# Patient Record
Sex: MALE | Race: WHITE | NOT HISPANIC OR LATINO | Employment: OTHER | URBAN - METROPOLITAN AREA
[De-identification: names, ages, dates, MRNs, and addresses within clinical notes are randomized per-mention and may not be internally consistent; named-entity substitution may affect disease eponyms.]

---

## 2017-01-12 ENCOUNTER — OFFICE VISIT (OUTPATIENT)
Dept: PODIATRY | Facility: CLINIC | Age: 67
End: 2017-01-12
Payer: MEDICARE

## 2017-01-12 VITALS — HEIGHT: 64 IN | WEIGHT: 126.56 LBS | BODY MASS INDEX: 21.61 KG/M2

## 2017-01-12 DIAGNOSIS — M21.6X9 ACQUIRED EQUINUS DEFORMITY OF FOOT, UNSPECIFIED LATERALITY: ICD-10-CM

## 2017-01-12 DIAGNOSIS — L84 CORN OR CALLUS: ICD-10-CM

## 2017-01-12 DIAGNOSIS — E11.42 DIABETIC POLYNEUROPATHY ASSOCIATED WITH TYPE 2 DIABETES MELLITUS: Primary | ICD-10-CM

## 2017-01-12 PROCEDURE — 2022F DILAT RTA XM EVC RTNOPTHY: CPT | Mod: S$GLB,,, | Performed by: PODIATRIST

## 2017-01-12 PROCEDURE — 1159F MED LIST DOCD IN RCRD: CPT | Mod: S$GLB,,, | Performed by: PODIATRIST

## 2017-01-12 PROCEDURE — 99203 OFFICE O/P NEW LOW 30 MIN: CPT | Mod: 25,S$GLB,, | Performed by: PODIATRIST

## 2017-01-12 PROCEDURE — 3060F POS MICROALBUMINURIA REV: CPT | Mod: S$GLB,,, | Performed by: PODIATRIST

## 2017-01-12 PROCEDURE — 99999 PR PBB SHADOW E&M-EST. PATIENT-LVL II: CPT | Mod: PBBFAC,,, | Performed by: PODIATRIST

## 2017-01-12 PROCEDURE — 3044F HG A1C LEVEL LT 7.0%: CPT | Mod: S$GLB,,, | Performed by: PODIATRIST

## 2017-01-12 RX ORDER — UREA 40 %
CREAM (GRAM) TOPICAL DAILY
Qty: 185 G | Refills: 11 | Status: SHIPPED | OUTPATIENT
Start: 2017-01-12 | End: 2017-03-02

## 2017-01-12 NOTE — LETTER
January 12, 2017      Deandre Altamirano MD  [address]  [address]  [facility] [address]           [place] [facility]  [address]  [facility] [address]  Phone: [phone]          Patient: Sukumar Tay   MR Number: [id number]   YOB: 1950   Date of Visit: 1/12/2017       Dear Dr. Deandre Altamirano:    Thank you for referring Sukumar Tay to me for evaluation. Attached you will find relevant portions of my assessment and plan of care.    If you have questions, please do not hesitate to call me. I look forward to following Sukumar Tay along with you.    Sincerely,    Niranjan Weaver, JON    Enclosure  CC:  No Recipients    If you would like to receive this communication electronically, please contact [email or web address] or [phone] to request more information on [employer] access.    For providers and/or their staff who would like to refer a patient to [facility], please contact us through our one-stop-shop provider referral line, [facility], at [phone].    If you feel you have received this communication in error or would no longer like to receive these types of communications, please e-mail [email or web address]

## 2017-01-12 NOTE — MR AVS SNAPSHOT
[place] [facility]  [address] [place] [address]  Phone: [phone]                  Sukumar Tay   2017 10:45 AM   Office Visit    Description:  Male : 1950   Provider:  Niranjan Weaver DPM   Department:  [place] [facility]           Reason for Visit     Diabetic Foot Exam     Plantar Fasciitis           Diagnoses this Visit        Comments    Diabetic polyneuropathy associated with type 2 diabetes mellitus    -  Primary     Corn or callus         Acquired equinus deformity of foot, unspecified laterality                To Do List           Future Appointments        Provider Department Dept Phone    3/2/2017 9:30 AM Mercedes Beatty MD [place] [facility] [phone]    2017 1:20 PM Deandre Altamirano MD [place] [facility] [phone]      Goals (5 Years of Data)     None      Follow-Up and Disposition     Return in about 1 year (around 2018) for AR exam.    Follow-up and Disposition History       These Medications        Disp Refills Start End    urea (CARMOL) 40 % Crea 185 g 11 2017     Apply topically once daily. - Topical    Pharmacy: [facility] [address] [place] [address] Ph #: [phone]         [facility] On Call     [facility] On Call Nurse Care Line -  Assistance  Registered nurses in the [facility] Call Center provide clinical advisement, health education, appointment booking, and other advisory services.  Call for this free service at [phone].             Medications           Message regarding Medications     Verify the changes and/or additions to your medication regime listed below are the same as discussed with your clinician today.  If any of these changes or additions are incorrect, please notify your healthcare provider.        START taking these NEW medications        Refills    urea (CARMOL) 40 % Crea 11    Sig: Apply topically once daily.    Class: Normal    Route: Topical           Verify that the  "below list of medications is an accurate representation of the medications you are currently taking.  If none reported, the list may be blank. If incorrect, please contact your healthcare provider. Carry this list with you in case of emergency.           Current Medications     blood sugar diagnostic Strp 1 each by Misc.(Non-Drug; Combo Route) route once daily.    blood-glucose meter kit 1 each by Other route once daily. TrueTest Metric Air    fluorouracil (EFUDEX) 5 % cream Thin film to left forehead/temple TWICE DAILY x 4 weeks    lancets Misc True Test Metric Air.  Use to test blood sugar daily.    metformin (GLUCOPHAGE) 500 MG tablet Take 1 tablet (500 mg total) by mouth daily with breakfast.    pravastatin (PRAVACHOL) 40 MG tablet Take 1 tablet (40 mg total) by mouth every evening.    urea (CARMOL) 40 % Crea Apply topically once daily.           Clinical Reference Information           Vital Signs - Last Recorded  Most recent update: 1/12/2017 10:46 AM by Alicia Hewitt LPN    Ht Wt BMI          5' 4" (1.626 m) 57.4 kg (126 lb 8.7 oz) 21.72 kg/m2        Allergies as of 1/12/2017     Bactrim [Sulfamethoxazole-trimethoprim]    Sulfa (Sulfonamide Antibiotics)      Immunizations Administered on Date of Encounter - 1/12/2017     None      Orders Placed During Today's Visit      Normal Orders This Visit    DIABETIC SHOES FOR HOME USE       "

## 2017-01-12 NOTE — PROGRESS NOTES
Subjective:      Patient ID: Sukumar Tay is a 66 y.o. male.    Chief Complaint: Diabetic Foot Exam (AR Care) and Plantar Fasciitis (left)    Sukumar Renee is a 66 y.o. male who presents to the clinic upon referral from Dr. Altamirano  for evaluation and treatment of diabetic feet. Sukumar Renee has a past medical history of Dysuria and H. pylori infection. Patient relates no major problem with feet. Only complaints today consist of callus both feet.  Gradual onset, worsening over past several weeks, aggravated by increased weight bearing, shoe gear, pressure.  No previous medical treatment.  OTC pain med not helping.      PCP: Deandre Altamirano MD    Date Last Seen by PCP:   Chief Complaint   Patient presents with    Diabetic Foot Exam     AR Care    Plantar Fasciitis     left        Current shoe gear: Casual shoes    Hemoglobin A1C   Date Value Ref Range Status   10/11/2016 5.6 4.5 - 6.2 % Final     Comment:     According to ADA guidelines, hemoglobin A1C <7.0% represents  optimal control in non-pregnant diabetic patients.  Different  metrics may apply to specific populations.   Standards of Medical Care in Diabetes - 2016.  For the purpose of screening for the presence of diabetes:  <5.7%     Consistent with the absence of diabetes  5.7-6.4%  Consistent with increasing risk for diabetes   (prediabetes)  >or=6.5%  Consistent with diabetes  Currently no consensus exists for use of hemoglobin A1C  for diagnosis of diabetes for children.             Review of Systems   Constitution: Negative for chills, diaphoresis, fever, malaise/fatigue and night sweats.   Cardiovascular: Negative for claudication, cyanosis, leg swelling and syncope.   Skin: Positive for suspicious lesions. Negative for color change, dry skin, nail changes, rash and unusual hair distribution.   Musculoskeletal: Negative for falls, joint pain, joint swelling, muscle cramps, muscle weakness and stiffness.   Gastrointestinal: Negative for constipation,  diarrhea, nausea and vomiting.   Neurological: Negative for brief paralysis, disturbances in coordination, focal weakness, numbness, paresthesias, sensory change and tremors.           Objective:      Physical Exam   Constitutional: He appears well-developed and well-nourished. He is cooperative. No distress.   Cardiovascular:   Pulses:       Popliteal pulses are 2+ on the right side, and 2+ on the left side.        Dorsalis pedis pulses are 2+ on the right side, and 2+ on the left side.        Posterior tibial pulses are 2+ on the right side, and 2+ on the left side.   Capillary refill 3 seconds all toes/distal feet, all toes/both feet warm to touch.      Negative lymphadenopathy bilateral popliteal fossa and tarsal tunnel.      Negavie lower extremity edema bilateral.     Musculoskeletal:        Right ankle: Normal. He exhibits normal range of motion, no swelling, no ecchymosis, no deformity, no laceration and normal pulse. Achilles tendon normal. Achilles tendon exhibits no pain, no defect and normal Mueller's test results.   Ankle dorsiflexion decreased at <10 degrees bilateral with moderate increase with knee flexion bilateral.    Otherwise, Normal angle, base, station of gait. All ten toes without clubbing, cyanosis, or signs of ischemia.  No pain to palpation bilateral lower extremities.  Range of motion, stability, muscle strength, and muscle tone normal bilateral feet and legs.     Lymphadenopathy: No inguinal adenopathy noted on the right or left side.   Negative lymphadenopathy bilateral popliteal fossa and tarsal tunnel.   Neurological: He is alert. He has normal strength. He displays no atrophy and no tremor. A sensory deficit is present. He exhibits normal muscle tone. He displays no seizure activity. Gait normal.   Reflex Scores:       Patellar reflexes are 2+ on the right side and 2+ on the left side.       Achilles reflexes are 2+ on the right side and 2+ on the left side.  Decreased/absent  vibratory sensation bilateral feet to 128Hz tuning fork.    Negative tinel sign to percussion sural, superficial peroneal, deep peroneal, saphenous, and posterior tibial nerves right and left ankles and feet.     Skin: Skin is warm, dry and intact. No abrasion, no bruising, no burn, no ecchymosis, no laceration, no lesion and no rash noted. He is not diaphoretic. No cyanosis or erythema. No pallor. Nails show no clubbing.     Focal hyperkeratotic lesion consisting entirely of hyperkeratotic tissue without open skin, drainage, pus, fluctuance, malodor, or signs of infection plantar 1st mtpj bilateral and between toes 4,5 bilateral.    Otherwise, Skin is normal age and health appropriate color, turgor, texture, and temperature bilateral lower extremities without ulceration, hyperpigmentation, discoloration, masses nodules or cords palpated.  No ecchymosis, erythema, edema, or cardinal signs of infection bilateral lower extremities.               Assessment:       Encounter Diagnoses   Name Primary?    Diabetic polyneuropathy associated with type 2 diabetes mellitus Yes    Corn or callus     Acquired equinus deformity of foot, unspecified laterality          Plan:       Sukumar Renee was seen today for diabetic foot exam and plantar fasciitis.    Diagnoses and all orders for this visit:    Diabetic polyneuropathy associated with type 2 diabetes mellitus  -     DIABETIC SHOES FOR HOME USE    Corn or callus  -     DIABETIC SHOES FOR HOME USE    Acquired equinus deformity of foot, unspecified laterality  -     DIABETIC SHOES FOR HOME USE    Other orders  -     urea (CARMOL) 40 % Crea; Apply topically once daily.      I counseled the patient on his conditions, their implications and medical management.        - Shoe inspection. Diabetic Foot Education. Patient reminded of the importance of good nutrition and blood sugar control to help prevent podiatric complications of diabetes. Patient instructed on proper foot hygeine.  We discussed wearing proper shoe gear, daily foot inspections, never walking without protective shoe gear, never putting sharp instruments to feet, routine podiatric nail visits every 2-3 months.      Rx diabetic shoes, custom molded inserts,urea.    recommendtoe spacers 4th interspace bilateral.    Discussed conservative treatment with shoes of adequate dimensions, material, and style to alleviate symptoms and delay or prevent surgical intervention.           Return in about 1 year (around 1/12/2018) for AR exam.

## 2017-03-02 ENCOUNTER — OFFICE VISIT (OUTPATIENT)
Dept: DERMATOLOGY | Facility: CLINIC | Age: 67
End: 2017-03-02
Payer: MEDICARE

## 2017-03-02 VITALS — HEIGHT: 64 IN | WEIGHT: 126 LBS | BODY MASS INDEX: 21.51 KG/M2

## 2017-03-02 DIAGNOSIS — L57.0 ACTINIC KERATOSES: Primary | ICD-10-CM

## 2017-03-02 DIAGNOSIS — L72.0 EIC (EPIDERMAL INCLUSION CYST): ICD-10-CM

## 2017-03-02 DIAGNOSIS — D18.01 CHERRY ANGIOMA: ICD-10-CM

## 2017-03-02 DIAGNOSIS — L82.1 SEBORRHEIC KERATOSES: ICD-10-CM

## 2017-03-02 PROCEDURE — 17000 DESTRUCT PREMALG LESION: CPT | Mod: S$GLB,,, | Performed by: DERMATOLOGY

## 2017-03-02 PROCEDURE — 99213 OFFICE O/P EST LOW 20 MIN: CPT | Mod: 25,S$GLB,, | Performed by: DERMATOLOGY

## 2017-03-02 PROCEDURE — 99999 PR PBB SHADOW E&M-EST. PATIENT-LVL II: CPT | Mod: PBBFAC,,, | Performed by: DERMATOLOGY

## 2017-03-02 PROCEDURE — 1160F RVW MEDS BY RX/DR IN RCRD: CPT | Mod: S$GLB,,, | Performed by: DERMATOLOGY

## 2017-03-02 PROCEDURE — 17003 DESTRUCT PREMALG LES 2-14: CPT | Mod: S$GLB,,, | Performed by: DERMATOLOGY

## 2017-03-02 NOTE — PATIENT INSTRUCTIONS

## 2017-03-02 NOTE — PROGRESS NOTES
Subjective:       Patient ID:  Sukumar Tay is a 66 y.o. male who presents for   Chief Complaint   Patient presents with    Skin Check     UBSE     HPI Comments: Patient last seen 11/2016 with numerous AK's on H&N, s/p cryo and efudex x one month (January) BID forehead  Here for Fu skin check    No h/o skin cancer        Review of Systems   Constitutional: Negative for fever and chills.   Skin: Positive for wears hat. Negative for daily sunscreen use, activity-related sunscreen use and recent sunburn.        Objective:    Physical Exam   Constitutional: He appears well-developed and well-nourished. No distress.   Neurological: He is alert and oriented to person, place, and time. He is not disoriented.   Psychiatric: He has a normal mood and affect.   Skin:   Areas Examined (abnormalities noted in diagram):   Scalp / Hair Palpated and Inspected  Head / Face Inspection Performed  Neck Inspection Performed  Chest / Axilla Inspection Performed  Abdomen Inspection Performed  Back Inspection Performed  RUE Inspected  LUE Inspection Performed  Nails and Digits Inspection Performed                   Diagram Legend     Erythematous scaling macule/papule c/w actinic keratosis       Vascular papule c/w angioma      Pigmented verrucoid papule/plaque c/w seborrheic keratosis      Yellow umbilicated papule c/w sebaceous hyperplasia      Irregularly shaped tan macule c/w lentigo     1-2 mm smooth white papules consistent with Milia      Movable subcutaneous cyst with punctum c/w epidermal inclusion cyst      Subcutaneous movable cyst c/w pilar cyst      Firm pink to brown papule c/w dermatofibroma      Pedunculated fleshy papule(s) c/w skin tag(s)      Evenly pigmented macule c/w junctional nevus     Mildly variegated pigmented, slightly irregular-bordered macule c/w mildly atypical nevus      Flesh colored to evenly pigmented papule c/w intradermal nevus       Pink pearly papule/plaque c/w basal cell carcinoma       Erythematous hyperkeratotic cursted plaque c/w SCC      Surgical scar with no sign of skin cancer recurrence      Open and closed comedones      Inflammatory papules and pustules      Verrucoid papule consistent consistent with wart     Erythematous eczematous patches and plaques     Dystrophic onycholytic nail with subungual debris c/w onychomycosis     Umbilicated papule    Erythematous-base heme-crusted tan verrucoid plaque consistent with inflamed seborrheic keratosis     Erythematous Silvery Scaling Plaque c/w Psoriasis     See annotation      Assessment / Plan:        Actinic keratoses  Great results post efudex, may need repeat course in the future  Cryosurgery Procedure Note    Verbal consent from the patient is obtained and the patient is aware of the precancerous quality and need for treatment of these lesions. Liquid nitrogen cryosurgery is applied to the 3 actinic keratoses, as detailed in the physical exam, to produce a freeze injury. The patient is aware that blisters may form and is instructed on wound care with gentle cleansing and use of vaseline ointment to keep moist until healed. The patient is supplied a handout on cryosurgery and is instructed to call if lesions do not completely resolve.    Seborrheic keratoses  These are benign inherited growths without a malignant potential. Reassurance given to patient. No treatment is necessary.     Cherry angioma  This is a benign vascular lesion. Reassurance given. No treatment required.     EIC (epidermal inclusion cyst)x 2    - stable and chronic  May elect to excise if bothersome, defers    Patient instructed in importance in daily sun protection of at least spf 30. Sun avoidance and topical protection discussed.   Recommend Elta MD (physician office) COTZ sensitive (available online) for daily use on face and neck.  Patient encouraged to wear hat for all outdoor exposure.   Also discussed sun protective clothing.             Return in about 1 year  (around 3/2/2018).

## 2017-07-24 ENCOUNTER — DOCUMENTATION ONLY (OUTPATIENT)
Dept: FAMILY MEDICINE | Facility: CLINIC | Age: 67
End: 2017-07-24

## 2017-07-24 NOTE — PROGRESS NOTES
Pre-Visit Chart Review  For Appointment Scheduled on 7-25-17    Health Maintenance Due   Topic Date Due    TETANUS VACCINE  07/04/1969    Zoster Vaccine  07/04/2011    Pneumococcal (65+) (1 of 2 - PCV13) 07/04/2016    Hemoglobin A1c  04/11/2017

## 2017-07-25 ENCOUNTER — OFFICE VISIT (OUTPATIENT)
Dept: FAMILY MEDICINE | Facility: CLINIC | Age: 67
End: 2017-07-25
Payer: MEDICARE

## 2017-07-25 ENCOUNTER — LAB VISIT (OUTPATIENT)
Dept: LAB | Facility: HOSPITAL | Age: 67
End: 2017-07-25
Attending: FAMILY MEDICINE
Payer: MEDICARE

## 2017-07-25 VITALS
BODY MASS INDEX: 20.67 KG/M2 | WEIGHT: 121.06 LBS | SYSTOLIC BLOOD PRESSURE: 101 MMHG | TEMPERATURE: 98 F | DIASTOLIC BLOOD PRESSURE: 60 MMHG | HEART RATE: 50 BPM | HEIGHT: 64 IN

## 2017-07-25 DIAGNOSIS — E78.5 HYPERLIPIDEMIA ASSOCIATED WITH TYPE 2 DIABETES MELLITUS: ICD-10-CM

## 2017-07-25 DIAGNOSIS — E11.69 HYPERLIPIDEMIA ASSOCIATED WITH TYPE 2 DIABETES MELLITUS: ICD-10-CM

## 2017-07-25 DIAGNOSIS — M10.40 OTHER SECONDARY GOUT, UNSPECIFIED CHRONICITY, UNSPECIFIED SITE: ICD-10-CM

## 2017-07-25 DIAGNOSIS — E11.9 TYPE 2 DIABETES MELLITUS WITHOUT COMPLICATION, WITHOUT LONG-TERM CURRENT USE OF INSULIN: Primary | ICD-10-CM

## 2017-07-25 DIAGNOSIS — Z12.5 PROSTATE CANCER SCREENING: ICD-10-CM

## 2017-07-25 DIAGNOSIS — E78.5 HYPERLIPIDEMIA, UNSPECIFIED HYPERLIPIDEMIA TYPE: ICD-10-CM

## 2017-07-25 DIAGNOSIS — E11.9 TYPE 2 DIABETES MELLITUS WITHOUT COMPLICATION, WITHOUT LONG-TERM CURRENT USE OF INSULIN: ICD-10-CM

## 2017-07-25 DIAGNOSIS — E11.8 TYPE 2 DIABETES MELLITUS WITH COMPLICATION, WITHOUT LONG-TERM CURRENT USE OF INSULIN: ICD-10-CM

## 2017-07-25 LAB
CREAT UR-MCNC: 13 MG/DL
MICROALBUMIN UR DL<=1MG/L-MCNC: <2.5 UG/ML
MICROALBUMIN/CREATININE RATIO: ABNORMAL UG/MG

## 2017-07-25 PROCEDURE — 99999 PR PBB SHADOW E&M-EST. PATIENT-LVL III: CPT | Mod: PBBFAC,,, | Performed by: FAMILY MEDICINE

## 2017-07-25 PROCEDURE — 3008F BODY MASS INDEX DOCD: CPT | Mod: S$GLB,,, | Performed by: FAMILY MEDICINE

## 2017-07-25 PROCEDURE — 1159F MED LIST DOCD IN RCRD: CPT | Mod: S$GLB,,, | Performed by: FAMILY MEDICINE

## 2017-07-25 PROCEDURE — 99214 OFFICE O/P EST MOD 30 MIN: CPT | Mod: S$GLB,,, | Performed by: FAMILY MEDICINE

## 2017-07-25 PROCEDURE — 3044F HG A1C LEVEL LT 7.0%: CPT | Mod: S$GLB,,, | Performed by: FAMILY MEDICINE

## 2017-07-25 PROCEDURE — 82570 ASSAY OF URINE CREATININE: CPT

## 2017-07-25 PROCEDURE — 1126F AMNT PAIN NOTED NONE PRSNT: CPT | Mod: S$GLB,,, | Performed by: FAMILY MEDICINE

## 2017-07-25 RX ORDER — METFORMIN HYDROCHLORIDE 500 MG/1
500 TABLET ORAL
Qty: 90 TABLET | Refills: 3 | Status: SHIPPED | OUTPATIENT
Start: 2017-07-25 | End: 2018-07-03 | Stop reason: SDUPTHER

## 2017-07-25 RX ORDER — PRAVASTATIN SODIUM 40 MG/1
40 TABLET ORAL NIGHTLY
Qty: 90 TABLET | Refills: 3 | Status: SHIPPED | OUTPATIENT
Start: 2017-07-25 | End: 2018-07-03 | Stop reason: SDUPTHER

## 2017-07-27 ENCOUNTER — LAB VISIT (OUTPATIENT)
Dept: LAB | Facility: HOSPITAL | Age: 67
End: 2017-07-27
Attending: FAMILY MEDICINE
Payer: MEDICARE

## 2017-07-27 ENCOUNTER — PATIENT MESSAGE (OUTPATIENT)
Dept: FAMILY MEDICINE | Facility: CLINIC | Age: 67
End: 2017-07-27

## 2017-07-27 DIAGNOSIS — E11.9 TYPE 2 DIABETES MELLITUS WITHOUT COMPLICATION, WITHOUT LONG-TERM CURRENT USE OF INSULIN: ICD-10-CM

## 2017-07-27 DIAGNOSIS — E11.69 HYPERLIPIDEMIA ASSOCIATED WITH TYPE 2 DIABETES MELLITUS: ICD-10-CM

## 2017-07-27 DIAGNOSIS — E78.5 HYPERLIPIDEMIA ASSOCIATED WITH TYPE 2 DIABETES MELLITUS: ICD-10-CM

## 2017-07-27 DIAGNOSIS — Z12.5 PROSTATE CANCER SCREENING: ICD-10-CM

## 2017-07-27 DIAGNOSIS — E87.5 HYPERKALEMIA: Primary | ICD-10-CM

## 2017-07-27 LAB
ALBUMIN SERPL BCP-MCNC: 4 G/DL
ALP SERPL-CCNC: 55 U/L
ALT SERPL W/O P-5'-P-CCNC: 20 U/L
ANION GAP SERPL CALC-SCNC: 10 MMOL/L
AST SERPL-CCNC: 29 U/L
BASOPHILS # BLD AUTO: 0.02 K/UL
BASOPHILS NFR BLD: 0.3 %
BILIRUB SERPL-MCNC: 0.8 MG/DL
BUN SERPL-MCNC: 30 MG/DL
CALCIUM SERPL-MCNC: 9.6 MG/DL
CHLORIDE SERPL-SCNC: 105 MMOL/L
CHOLEST/HDLC SERPL: 3.2 {RATIO}
CO2 SERPL-SCNC: 28 MMOL/L
COMPLEXED PSA SERPL-MCNC: 0.58 NG/ML
CREAT SERPL-MCNC: 1.1 MG/DL
DIFFERENTIAL METHOD: ABNORMAL
EOSINOPHIL # BLD AUTO: 0.2 K/UL
EOSINOPHIL NFR BLD: 3.5 %
ERYTHROCYTE [DISTWIDTH] IN BLOOD BY AUTOMATED COUNT: 12.7 %
EST. GFR  (AFRICAN AMERICAN): >60 ML/MIN/1.73 M^2
EST. GFR  (NON AFRICAN AMERICAN): >60 ML/MIN/1.73 M^2
ESTIMATED AVG GLUCOSE: 111 MG/DL
GLUCOSE SERPL-MCNC: 99 MG/DL
HBA1C MFR BLD HPLC: 5.5 %
HCT VFR BLD AUTO: 44.2 %
HDL/CHOLESTEROL RATIO: 31.7 %
HDLC SERPL-MCNC: 126 MG/DL
HDLC SERPL-MCNC: 40 MG/DL
HGB BLD-MCNC: 14.7 G/DL
LDLC SERPL CALC-MCNC: 77.4 MG/DL
LYMPHOCYTES # BLD AUTO: 2 K/UL
LYMPHOCYTES NFR BLD: 32.3 %
MCH RBC QN AUTO: 31.5 PG
MCHC RBC AUTO-ENTMCNC: 33.3 G/DL
MCV RBC AUTO: 95 FL
MONOCYTES # BLD AUTO: 0.4 K/UL
MONOCYTES NFR BLD: 6.6 %
NEUTROPHILS # BLD AUTO: 3.5 K/UL
NEUTROPHILS NFR BLD: 57.1 %
NONHDLC SERPL-MCNC: 86 MG/DL
PLATELET # BLD AUTO: 166 K/UL
PMV BLD AUTO: 9 FL
POTASSIUM SERPL-SCNC: 5.6 MMOL/L
PROT SERPL-MCNC: 7.5 G/DL
RBC # BLD AUTO: 4.66 M/UL
SODIUM SERPL-SCNC: 143 MMOL/L
TRIGL SERPL-MCNC: 43 MG/DL
WBC # BLD AUTO: 6.06 K/UL

## 2017-07-27 PROCEDURE — 85025 COMPLETE CBC W/AUTO DIFF WBC: CPT

## 2017-07-27 PROCEDURE — 84153 ASSAY OF PSA TOTAL: CPT

## 2017-07-27 PROCEDURE — 80053 COMPREHEN METABOLIC PANEL: CPT

## 2017-07-27 PROCEDURE — 36415 COLL VENOUS BLD VENIPUNCTURE: CPT | Mod: PO

## 2017-07-27 PROCEDURE — 80061 LIPID PANEL: CPT

## 2017-07-27 PROCEDURE — 83036 HEMOGLOBIN GLYCOSYLATED A1C: CPT

## 2017-08-04 ENCOUNTER — LAB VISIT (OUTPATIENT)
Dept: LAB | Facility: HOSPITAL | Age: 67
End: 2017-08-04
Attending: FAMILY MEDICINE
Payer: MEDICARE

## 2017-08-04 DIAGNOSIS — E87.5 HYPERKALEMIA: ICD-10-CM

## 2017-08-04 LAB
ANION GAP SERPL CALC-SCNC: 9 MMOL/L
BUN SERPL-MCNC: 22 MG/DL
CALCIUM SERPL-MCNC: 9.5 MG/DL
CHLORIDE SERPL-SCNC: 102 MMOL/L
CO2 SERPL-SCNC: 30 MMOL/L
CREAT SERPL-MCNC: 1 MG/DL
EST. GFR  (AFRICAN AMERICAN): >60 ML/MIN/1.73 M^2
EST. GFR  (NON AFRICAN AMERICAN): >60 ML/MIN/1.73 M^2
GLUCOSE SERPL-MCNC: 82 MG/DL
POTASSIUM SERPL-SCNC: 4.7 MMOL/L
SODIUM SERPL-SCNC: 141 MMOL/L

## 2017-08-04 PROCEDURE — 36415 COLL VENOUS BLD VENIPUNCTURE: CPT | Mod: PO

## 2017-08-04 PROCEDURE — 80048 BASIC METABOLIC PNL TOTAL CA: CPT

## 2017-08-07 NOTE — PROGRESS NOTES
Subjective:       Patient ID: Sukumar Tay is a 67 y.o. male.    Chief Complaint: Establish Care    Patient Active Problem List   Diagnosis    Diabetes mellitus    Hyperlipidemia    Gout       HPI  Review of Systems   Constitutional: Negative for fatigue and unexpected weight change.   Respiratory: Negative for chest tightness and shortness of breath.    Cardiovascular: Negative for chest pain, palpitations and leg swelling.   Gastrointestinal: Negative for abdominal pain.   Musculoskeletal: Negative for arthralgias.   Neurological: Negative for dizziness, syncope, light-headedness and headaches.       Objective:      Physical Exam   Constitutional: He is oriented to person, place, and time. He appears well-developed and well-nourished.   Cardiovascular: Normal rate, regular rhythm and normal heart sounds.    Pulmonary/Chest: Effort normal and breath sounds normal.   Musculoskeletal: He exhibits no edema.   Neurological: He is alert and oriented to person, place, and time.   Skin: Skin is warm and dry.   Psychiatric: He has a normal mood and affect.   Nursing note and vitals reviewed.      Assessment:       1. Type 2 diabetes mellitus without complication, without long-term current use of insulin    2. Hyperlipidemia associated with type 2 diabetes mellitus    3. Type 2 diabetes mellitus with complication, without long-term current use of insulin    4. Hyperlipidemia, unspecified hyperlipidemia type    5. Prostate cancer screening    6. Other secondary gout, unspecified chronicity, unspecified site        Plan:       1. Type 2 diabetes mellitus without complication, without long-term current use of insulin  Stable condition.  Continue current medications.  Will adjust based on lab findings or if condition changes.    - CBC auto differential; Future  - Comprehensive metabolic panel; Future  - Hemoglobin A1c; Future  - Microalbumin/creatinine urine ratio; Future  - metformin (GLUCOPHAGE) 500 MG tablet; Take 1  tablet (500 mg total) by mouth daily with breakfast.  Dispense: 90 tablet; Refill: 3    2. Hyperlipidemia associated with type 2 diabetes mellitus  Stable condition.  Continue current medications.  Will adjust based on lab findings or if condition changes.    - Lipid panel; Future  - pravastatin (PRAVACHOL) 40 MG tablet; Take 1 tablet (40 mg total) by mouth every evening.  Dispense: 90 tablet; Refill: 3    3. Type 2 diabetes mellitus with complication, without long-term current use of insulin  See above    4. Hyperlipidemia, unspecified hyperlipidemia type  See above    5. Prostate cancer screening  Screen and treat as indicated:    - PSA, Screening; Future    6. Other secondary gout, unspecified chronicity, unspecified site  Controlled on current medications.  Continue current medications.      [facility] goal documentation:  Patient readiness: acceptance and barriers:readiness  During the course of the visit the patient was educated and counseled about the following: Hypertension:   Dietary sodium restriction.  Regular aerobic exercise.  Goals: Hypertension: Reduce Blood Pressure  Goal/Outcomes met:Hypertension  The following self management tools provided:none  Patient Instructions (the written plan) was given to the patient/family: Yes  Time spent with patient: 20 minutes    Patient with be reevaluated in 1 year or sooner prn    Greater than 50% of this visit was spent counseling as described in above documentation:Yes

## 2018-06-04 ENCOUNTER — PATIENT MESSAGE (OUTPATIENT)
Dept: FAMILY MEDICINE | Facility: CLINIC | Age: 68
End: 2018-06-04

## 2018-06-04 DIAGNOSIS — E78.5 HYPERLIPIDEMIA, UNSPECIFIED HYPERLIPIDEMIA TYPE: Primary | ICD-10-CM

## 2018-06-04 DIAGNOSIS — M10.40 OTHER SECONDARY GOUT, UNSPECIFIED CHRONICITY, UNSPECIFIED SITE: ICD-10-CM

## 2018-06-04 DIAGNOSIS — E11.9 TYPE 2 DIABETES MELLITUS WITHOUT COMPLICATION, UNSPECIFIED WHETHER LONG TERM INSULIN USE: ICD-10-CM

## 2018-06-04 DIAGNOSIS — Z12.5 PROSTATE CANCER SCREENING: ICD-10-CM

## 2018-06-04 NOTE — TELEPHONE ENCOUNTER
Patient asking to do yearly labs before his 7-3-18 appointment. Would you like to order any lab work. Thank you.

## 2018-06-13 ENCOUNTER — LAB VISIT (OUTPATIENT)
Dept: LAB | Facility: HOSPITAL | Age: 68
End: 2018-06-13
Attending: FAMILY MEDICINE
Payer: MEDICARE

## 2018-06-13 DIAGNOSIS — E78.5 HYPERLIPIDEMIA, UNSPECIFIED HYPERLIPIDEMIA TYPE: ICD-10-CM

## 2018-06-13 DIAGNOSIS — M10.40 OTHER SECONDARY GOUT, UNSPECIFIED CHRONICITY, UNSPECIFIED SITE: ICD-10-CM

## 2018-06-13 DIAGNOSIS — E11.9 TYPE 2 DIABETES MELLITUS WITHOUT COMPLICATION, UNSPECIFIED WHETHER LONG TERM INSULIN USE: ICD-10-CM

## 2018-06-13 DIAGNOSIS — Z12.5 PROSTATE CANCER SCREENING: ICD-10-CM

## 2018-06-13 LAB
ALBUMIN SERPL BCP-MCNC: 4.2 G/DL
ALP SERPL-CCNC: 64 U/L
ALT SERPL W/O P-5'-P-CCNC: 29 U/L
ANION GAP SERPL CALC-SCNC: 10 MMOL/L
AST SERPL-CCNC: 35 U/L
BASOPHILS # BLD AUTO: 0.04 K/UL
BASOPHILS NFR BLD: 0.7 %
BILIRUB SERPL-MCNC: 0.7 MG/DL
BUN SERPL-MCNC: 21 MG/DL
CALCIUM SERPL-MCNC: 9.6 MG/DL
CHLORIDE SERPL-SCNC: 106 MMOL/L
CHOLEST SERPL-MCNC: 138 MG/DL
CHOLEST/HDLC SERPL: 3.3 {RATIO}
CO2 SERPL-SCNC: 27 MMOL/L
COMPLEXED PSA SERPL-MCNC: 0.48 NG/ML
CREAT SERPL-MCNC: 1 MG/DL
DIFFERENTIAL METHOD: ABNORMAL
EOSINOPHIL # BLD AUTO: 0.1 K/UL
EOSINOPHIL NFR BLD: 2.4 %
ERYTHROCYTE [DISTWIDTH] IN BLOOD BY AUTOMATED COUNT: 12.1 %
EST. GFR  (AFRICAN AMERICAN): >60 ML/MIN/1.73 M^2
EST. GFR  (NON AFRICAN AMERICAN): >60 ML/MIN/1.73 M^2
ESTIMATED AVG GLUCOSE: 108 MG/DL
GLUCOSE SERPL-MCNC: 118 MG/DL
HBA1C MFR BLD HPLC: 5.4 %
HCT VFR BLD AUTO: 44.9 %
HDLC SERPL-MCNC: 42 MG/DL
HDLC SERPL: 30.4 %
HGB BLD-MCNC: 14.6 G/DL
IMM GRANULOCYTES # BLD AUTO: 0.01 K/UL
IMM GRANULOCYTES NFR BLD AUTO: 0.2 %
LDLC SERPL CALC-MCNC: 76.8 MG/DL
LYMPHOCYTES # BLD AUTO: 1.9 K/UL
LYMPHOCYTES NFR BLD: 31.8 %
MCH RBC QN AUTO: 31.7 PG
MCHC RBC AUTO-ENTMCNC: 32.5 G/DL
MCV RBC AUTO: 98 FL
MONOCYTES # BLD AUTO: 0.5 K/UL
MONOCYTES NFR BLD: 9.1 %
NEUTROPHILS # BLD AUTO: 3.3 K/UL
NEUTROPHILS NFR BLD: 55.8 %
NONHDLC SERPL-MCNC: 96 MG/DL
NRBC BLD-RTO: 0 /100 WBC
PLATELET # BLD AUTO: 179 K/UL
PMV BLD AUTO: 9.1 FL
POTASSIUM SERPL-SCNC: 4.6 MMOL/L
PROT SERPL-MCNC: 7.6 G/DL
RBC # BLD AUTO: 4.6 M/UL
SODIUM SERPL-SCNC: 143 MMOL/L
TRIGL SERPL-MCNC: 96 MG/DL
URATE SERPL-MCNC: 5.8 MG/DL
WBC # BLD AUTO: 5.95 K/UL

## 2018-06-13 PROCEDURE — 80061 LIPID PANEL: CPT

## 2018-06-13 PROCEDURE — 80053 COMPREHEN METABOLIC PANEL: CPT

## 2018-06-13 PROCEDURE — 84550 ASSAY OF BLOOD/URIC ACID: CPT

## 2018-06-13 PROCEDURE — 36415 COLL VENOUS BLD VENIPUNCTURE: CPT | Mod: PO

## 2018-06-13 PROCEDURE — 84153 ASSAY OF PSA TOTAL: CPT

## 2018-06-13 PROCEDURE — 85025 COMPLETE CBC W/AUTO DIFF WBC: CPT

## 2018-06-13 PROCEDURE — 83036 HEMOGLOBIN GLYCOSYLATED A1C: CPT

## 2018-06-29 ENCOUNTER — PATIENT OUTREACH (OUTPATIENT)
Dept: ADMINISTRATIVE | Facility: HOSPITAL | Age: 68
End: 2018-06-29

## 2018-07-02 ENCOUNTER — DOCUMENTATION ONLY (OUTPATIENT)
Dept: FAMILY MEDICINE | Facility: CLINIC | Age: 68
End: 2018-07-02

## 2018-07-02 NOTE — PROGRESS NOTES
Pre-Visit Chart Review  For Appointment Scheduled on 7-3-18    Health Maintenance Due   Topic Date Due    TETANUS VACCINE  07/04/1969    Zoster Vaccine  07/04/2011    Foot Exam  01/12/2018

## 2018-07-03 ENCOUNTER — OFFICE VISIT (OUTPATIENT)
Dept: FAMILY MEDICINE | Facility: CLINIC | Age: 68
End: 2018-07-03
Payer: MEDICARE

## 2018-07-03 VITALS
SYSTOLIC BLOOD PRESSURE: 127 MMHG | WEIGHT: 122.81 LBS | BODY MASS INDEX: 20.97 KG/M2 | HEART RATE: 55 BPM | DIASTOLIC BLOOD PRESSURE: 61 MMHG | HEIGHT: 64 IN | TEMPERATURE: 98 F

## 2018-07-03 DIAGNOSIS — E78.5 HYPERLIPIDEMIA ASSOCIATED WITH TYPE 2 DIABETES MELLITUS: ICD-10-CM

## 2018-07-03 DIAGNOSIS — E11.9 TYPE 2 DIABETES MELLITUS WITHOUT COMPLICATION, WITHOUT LONG-TERM CURRENT USE OF INSULIN: ICD-10-CM

## 2018-07-03 DIAGNOSIS — M10.40 OTHER SECONDARY GOUT, UNSPECIFIED CHRONICITY, UNSPECIFIED SITE: Primary | ICD-10-CM

## 2018-07-03 DIAGNOSIS — H81.10 BENIGN PAROXYSMAL POSITIONAL VERTIGO, UNSPECIFIED LATERALITY: ICD-10-CM

## 2018-07-03 DIAGNOSIS — E11.69 HYPERLIPIDEMIA ASSOCIATED WITH TYPE 2 DIABETES MELLITUS: ICD-10-CM

## 2018-07-03 PROCEDURE — 99499 UNLISTED E&M SERVICE: CPT | Mod: S$GLB,,, | Performed by: FAMILY MEDICINE

## 2018-07-03 PROCEDURE — 3044F HG A1C LEVEL LT 7.0%: CPT | Mod: CPTII,S$GLB,, | Performed by: FAMILY MEDICINE

## 2018-07-03 PROCEDURE — 99999 PR PBB SHADOW E&M-EST. PATIENT-LVL III: CPT | Mod: PBBFAC,,, | Performed by: FAMILY MEDICINE

## 2018-07-03 PROCEDURE — 99214 OFFICE O/P EST MOD 30 MIN: CPT | Mod: S$GLB,,, | Performed by: FAMILY MEDICINE

## 2018-07-03 RX ORDER — LISINOPRIL 5 MG/1
5 TABLET ORAL DAILY
Qty: 90 TABLET | Refills: 3 | Status: SHIPPED | OUTPATIENT
Start: 2018-07-03 | End: 2019-07-07 | Stop reason: SDUPTHER

## 2018-07-03 RX ORDER — PRAVASTATIN SODIUM 40 MG/1
40 TABLET ORAL NIGHTLY
Qty: 90 TABLET | Refills: 3 | Status: SHIPPED | OUTPATIENT
Start: 2018-07-03 | End: 2019-07-07 | Stop reason: SDUPTHER

## 2018-07-03 RX ORDER — METFORMIN HYDROCHLORIDE 500 MG/1
500 TABLET ORAL
Qty: 90 TABLET | Refills: 3 | Status: SHIPPED | OUTPATIENT
Start: 2018-07-03 | End: 2019-07-07 | Stop reason: SDUPTHER

## 2018-07-03 RX ORDER — MECLIZINE HYDROCHLORIDE 25 MG/1
25 TABLET ORAL 3 TIMES DAILY PRN
Qty: 20 TABLET | Refills: 0 | Status: SHIPPED | OUTPATIENT
Start: 2018-07-03 | End: 2019-01-15

## 2018-07-03 NOTE — PROGRESS NOTES
Subjective:       Patient ID: Sukumar Tay is a 67 y.o. male.    Chief Complaint: Follow-up    HPI  Review of Systems   Constitutional: Negative for fatigue and unexpected weight change.   Respiratory: Negative for chest tightness and shortness of breath.    Cardiovascular: Negative for chest pain, palpitations and leg swelling.   Gastrointestinal: Negative for abdominal pain.   Musculoskeletal: Negative for arthralgias.   Neurological: Negative for dizziness, syncope, light-headedness and headaches.       Patient Active Problem List   Diagnosis    Diabetes mellitus    Hyperlipidemia    Gout    Hyperlipidemia associated with type 2 diabetes mellitus     Patient is here for a chronic conditions follow up.    Lab Visit on 06/13/2018   Component Date Value Ref Range Status    WBC 06/13/2018 5.95  3.90 - 12.70 K/uL Final    RBC 06/13/2018 4.60  4.60 - 6.20 M/uL Final    Hemoglobin 06/13/2018 14.6  14.0 - 18.0 g/dL Final    Hematocrit 06/13/2018 44.9  40.0 - 54.0 % Final    MCV 06/13/2018 98  82 - 98 fL Final    MCH 06/13/2018 31.7* 27.0 - 31.0 pg Final    MCHC 06/13/2018 32.5  32.0 - 36.0 g/dL Final    RDW 06/13/2018 12.1  11.5 - 14.5 % Final    Platelets 06/13/2018 179  150 - 350 K/uL Final    MPV 06/13/2018 9.1* 9.2 - 12.9 fL Final    Immature Granulocytes 06/13/2018 0.2  0.0 - 0.5 % Final    Gran # (ANC) 06/13/2018 3.3  1.8 - 7.7 K/uL Final    Immature Grans (Abs) 06/13/2018 0.01  0.00 - 0.04 K/uL Final    Lymph # 06/13/2018 1.9  1.0 - 4.8 K/uL Final    Mono # 06/13/2018 0.5  0.3 - 1.0 K/uL Final    Eos # 06/13/2018 0.1  0.0 - 0.5 K/uL Final    Baso # 06/13/2018 0.04  0.00 - 0.20 K/uL Final    nRBC 06/13/2018 0  0 /100 WBC Final    Gran% 06/13/2018 55.8  38.0 - 73.0 % Final    Lymph% 06/13/2018 31.8  18.0 - 48.0 % Final    Mono% 06/13/2018 9.1  4.0 - 15.0 % Final    Eosinophil% 06/13/2018 2.4  0.0 - 8.0 % Final    Basophil% 06/13/2018 0.7  0.0 - 1.9 % Final    Differential Method  06/13/2018 Automated   Final    Sodium 06/13/2018 143  136 - 145 mmol/L Final    Potassium 06/13/2018 4.6  3.5 - 5.1 mmol/L Final    Chloride 06/13/2018 106  95 - 110 mmol/L Final    CO2 06/13/2018 27  23 - 29 mmol/L Final    Glucose 06/13/2018 118* 70 - 110 mg/dL Final    BUN, Bld 06/13/2018 21  8 - 23 mg/dL Final    Creatinine 06/13/2018 1.0  0.5 - 1.4 mg/dL Final    Calcium 06/13/2018 9.6  8.7 - 10.5 mg/dL Final    Total Protein 06/13/2018 7.6  6.0 - 8.4 g/dL Final    Albumin 06/13/2018 4.2  3.5 - 5.2 g/dL Final    Total Bilirubin 06/13/2018 0.7  0.1 - 1.0 mg/dL Final    Alkaline Phosphatase 06/13/2018 64  55 - 135 U/L Final    AST 06/13/2018 35  10 - 40 U/L Final    ALT 06/13/2018 29  10 - 44 U/L Final    Anion Gap 06/13/2018 10  8 - 16 mmol/L Final    eGFR if African American 06/13/2018 >60.0  >60 mL/min/1.73 m^2 Final    eGFR if non African American 06/13/2018 >60.0  >60 mL/min/1.73 m^2 Final    Hemoglobin A1C 06/13/2018 5.4  4.0 - 5.6 % Final    Estimated Avg Glucose 06/13/2018 108  68 - 131 mg/dL Final    Cholesterol 06/13/2018 138  120 - 199 mg/dL Final    Triglycerides 06/13/2018 96  30 - 150 mg/dL Final    HDL 06/13/2018 42  40 - 75 mg/dL Final    LDL Cholesterol 06/13/2018 76.8  63.0 - 159.0 mg/dL Final    HDL/Chol Ratio 06/13/2018 30.4  20.0 - 50.0 % Final    Total Cholesterol/HDL Ratio 06/13/2018 3.3  2.0 - 5.0 Final    Non-HDL Cholesterol 06/13/2018 96  mg/dL Final    PSA, SCREEN 06/13/2018 0.48  0.00 - 4.00 ng/mL Final    Uric Acid 06/13/2018 5.8  3.4 - 7.0 mg/dL Final   Lab Visit on 06/13/2018   Component Date Value Ref Range Status    Microalbum.,U,Random 06/13/2018 3.0  ug/mL Final    Creatinine, Random Ur 06/13/2018 68.0  23.0 - 375.0 mg/dL Final    Microalb Creat Ratio 06/13/2018 4.4  0.0 - 30.0 ug/mg Final     Objective:      Physical Exam   Constitutional: He is oriented to person, place, and time. He appears well-developed and well-nourished.   Cardiovascular:  Normal rate, regular rhythm and normal heart sounds.    Pulses:       Dorsalis pedis pulses are 2+ on the right side, and 2+ on the left side.        Posterior tibial pulses are 2+ on the right side, and 2+ on the left side.   Pulmonary/Chest: Effort normal and breath sounds normal.   Musculoskeletal: He exhibits no edema.        Right foot: There is normal range of motion and no deformity.        Left foot: There is normal range of motion and no deformity.   Feet:   Right Foot:   Protective Sensation: 10 sites tested. 10 sites sensed.   Skin Integrity: Negative for ulcer, blister or skin breakdown.   Left Foot:   Protective Sensation: 10 sites tested. 10 sites sensed.   Skin Integrity: Negative for ulcer, blister or skin breakdown.   Neurological: He is alert and oriented to person, place, and time.   Skin: Skin is warm and dry.        Psychiatric: He has a normal mood and affect.   Nursing note and vitals reviewed.      Assessment:       1. Other secondary gout, unspecified chronicity, unspecified site    2. Hyperlipidemia associated with type 2 diabetes mellitus    3. Type 2 diabetes mellitus without complication, without long-term current use of insulin    4. Benign paroxysmal positional vertigo, unspecified laterality        Plan:       1. Hyperlipidemia associated with type 2 diabetes mellitus  Stable condition.  Continue current medications.  Will adjust based on lab findings or if condition changes.    - pravastatin (PRAVACHOL) 40 MG tablet; Take 1 tablet (40 mg total) by mouth every evening.  Dispense: 90 tablet; Refill: 3  - Lipid panel; Future    2. Type 2 diabetes mellitus without complication, without long-term current use of insulin  Controlled on current medications.  Continue current medications.    - metFORMIN (GLUCOPHAGE) 500 MG tablet; Take 1 tablet (500 mg total) by mouth daily with breakfast.  Dispense: 90 tablet; Refill: 3  Add for renal protection  - lisinopril (PRINIVIL,ZESTRIL) 5 MG tablet;  Take 1 tablet (5 mg total) by mouth once daily.  Dispense: 90 tablet; Refill: 3  - CBC auto differential; Future  - Comprehensive metabolic panel; Future  - Hemoglobin A1c; Future  Patient was counseled on proper foot care, always wearing protective shoes and not going barefoot, daily foot checks and proper toenail and skin hygiene.  Daily checks for ulcers, skin breakdown, blisters, etc were encouraged.  Patient was advised to moisturize liberally.  3. Other secondary gout, unspecified chronicity, unspecified site  Controlled on current medications.  Continue current medications.    - Uric acid; Future    4. Benign paroxysmal positional vertigo, unspecified laterality  Use prn  - meclizine (ANTIVERT) 25 mg tablet; Take 1 tablet (25 mg total) by mouth 3 (three) times daily as needed for Dizziness.  Dispense: 20 tablet; Refill: 0    Reeval 6 months or sooner prn

## 2018-07-24 ENCOUNTER — PES CALL (OUTPATIENT)
Dept: ADMINISTRATIVE | Facility: CLINIC | Age: 68
End: 2018-07-24

## 2018-08-27 ENCOUNTER — PES CALL (OUTPATIENT)
Dept: ADMINISTRATIVE | Facility: CLINIC | Age: 68
End: 2018-08-27

## 2018-09-21 ENCOUNTER — OFFICE VISIT (OUTPATIENT)
Dept: DERMATOLOGY | Facility: CLINIC | Age: 68
End: 2018-09-21
Payer: MEDICARE

## 2018-09-21 DIAGNOSIS — L72.0 EIC (EPIDERMAL INCLUSION CYST): Primary | ICD-10-CM

## 2018-09-21 PROCEDURE — 11422 EXC H-F-NK-SP B9+MARG 1.1-2: CPT | Mod: 51,S$PBB,, | Performed by: DERMATOLOGY

## 2018-09-21 PROCEDURE — 12041 INTMD RPR N-HF/GENIT 2.5CM/<: CPT | Mod: S$PBB,,, | Performed by: DERMATOLOGY

## 2018-09-21 PROCEDURE — 99999 PR PBB SHADOW E&M-EST. PATIENT-LVL II: CPT | Mod: PBBFAC,,, | Performed by: DERMATOLOGY

## 2018-09-21 PROCEDURE — 88304 TISSUE EXAM BY PATHOLOGIST: CPT | Performed by: PATHOLOGY

## 2018-09-21 PROCEDURE — 12041 INTMD RPR N-HF/GENIT 2.5CM/<: CPT | Mod: PBBFAC,PO | Performed by: DERMATOLOGY

## 2018-09-21 PROCEDURE — 99212 OFFICE O/P EST SF 10 MIN: CPT | Mod: PBBFAC,PO | Performed by: DERMATOLOGY

## 2018-09-21 PROCEDURE — 99499 UNLISTED E&M SERVICE: CPT | Mod: S$PBB,,, | Performed by: DERMATOLOGY

## 2018-09-21 PROCEDURE — 11422 EXC H-F-NK-SP B9+MARG 1.1-2: CPT | Mod: PBBFAC,PO | Performed by: DERMATOLOGY

## 2018-09-21 NOTE — PATIENT INSTRUCTIONS
Surgery Wound Care    Your doctor has performed an excision today.  A bandage and vaseline ointment has been placed over the site.  This should remain in place for 24 hours.  It is recommended that you keep the area dry for the first 24 hours.  After 24 hours, you may remove the band aid and wash the area with warm soap and water and apply Vaseline jelly or aquaphore.  Many patients prefer to use Neosporin or Bacitracin ointment.  This is acceptable; however know that you can develop an allergy to this medication even if you have used it safely for years.  It is important to keep the area moist.  Letting it dry out and get air slows healing time, will worsen the scar, and make it more difficult to remove the stitches if they were placed.        If you notice increasing redness, tenderness, pain, or yellow drainage at the biopsy or surgical site, please notify your doctor.  These are signs of an infection.    If your biopsy/surgical site is bleeding, apply firm pressure for 15 minutes straight.  Repeat for another 15 minutes, if it is still bleeding.   If the surgical site continues to bleed, then please contact your doctor.     [facility]  [facility]  [address] [place] [address]  Dept: [phone]

## 2018-09-21 NOTE — PROGRESS NOTES
Subjective:       Patient ID:  Sukumar Tay is a 68 y.o. male who presents for   Chief Complaint   Patient presents with    Cyst     L neck     Patient here for excision of symptomatic EIC left posterior neck; growing, tender with pressure      Cyst  - Initial  Affected locations: neck  Duration: years.  Signs and Symptoms: raised.  Severity: mild  Timing: constant  Aggravated by: nothing  Relieving factors/Treatments tried: nothing        Review of Systems   Constitutional: Negative for fever, chills and fatigue.   Skin: Positive for wears hat. Negative for daily sunscreen use and activity-related sunscreen use.   Hematologic/Lymphatic: Does not bruise/bleed easily.        Objective:    Physical Exam   Constitutional: He appears well-developed and well-nourished. No distress.   Neurological: He is alert and oriented to person, place, and time. He is not disoriented.   Psychiatric: He has a normal mood and affect.   Skin:   Areas Examined (abnormalities noted in diagram):   Neck Inspection Performed              Diagram Legend     Erythematous scaling macule/papule c/w actinic keratosis       Vascular papule c/w angioma      Pigmented verrucoid papule/plaque c/w seborrheic keratosis      Yellow umbilicated papule c/w sebaceous hyperplasia      Irregularly shaped tan macule c/w lentigo     1-2 mm smooth white papules consistent with Milia      Movable subcutaneous cyst with punctum c/w epidermal inclusion cyst      Subcutaneous movable cyst c/w pilar cyst      Firm pink to brown papule c/w dermatofibroma      Pedunculated fleshy papule(s) c/w skin tag(s)      Evenly pigmented macule c/w junctional nevus     Mildly variegated pigmented, slightly irregular-bordered macule c/w mildly atypical nevus      Flesh colored to evenly pigmented papule c/w intradermal nevus       Pink pearly papule/plaque c/w basal cell carcinoma      Erythematous hyperkeratotic cursted plaque c/w SCC      Surgical scar with no sign of  skin cancer recurrence      Open and closed comedones      Inflammatory papules and pustules      Verrucoid papule consistent consistent with wart     Erythematous eczematous patches and plaques     Dystrophic onycholytic nail with subungual debris c/w onychomycosis     Umbilicated papule    Erythematous-base heme-crusted tan verrucoid plaque consistent with inflamed seborrheic keratosis     Erythematous Silvery Scaling Plaque c/w Psoriasis     See annotation      Assessment / Plan:      Pathology Orders:     Normal Orders This Visit    Tissue Specimen To Pathology, Dermatology     Questions:    Directional Terms:  Other(comment)    Clinical information:  subcutaneous nodule with overlyin punctum EIC vs other    Specific Site:  left posterior neck        EIC (epidermal inclusion cyst)  -     Tissue Specimen To Pathology, Dermatology    PREPARATION: The diagnosis, procedure, alternatives, benefits and risks, including but not limited to: infection, bleeding/bruising, drug reactions, pain, scar or cosmetic defect, local sensation disturbances, wound dehiscence (separation of wound edges after sutures removed) and/or recurrence of present condition were explained to the patient. The patient elected to proceed.  Patient's identity was verified using 2 patient identifiers and the side and site was verified.  Time out period with surgeon, assistant and patient in surgical suite was taken.    PROCEDURE: The location noted above was prepped and draped in the usual sterile fashion. The area was anesthetized with intradermal buffered xylocaine. A fusiform elliptical excision was done with #15 blade carried down to cyst wall, and dissection was carried out around the cyst wall.  Electrocoagulation was used to obtain hemostasis. Blood loss was minimal. The wound was then approximated in a layered fashion with subcutaneous and intradermal sutures of 4.0 Monocryl, approximately 2 in number, and the wound was then superficially  closed with simple interrupted sutures of 4.0 Prolene.   Lesion size: 1.5cm  Final incision length: 2cm         No Follow-up on file.

## 2018-10-02 ENCOUNTER — CLINICAL SUPPORT (OUTPATIENT)
Dept: DERMATOLOGY | Facility: CLINIC | Age: 68
End: 2018-10-02
Payer: MEDICARE

## 2018-10-02 DIAGNOSIS — Z48.02 VISIT FOR SUTURE REMOVAL: Primary | ICD-10-CM

## 2018-10-02 PROCEDURE — 99024 POSTOP FOLLOW-UP VISIT: CPT | Mod: ,,, | Performed by: DERMATOLOGY

## 2018-12-17 ENCOUNTER — TELEPHONE (OUTPATIENT)
Dept: ADMINISTRATIVE | Facility: HOSPITAL | Age: 68
End: 2018-12-17

## 2019-01-04 ENCOUNTER — LAB VISIT (OUTPATIENT)
Dept: LAB | Facility: HOSPITAL | Age: 69
End: 2019-01-04
Attending: FAMILY MEDICINE
Payer: MEDICARE

## 2019-01-04 DIAGNOSIS — E78.5 HYPERLIPIDEMIA ASSOCIATED WITH TYPE 2 DIABETES MELLITUS: ICD-10-CM

## 2019-01-04 DIAGNOSIS — M10.40 OTHER SECONDARY GOUT, UNSPECIFIED CHRONICITY, UNSPECIFIED SITE: ICD-10-CM

## 2019-01-04 DIAGNOSIS — E11.9 TYPE 2 DIABETES MELLITUS WITHOUT COMPLICATION, WITHOUT LONG-TERM CURRENT USE OF INSULIN: ICD-10-CM

## 2019-01-04 DIAGNOSIS — E11.69 HYPERLIPIDEMIA ASSOCIATED WITH TYPE 2 DIABETES MELLITUS: ICD-10-CM

## 2019-01-04 LAB
ALBUMIN SERPL BCP-MCNC: 4.3 G/DL
ALP SERPL-CCNC: 57 U/L
ALT SERPL W/O P-5'-P-CCNC: 22 U/L
ANION GAP SERPL CALC-SCNC: 7 MMOL/L
AST SERPL-CCNC: 33 U/L
BASOPHILS # BLD AUTO: 0.05 K/UL
BASOPHILS NFR BLD: 0.8 %
BILIRUB SERPL-MCNC: 0.8 MG/DL
BUN SERPL-MCNC: 26 MG/DL
CALCIUM SERPL-MCNC: 9.7 MG/DL
CHLORIDE SERPL-SCNC: 104 MMOL/L
CHOLEST SERPL-MCNC: 144 MG/DL
CHOLEST/HDLC SERPL: 3.1 {RATIO}
CO2 SERPL-SCNC: 29 MMOL/L
CREAT SERPL-MCNC: 1.1 MG/DL
DIFFERENTIAL METHOD: ABNORMAL
EOSINOPHIL # BLD AUTO: 0.2 K/UL
EOSINOPHIL NFR BLD: 3 %
ERYTHROCYTE [DISTWIDTH] IN BLOOD BY AUTOMATED COUNT: 12.4 %
EST. GFR  (AFRICAN AMERICAN): >60 ML/MIN/1.73 M^2
EST. GFR  (NON AFRICAN AMERICAN): >60 ML/MIN/1.73 M^2
ESTIMATED AVG GLUCOSE: 108 MG/DL
GLUCOSE SERPL-MCNC: 110 MG/DL
HBA1C MFR BLD HPLC: 5.4 %
HCT VFR BLD AUTO: 44 %
HDLC SERPL-MCNC: 46 MG/DL
HDLC SERPL: 31.9 %
HGB BLD-MCNC: 14.2 G/DL
IMM GRANULOCYTES # BLD AUTO: 0.01 K/UL
IMM GRANULOCYTES NFR BLD AUTO: 0.2 %
LDLC SERPL CALC-MCNC: 87.2 MG/DL
LYMPHOCYTES # BLD AUTO: 2 K/UL
LYMPHOCYTES NFR BLD: 32.5 %
MCH RBC QN AUTO: 31.4 PG
MCHC RBC AUTO-ENTMCNC: 32.3 G/DL
MCV RBC AUTO: 97 FL
MONOCYTES # BLD AUTO: 0.5 K/UL
MONOCYTES NFR BLD: 8.7 %
NEUTROPHILS # BLD AUTO: 3.3 K/UL
NEUTROPHILS NFR BLD: 54.8 %
NONHDLC SERPL-MCNC: 98 MG/DL
NRBC BLD-RTO: 0 /100 WBC
PLATELET # BLD AUTO: 226 K/UL
PMV BLD AUTO: 9 FL
POTASSIUM SERPL-SCNC: 4.5 MMOL/L
PROT SERPL-MCNC: 7.9 G/DL
RBC # BLD AUTO: 4.52 M/UL
SODIUM SERPL-SCNC: 140 MMOL/L
TRIGL SERPL-MCNC: 54 MG/DL
URATE SERPL-MCNC: 7.8 MG/DL
WBC # BLD AUTO: 6 K/UL

## 2019-01-04 PROCEDURE — 83036 HEMOGLOBIN GLYCOSYLATED A1C: CPT

## 2019-01-04 PROCEDURE — 84550 ASSAY OF BLOOD/URIC ACID: CPT

## 2019-01-04 PROCEDURE — 80061 LIPID PANEL: CPT

## 2019-01-04 PROCEDURE — 36415 COLL VENOUS BLD VENIPUNCTURE: CPT | Mod: PO

## 2019-01-04 PROCEDURE — 85025 COMPLETE CBC W/AUTO DIFF WBC: CPT

## 2019-01-04 PROCEDURE — 80053 COMPREHEN METABOLIC PANEL: CPT

## 2019-01-15 ENCOUNTER — OFFICE VISIT (OUTPATIENT)
Dept: DERMATOLOGY | Facility: CLINIC | Age: 69
End: 2019-01-15
Payer: MEDICARE

## 2019-01-15 ENCOUNTER — OFFICE VISIT (OUTPATIENT)
Dept: FAMILY MEDICINE | Facility: CLINIC | Age: 69
End: 2019-01-15
Payer: MEDICARE

## 2019-01-15 ENCOUNTER — TELEPHONE (OUTPATIENT)
Dept: ADMINISTRATIVE | Facility: HOSPITAL | Age: 69
End: 2019-01-15

## 2019-01-15 VITALS
OXYGEN SATURATION: 98 % | WEIGHT: 124.56 LBS | HEIGHT: 64 IN | TEMPERATURE: 98 F | BODY MASS INDEX: 21.27 KG/M2 | DIASTOLIC BLOOD PRESSURE: 60 MMHG | SYSTOLIC BLOOD PRESSURE: 105 MMHG | HEART RATE: 53 BPM | RESPIRATION RATE: 14 BRPM

## 2019-01-15 VITALS — BODY MASS INDEX: 20.83 KG/M2 | WEIGHT: 122 LBS | HEIGHT: 64 IN

## 2019-01-15 DIAGNOSIS — L81.4 SOLAR LENTIGO: ICD-10-CM

## 2019-01-15 DIAGNOSIS — E11.69 HYPERLIPIDEMIA ASSOCIATED WITH TYPE 2 DIABETES MELLITUS: Primary | ICD-10-CM

## 2019-01-15 DIAGNOSIS — D48.9 NEOPLASM OF UNCERTAIN BEHAVIOR: Primary | ICD-10-CM

## 2019-01-15 DIAGNOSIS — H01.139 ECZEMATOUS DERMATITIS OF EYELID, UNSPECIFIED LATERALITY: ICD-10-CM

## 2019-01-15 DIAGNOSIS — E78.5 HYPERLIPIDEMIA ASSOCIATED WITH TYPE 2 DIABETES MELLITUS: Primary | ICD-10-CM

## 2019-01-15 DIAGNOSIS — L57.0 ACTINIC KERATOSES: ICD-10-CM

## 2019-01-15 DIAGNOSIS — Z01.810 PREOP CARDIOVASCULAR EXAM: ICD-10-CM

## 2019-01-15 DIAGNOSIS — M10.40 OTHER SECONDARY GOUT, UNSPECIFIED CHRONICITY, UNSPECIFIED SITE: ICD-10-CM

## 2019-01-15 DIAGNOSIS — E11.9 TYPE 2 DIABETES MELLITUS WITHOUT COMPLICATION, WITHOUT LONG-TERM CURRENT USE OF INSULIN: ICD-10-CM

## 2019-01-15 DIAGNOSIS — L82.1 SEBORRHEIC KERATOSES: ICD-10-CM

## 2019-01-15 PROCEDURE — 99999 PR PBB SHADOW E&M-EST. PATIENT-LVL IV: CPT | Mod: PBBFAC,,, | Performed by: FAMILY MEDICINE

## 2019-01-15 PROCEDURE — 99214 OFFICE O/P EST MOD 30 MIN: CPT | Mod: S$GLB,,, | Performed by: FAMILY MEDICINE

## 2019-01-15 PROCEDURE — 88305 TISSUE EXAM BY PATHOLOGIST: CPT | Mod: 59 | Performed by: PATHOLOGY

## 2019-01-15 PROCEDURE — 3044F HG A1C LEVEL LT 7.0%: CPT | Mod: CPTII,S$GLB,, | Performed by: FAMILY MEDICINE

## 2019-01-15 PROCEDURE — 99999 PR PBB SHADOW E&M-EST. PATIENT-LVL III: ICD-10-PCS | Mod: PBBFAC,,, | Performed by: DERMATOLOGY

## 2019-01-15 PROCEDURE — 1101F PT FALLS ASSESS-DOCD LE1/YR: CPT | Mod: CPTII,S$GLB,, | Performed by: DERMATOLOGY

## 2019-01-15 PROCEDURE — 17003 DESTRUCT PREMALG LES 2-14: CPT | Mod: S$GLB,,, | Performed by: DERMATOLOGY

## 2019-01-15 PROCEDURE — 1101F PR PT FALLS ASSESS DOC 0-1 FALLS W/OUT INJ PAST YR: ICD-10-PCS | Mod: CPTII,S$GLB,, | Performed by: DERMATOLOGY

## 2019-01-15 PROCEDURE — 99499 RISK ADDL DX/OHS AUDIT: ICD-10-PCS | Mod: HCNC,S$GLB,, | Performed by: DERMATOLOGY

## 2019-01-15 PROCEDURE — 11103 TANGNTL BX SKIN EA SEP/ADDL: CPT | Mod: S$GLB,,, | Performed by: DERMATOLOGY

## 2019-01-15 PROCEDURE — 99999 PR PBB SHADOW E&M-EST. PATIENT-LVL IV: ICD-10-PCS | Mod: PBBFAC,,, | Performed by: FAMILY MEDICINE

## 2019-01-15 PROCEDURE — 99214 PR OFFICE/OUTPT VISIT, EST, LEVL IV, 30-39 MIN: ICD-10-PCS | Mod: S$GLB,,, | Performed by: FAMILY MEDICINE

## 2019-01-15 PROCEDURE — 99499 UNLISTED E&M SERVICE: CPT | Mod: HCNC,S$GLB,, | Performed by: DERMATOLOGY

## 2019-01-15 PROCEDURE — 1101F PR PT FALLS ASSESS DOC 0-1 FALLS W/OUT INJ PAST YR: ICD-10-PCS | Mod: CPTII,S$GLB,, | Performed by: FAMILY MEDICINE

## 2019-01-15 PROCEDURE — 3044F PR MOST RECENT HEMOGLOBIN A1C LEVEL <7.0%: ICD-10-PCS | Mod: CPTII,S$GLB,, | Performed by: FAMILY MEDICINE

## 2019-01-15 PROCEDURE — 17003 DESTRUCTION, PREMALIGNANT LESIONS; SECOND THROUGH 14 LESIONS: ICD-10-PCS | Mod: S$GLB,,, | Performed by: DERMATOLOGY

## 2019-01-15 PROCEDURE — 11103 PR TANGENTIAL BIOPSY, SKIN, EA ADDTL LESION: ICD-10-PCS | Mod: S$GLB,,, | Performed by: DERMATOLOGY

## 2019-01-15 PROCEDURE — 88305 TISSUE SPECIMEN TO PATHOLOGY, DERMATOLOGY: ICD-10-PCS | Mod: 26,,, | Performed by: PATHOLOGY

## 2019-01-15 PROCEDURE — 99999 PR PBB SHADOW E&M-EST. PATIENT-LVL III: CPT | Mod: PBBFAC,,, | Performed by: DERMATOLOGY

## 2019-01-15 PROCEDURE — 11102 TANGNTL BX SKIN SINGLE LES: CPT | Mod: 59,S$GLB,, | Performed by: DERMATOLOGY

## 2019-01-15 PROCEDURE — 17000 DESTRUCT PREMALG LESION: CPT | Mod: 59,S$GLB,, | Performed by: DERMATOLOGY

## 2019-01-15 PROCEDURE — 99499 UNLISTED E&M SERVICE: CPT | Mod: HCNC,S$GLB,, | Performed by: FAMILY MEDICINE

## 2019-01-15 PROCEDURE — 17000 PR DESTRUCTION(LASER SURGERY,CRYOSURGERY,CHEMOSURGERY),PREMALIGNANT LESIONS,FIRST LESION: ICD-10-PCS | Mod: 59,S$GLB,, | Performed by: DERMATOLOGY

## 2019-01-15 PROCEDURE — 99213 PR OFFICE/OUTPT VISIT, EST, LEVL III, 20-29 MIN: ICD-10-PCS | Mod: 25,S$GLB,, | Performed by: DERMATOLOGY

## 2019-01-15 PROCEDURE — 99499 RISK ADDL DX/OHS AUDIT: ICD-10-PCS | Mod: HCNC,S$GLB,, | Performed by: FAMILY MEDICINE

## 2019-01-15 PROCEDURE — 99213 OFFICE O/P EST LOW 20 MIN: CPT | Mod: 25,S$GLB,, | Performed by: DERMATOLOGY

## 2019-01-15 PROCEDURE — 11102 PR TANGENTIAL BIOPSY, SKIN, SINGLE LESION: ICD-10-PCS | Mod: 59,S$GLB,, | Performed by: DERMATOLOGY

## 2019-01-15 PROCEDURE — 1101F PT FALLS ASSESS-DOCD LE1/YR: CPT | Mod: CPTII,S$GLB,, | Performed by: FAMILY MEDICINE

## 2019-01-15 RX ORDER — PHENYLEPHRINE HCL 10 MG/1
TABLET, FILM COATED ORAL
COMMUNITY
Start: 2019-01-03 | End: 2021-09-07

## 2019-01-15 RX ORDER — TACROLIMUS 1 MG/G
OINTMENT TOPICAL
Qty: 30 G | Refills: 2 | Status: SHIPPED | OUTPATIENT
Start: 2019-01-15 | End: 2019-08-30 | Stop reason: ALTCHOICE

## 2019-01-15 NOTE — PATIENT INSTRUCTIONS

## 2019-01-15 NOTE — PROGRESS NOTES
Subjective:       Patient ID:  Sukumar Tay is a 68 y.o. male who presents for   Chief Complaint   Patient presents with    Spot     face, x few months, scaly, no tx     Patient last seen 03/2017   H/o  AK's on H&N, s/p cryo and efudex x one month (January 2017) BID forehead  Here for FU skin check    No h/o skin cancer        Spot  - Initial  Affected locations: face  Duration: 3 months  Signs / symptoms: scaling  Severity: mild  Timing: constant  Aggravated by: nothing  Relieving factors/Treatments tried: nothing        Review of Systems   Constitutional: Negative for fever, chills and fatigue.   Skin: Positive for wears hat. Negative for daily sunscreen use and activity-related sunscreen use.   Hematologic/Lymphatic: Does not bruise/bleed easily.        Objective:    Physical Exam   Constitutional: He appears well-developed and well-nourished. No distress.   Neurological: He is alert and oriented to person, place, and time. He is not disoriented.   Psychiatric: He has a normal mood and affect.   Skin:   Areas Examined (abnormalities noted in diagram):   Scalp / Hair Palpated and Inspected  Head / Face Inspection Performed  Neck Inspection Performed  Chest / Axilla Inspection Performed  Abdomen Inspection Performed  Back Inspection Performed  RUE Inspected  LUE Inspection Performed  Nails and Digits Inspection Performed                   Diagram Legend     Erythematous scaling macule/papule c/w actinic keratosis       Vascular papule c/w angioma      Pigmented verrucoid papule/plaque c/w seborrheic keratosis      Yellow umbilicated papule c/w sebaceous hyperplasia      Irregularly shaped tan macule c/w lentigo     1-2 mm smooth white papules consistent with Milia      Movable subcutaneous cyst with punctum c/w epidermal inclusion cyst      Subcutaneous movable cyst c/w pilar cyst      Firm pink to brown papule c/w dermatofibroma      Pedunculated fleshy papule(s) c/w skin tag(s)      Evenly pigmented macule  c/w junctional nevus     Mildly variegated pigmented, slightly irregular-bordered macule c/w mildly atypical nevus      Flesh colored to evenly pigmented papule c/w intradermal nevus       Pink pearly papule/plaque c/w basal cell carcinoma      Erythematous hyperkeratotic cursted plaque c/w SCC      Surgical scar with no sign of skin cancer recurrence      Open and closed comedones      Inflammatory papules and pustules      Verrucoid papule consistent consistent with wart     Erythematous eczematous patches and plaques     Dystrophic onycholytic nail with subungual debris c/w onychomycosis     Umbilicated papule    Erythematous-base heme-crusted tan verrucoid plaque consistent with inflamed seborrheic keratosis     Erythematous Silvery Scaling Plaque c/w Psoriasis     See annotation              Assessment / Plan:      Pathology Orders:     Normal Orders This Visit    Tissue Specimen To Pathology, Dermatology     Questions:    Directional Terms:  Other(comment)    Clinical information:  1/2- Left superior forehead, hyperkeratotic pink papule, HAK vs SCCIS 2/2- R nasal tip, depressed telangiectatic papule, BCC vs other    Specific Site:  1/2- Left superior forehead,  2/2- R nasal tip,        Neoplasm of uncertain behavior  -     Tissue Specimen To Pathology, Dermatology  Shave biopsy procedure note:x2    Shave biopsy performed after verbal consent including risk of infection, scar, recurrence, need for additional treatment of site. Area prepped with alcohol, anesthetized with approximately 1.0cc of 1% lidocaine with epinephrine. Lesional tissue shaved with razor blade. Hemostasis achieved with application of aluminum chloride followed by hyfrecation. No complications. Dressing applied. Wound care explained.    Actinic keratoses  Cryosurgery Procedure Note    Verbal consent from the patient is obtained and the patient is aware of the precancerous quality and need for treatment of these lesions. Liquid nitrogen  cryosurgery is applied to the 12 actinic keratoses, as detailed in the physical exam, to produce a freeze injury. The patient is aware that blisters may form and is instructed on wound care with gentle cleansing and use of vaseline ointment to keep moist until healed. The patient is supplied a handout on cryosurgery and is instructed to call if lesions do not completely resolve.    Repeat course of efudex BID cx 1 months, work in sections (forehead and temple first)    Seborrheic keratoses  These are benign inherited growths without a malignant potential. Reassurance given to patient. No treatment is necessary.     Solar lentigo  This is a benign hyperpigmented sun induced lesion. Daily sun protection will reduce the number of new lesions. Treatment of these benign lesions are considered cosmetic.    Eyelid dermatitis, episodic, controls with protopic  Requests refill    Patient instructed in importance in daily sun protection of at least spf 30. Mineral sunscreen ingredients preferred (Zinc +/- Titanium).   Recommend Elta MD for daily use on face and neck.  Patient encouraged to wear hat for all outdoor exposure.   Also discussed sun avoidance and use of protective clothing.             Follow-up in about 6 months (around 7/15/2019).

## 2019-01-15 NOTE — PATIENT INSTRUCTIONS
Shave Biopsy Wound Care    Your doctor has performed a shave biopsy today.  A band aid and vaseline ointment has been placed over the site.  This should remain in place for 24 hours.  It is recommended that you keep the area dry for the first 24 hours.  After 24 hours, you may remove the band aid and wash the area with warm soap and water and apply Vaseline jelly.  Many patients prefer to use Neosporin or Bacitracin ointment.  This is acceptable; however, know that you can develop an allergy to this medication even if you have used it safely for years.  It is important to keep the area moist.  Letting it dry out and get air slows healing time, and will worsen the scar.  Band aid is optional after first 24 hours.      If you notice increasing redness, tenderness, pain, or yellow drainage at the biopsy site, please notify your doctor.  These are signs of an infection.    If your biopsy site is bleeding, apply firm pressure for 15 minutes straight.  Repeat for another 15 minutes, if it is still bleeding.   If the surgical site continues to bleed, then please contact your doctor.       [facility]  [facility]  [address]  [place] [address]  Dept: [phone]  CRYOSURGERY      Your doctor has used a method called cryosurgery to treat your skin condition. Cryosurgery refers to the use of very cold substances to treat a variety of skin conditions such as warts, pre-skin cancers, molluscum contagiosum, sun spots, and several benign growths. The substance we use in cryosurgery is liquid nitrogen and is so cold (-195 degrees Celsius) that is burns when administered.     Following treatment in the office, the skin may immediately burn and become red. You may find the area around the lesion is affected as well. It is sometimes necessary to treat not only the lesion, but a small area of the surrounding normal skin to achieve a good response.     A blister, and even a blood filled blister, may  form after treatment.   This is a normal response. If the blister is painful, it is acceptable to sterilize a needle and with rubbing alcohol and gently pop the blister. It is important that you gently wash the area with soap and warm water as the blister fluid may contain wart virus if a wart was treated. Do no remove the roof of the blister.     The area treated can take anywhere from 1-3 weeks to heal. Healing time depends on the kind skin lesion treated, the location, and how aggressively the lesion was treated. It is recommended that the areas treated are covered with Vaseline or bacitracin ointment and a band-aid. If a band-aid is not practical, just ointment applied several times per day will do. Keeping these areas moist will speed the healing time.    Treatment with liquid nitrogen can leave a scar. In dark skin, it may be a light or dark scar, in light skin it may be a white or pink scar. These will generally fade with time, but may never go away completely.     If you have any concerns after your treatment, please feel free to call the office.         [facility]  [facility]  [address] [place] [address]  Dept: [phone]

## 2019-01-15 NOTE — PROGRESS NOTES
Subjective:       Patient ID: Sukumar Tay is a 68 y.o. male.    Chief Complaint: Follow-up (6mth f/u diabetes)    HPI  Review of Systems   Constitutional: Negative for fatigue and unexpected weight change.   Respiratory: Negative for chest tightness and shortness of breath.    Cardiovascular: Negative for chest pain, palpitations and leg swelling.   Gastrointestinal: Negative for abdominal pain.   Musculoskeletal: Negative for arthralgias.   Neurological: Negative for dizziness, syncope, light-headedness and headaches.       Patient Active Problem List   Diagnosis    Diabetes mellitus    Gout    Hyperlipidemia associated with type 2 diabetes mellitus     Patient is here for a chronic conditions follow up.    PRE-OP CONSULTATION FOR SURGERY    PROCEDURE:cataract  PHYSICIAN:Natanael  DATE:1/5/19    PRIOR SURGERY:   NO PROBLEM WITH ANESTHESIA    CV ROS: NO CHEST PAIN, SHORTNESS OF BREATH, POOR EXERCISE TOLERANCE.  NO H/O CAD, PALPITATIONS, SYNCOPE, CHF OR OTHER CARDIAC PROBLEMS.  PATIENT IS ABLE TO WALK A FLIGHT OF STAIRS WITHOUT STOPPING TO REST    RESP ROS: NO H/O LUNG PROBLEMS SUCH AS COPD OR ASTHMA.  PATIENT IS A NON-SMOKER    CARDIAC RF: hypercholesterolemia and type 2 diabetes mellitus      Declines flu and pneumonia vaccines  Reviewed 1/19 labs. Uric acid elevated but has had no gout attacks. Reviewed high purine foods to avoid  Objective:      Physical Exam   Constitutional: He is oriented to person, place, and time. He appears well-developed and well-nourished.   Cardiovascular: Normal rate, regular rhythm and normal heart sounds.   Pulmonary/Chest: Effort normal and breath sounds normal.   Musculoskeletal: He exhibits no edema.   Neurological: He is alert and oriented to person, place, and time.   Skin: Skin is warm and dry.   Psychiatric: He has a normal mood and affect.   Nursing note and vitals reviewed.    ecg n/a  Labs reviewed 1/19  Assessment:       1. Hyperlipidemia associated with type 2  diabetes mellitus    2. Other secondary gout, unspecified chronicity, unspecified site    3. Type 2 diabetes mellitus without complication, without long-term current use of insulin    4. Preop cardiovascular exam        Plan:       1. Hyperlipidemia associated with type 2 diabetes mellitus  Controlled on current medications.  Continue current medications.    - Comprehensive metabolic panel; Future  - Lipid panel; Future    2. Other secondary gout, unspecified chronicity, unspecified site  Avoid foods high in purine.  Monitor and start meds if sx occur  - CBC auto differential; Future  - Uric acid; Future    3. Type 2 diabetes mellitus without complication, without long-term current use of insulin  Controlled on current medications.  Continue current medications.    - Hemoglobin A1c; Future    4. Preop cardiovascular exam  Patient may proceed with surgery with low risk of perioperative cardiovascular complications.  Nielsen perioperative risk score: .15%  Patients whose estimated risk of death is less than 1 percent are labeled as being low risk and require no additional cardiovascular testing.    Higher-risk patients -- Patients whose risk of death is 1 percent or higher may require additional cardiovascular evaluation.    1. Preoperative workup as follows hemoglobin, hematocrit, electrolytes, creatinine, glucose.  2. Change in medication regimen before surgery: AVOID NSAIDS FOR 5-7 DAYS PRIOR TO PROCEDURE (optional with cataract surgery).  Continue medication regimen including morning of surgery, with sip of water.  3. Prophylaxis for cardiac events with perioperative beta-blockers: not indicated.  4. Invasive hemodynamic monitoring perioperatively: at the discretion of anesthesiologist.  5. Deep vein thrombosis prophylaxis postoperatively:regimen to be chosen by surgical team.  6. Surveillance for postoperative MI with ECG immediately postoperatively and on postoperative days 1 and 2 AND troponin levels 24 hours  postoperatively and on day 4 or hospital discharge (whichever comes first): at the discretion of anesthesiologist.              Time spent with patient: 20 minutes    Patient with be reevaluated in 6 months or sooner prn    Greater than 50% of this visit was spent counseling as described in above documentation:Yes

## 2019-01-28 ENCOUNTER — TELEPHONE (OUTPATIENT)
Dept: DERMATOLOGY | Facility: CLINIC | Age: 69
End: 2019-01-28

## 2019-01-28 NOTE — TELEPHONE ENCOUNTER
----- Message from Danielle Marks LPN sent at 1/28/2019  9:18 AM CST -----  Pt referred to Dr Nunn for MOHs  Spoke to pt. Informed biopsy result and plan of treatment. Answered all pf pt's questions and/or concerns. Verbalized understanding. Pt is expecting call from Dr Nunn's office.    Skin, right nasal tip, shave biopsy:   - BASAL CELL CARCINOMA, SUPERFICIAL TYPE.   - THE TUMOR EXTENDS TO THE DEEP AND LATERAL BIOPSY MARGINS.

## 2019-01-31 ENCOUNTER — PATIENT MESSAGE (OUTPATIENT)
Dept: DERMATOLOGY | Facility: CLINIC | Age: 69
End: 2019-01-31

## 2019-03-21 ENCOUNTER — TELEPHONE (OUTPATIENT)
Dept: DERMATOLOGY | Facility: CLINIC | Age: 69
End: 2019-03-21

## 2019-03-26 ENCOUNTER — PES CALL (OUTPATIENT)
Dept: ADMINISTRATIVE | Facility: CLINIC | Age: 69
End: 2019-03-26

## 2019-04-12 ENCOUNTER — INITIAL CONSULT (OUTPATIENT)
Dept: DERMATOLOGY | Facility: CLINIC | Age: 69
End: 2019-04-12
Payer: MEDICARE

## 2019-04-12 VITALS
DIASTOLIC BLOOD PRESSURE: 58 MMHG | HEIGHT: 63 IN | HEART RATE: 86 BPM | BODY MASS INDEX: 20.38 KG/M2 | WEIGHT: 115 LBS | SYSTOLIC BLOOD PRESSURE: 88 MMHG

## 2019-04-12 DIAGNOSIS — C44.311 BASAL CELL CARCINOMA OF NOSE: Primary | ICD-10-CM

## 2019-04-12 PROCEDURE — 99999 PR PBB SHADOW E&M-EST. PATIENT-LVL III: CPT | Mod: PBBFAC,HCNC,, | Performed by: DERMATOLOGY

## 2019-04-12 PROCEDURE — 99499 RISK ADDL DX/OHS AUDIT: ICD-10-PCS | Mod: HCNC,S$GLB,, | Performed by: DERMATOLOGY

## 2019-04-12 PROCEDURE — 99999 PR PBB SHADOW E&M-EST. PATIENT-LVL III: ICD-10-PCS | Mod: PBBFAC,HCNC,, | Performed by: DERMATOLOGY

## 2019-04-12 PROCEDURE — 1101F PR PT FALLS ASSESS DOC 0-1 FALLS W/OUT INJ PAST YR: ICD-10-PCS | Mod: HCNC,CPTII,S$GLB, | Performed by: DERMATOLOGY

## 2019-04-12 PROCEDURE — 1101F PT FALLS ASSESS-DOCD LE1/YR: CPT | Mod: HCNC,CPTII,S$GLB, | Performed by: DERMATOLOGY

## 2019-04-12 PROCEDURE — 99214 OFFICE O/P EST MOD 30 MIN: CPT | Mod: HCNC,S$GLB,, | Performed by: DERMATOLOGY

## 2019-04-12 PROCEDURE — 99499 UNLISTED E&M SERVICE: CPT | Mod: HCNC,S$GLB,, | Performed by: DERMATOLOGY

## 2019-04-12 PROCEDURE — 99214 PR OFFICE/OUTPT VISIT, EST, LEVL IV, 30-39 MIN: ICD-10-PCS | Mod: HCNC,S$GLB,, | Performed by: DERMATOLOGY

## 2019-04-12 NOTE — LETTER
April 13, 2019      Mercedes Beatty MD  [address] [place] [address] [address]  [place] [address]           [place] [facility] [address] [facility] [address] [place] [address]  Phone: [phone]          Patient: Sukumar Tay   MR Number: [id number]   YOB: 1950   Date of Visit: 4/12/2019       Dear Dr. Mercedes Beatty:    Thank you for referring Sukumar Tay to me for evaluation. Attached you will find relevant portions of my assessment and plan of care.    If you have questions, please do not hesitate to call me. I look forward to following Sukumar Tay along with you.    Sincerely,    Silas Nunn MD    Enclosure  CC:  No Recipients    If you would like to receive this communication electronically, please contact [email or web address] or [phone] to request more information on [employer] Link access.    For providers and/or their staff who would like to refer a patient to [facility], please contact us through our one-stop-shop provider referral line, [facility], at [phone].    If you feel you have received this communication in error or would no longer like to receive these types of communications, please e-mail [email or web address]

## 2019-04-12 NOTE — PROGRESS NOTES
ALLERGIES:  Bactrim [sulfamethoxazole-trimethoprim] and Sulfa (sulfonamide antibiotics)    CHIEF COMPLAINT:  This 68 y.o. male comes for evaluation for Mohs' Micrographic Surgery, Fresh Tissue Technique, for treatment of a biopsy-proven superficial basal cell carcinoma on the right nasal tip. Consultation requested by Mercedes Beatty M.D...    HISTORY OF PRESENT ILLNESS:   Location: right nasal tip  Duration: 8 months or more  Quality: persistent  Context: status post biopsy by Mercedes Beatty M.D; path = superficial basal cell carcinoma; pathology accession # [id number], [facility]  Has recently used efudex elsewhere    Prior Treatment: none  See also the handwritten notes/diagrams scanned to chart for additional details.    Defibrillator: No  Pacemaker: No  Artificial heart valves: No  Artificial joints: No    REVIEW OF SYSTEMS:   General: general health good  Skin: has no previous history of skin cancer(s)  CV: has no hypertension, no artificial valve  Resp: has no respiratory problems  Endo: has diabetes type II  Hem/Lymph: taking prescribed anticoagulants-ASPIRIN  Allergy/Immuno: has allergies as noted above  GI: has no history of hepatitis  MS: as noted above     PAST MEDICAL HISTORY:  Past Medical History:   Diagnosis Date    Dysuria     H. pylori infection        PAST SURGICAL HISTORY:  History reviewed. No pertinent surgical history.     SOCIAL HISTORY:  Dependencies: smoking status as noted below  Social History     Tobacco Use    Smoking status: Never Smoker    Smokeless tobacco: Never Used   Substance Use Topics    Alcohol use: No     Alcohol/week: 0.0 oz    Drug use: No       PERTINENT MEDICATIONS:  See medications list.    Current Outpatient Medications:     ASPIRIN (ASPIR-81 ORAL), Take by mouth., Disp: , Rfl:     blood sugar diagnostic Strp, 1 each by Misc.(Non-Drug; Combo Route) route once daily., Disp: 100 each, Rfl: 3    blood-glucose meter kit, 1 each by Other route once daily.  TrueTest Metric Air, Disp: 1 each, Rfl: 0    lancets Misc, True Test Metric Air.  Use to test blood sugar daily., Disp: 100 each, Rfl: 3    lisinopril (PRINIVIL,ZESTRIL) 5 MG tablet, Take 1 tablet (5 mg total) by mouth once daily., Disp: 90 tablet, Rfl: 3    metFORMIN (GLUCOPHAGE) 500 MG tablet, Take 1 tablet (500 mg total) by mouth daily with breakfast., Disp: 90 tablet, Rfl: 3    phenylephrine (SUDAFED PE) 10 MG Tab, , Disp: , Rfl:     pravastatin (PRAVACHOL) 40 MG tablet, Take 1 tablet (40 mg total) by mouth every evening., Disp: 90 tablet, Rfl: 3    tacrolimus (PROTOPIC) 0.1 % ointment, Thin film to eyelids BID PRN itchy red flare, Disp: 30 g, Rfl: 2    ALLERGIES:  Bactrim [sulfamethoxazole-trimethoprim] and Sulfa (sulfonamide antibiotics)    EXAM:  See also the handwritten notes/diagrams scanned to chart for additional details.  Constitutional  General appearance: well-developed, well-nourished, well-kempt older white male    Eyes  Inspection of conjunctivae and lids reveals no abnormalities; sclerae anicteric  Neurologic/Psychiatric  Alert,  normal orientation to time, place, person  Normal mood and affect with no evidence of depression, anxiety, agitation  Skin:   Skin: see photo(s)  Head: background marked solar damage to exposed areas of skin; in addition, inspection/palpation reveals an approximately 6 mm depression on the right nose tip; site(s) confirmed by reference to the photograph(s) in the chart taken at the time of the biopsy/biopsies by the referring physician and he confirmed this as the site of the prior biopsy; there is presently no evidence of residual superficial basal cell carcinoma to the area; there are other areas of erythema and scaling on his nose which are suggestive of actinic keratosis clinically  Neck: examination reveals marked chronic solar damage  Right upper extremity: examination reveals marked chronic solar damage  Left upper extremity: examination reveals marked chronic  solar damage             ASSESSMENT: biopsy-proven basal cell carcinoma of the right nose tip  chronic solar damage to areas as noted above  Long term current use of aspirin     PLAN:  The diagnosis of the lesion and management options, and risks and benefits of the alternatives, including observation/non-treatment, treatment with a topical agent, radiation treatment, excision with vertical frozen section or paraffin-embedded section margin evaluation, and Mohs' Micrographic Surgery, Fresh Tissue Technique, were discussed at length with the patient. In particular, the discussion included, but was not limited to, the following:     One alternative at this point would be to defer further treatment and observe the lesion. With small skin cancers of this kind, it is possible that a biopsy can be sufficient to definitively treat a small skin cancer of this kind. Alternatively, some skin cancers are slow growing and do not require immediate treatment. The potential advantage of this choice would be to avoid the need for possibly unnecessary additional surgery. Among the potential disadvantages of this would be the possibility of enlargement of the lesion, more extensive spread of the lesion or recurrence at a later date, which might necessitate a larger and more complex surgery.     Topical treatment with 5-fluorouracil or imiquimod or another similar agent could be an effective treatment for a superficial, early skin cancer of this kind.  The usual course of treatment is application of the medication for 6+ weeks. Local irritation will result from treatment, although no systemic side effects are expected. The potential advantage of topical treatment is that it avoids the need for surgery. Among the disadvantages of this treatment are the length of treatment, the local inflammatory response, the absence of pathologic confirmation of the removal of the skin cancer, and the possible risk of recurrence at a later date.      Radiation treatment can be an effective treatment for this type of skin cancer. The usual course of treatment is every weekday for several weeks. Local irritation will result from treatment, although no systemic side effects are expected. The potential advantage of radiation treatment is that it avoids the need for surgery. Among the disadvantages of radiation treatment are the length of treatment, the local inflammatory response, the absence of pathologic confirmation of the removal of the skin cancer, a possible increased risk of additional skin cancer in the treated area in later years, and a somewhat increased risk of recurrence at a later date.      Excisional surgery can be an effective treatment for this type of skin cancer. This would involve excision of the lesion with margin evaluation by submitting the specimen to a pathologist for either immediate marginal assessment via frozen section processing, or delayed marginal assessment by fixed-tissue processing. The potential advantage of this technique is that it offers a way of treating the lesion with some degree of histologic confirmation of tumor removal. Among the disadvantages of this treatment are the possible need for re-excision if marginal involvement is identified, a somewhat greater likelihood of recurrence as compared to Mohs' surgery because of the less comprehensive margin evaluation inherent in the technique, and the general potential risks of surgery, including allergic reactions to the anesthetic and other materials used, infection, injury to nerves in the area with consequent loss of sensation or muscle function, and scarring or distortion of surrounding structures.     Mohs' surgery is a very effective treatment for this type of skin cancer. The potential advantage of Mohs' surgery is that this technique offers the greatest possible certainty of knowing that the skin cancer has been completely removed, with the removal of the least amount  of normal tissue. The potential disadvantages of Mohs' surgery include the duration of the surgery, the possible need for a separate surgery for reconstruction following tumor removal, and scarring as a result. In addition, general potential risks of surgery as noted above also apply to treatment via Mohs' surgery.     In light of the clinical findings, and other changes to the area, topical treatment with efudex was thought to be the most appropriate management choice.      Sufficient time was available for questions, and all questions were answered to his satisfaction. He fully understands the aims, risks, alternatives, and possible complications, and has elected to proceed with in this manner.     I reviewed the use of the medication and the anticipated effects.     He is to followup two week(s) after starting treatment, after an upcoming trip, for assessment of the treatment, and to call PRN sooner with any questions or problems.  --------------------------------------  Note: Some or all of this note may have been generated using voice recognition software. There may be voice recognition errors including grammatical and/or spelling errors found in the text. Attempts were made to correct these errors prior to signature.

## 2019-06-13 ENCOUNTER — OFFICE VISIT (OUTPATIENT)
Dept: DERMATOLOGY | Facility: CLINIC | Age: 69
End: 2019-06-13
Payer: MEDICARE

## 2019-06-13 DIAGNOSIS — L25.1 DERMATITIS MEDICAMENTOSA (DRUG APPLIED TO SKIN): Primary | ICD-10-CM

## 2019-06-13 DIAGNOSIS — T49.95XA DERMATITIS MEDICAMENTOSA (DRUG APPLIED TO SKIN): Primary | ICD-10-CM

## 2019-06-13 DIAGNOSIS — C44.311 BASAL CELL CARCINOMA OF NOSE: ICD-10-CM

## 2019-06-13 PROCEDURE — 1101F PT FALLS ASSESS-DOCD LE1/YR: CPT | Mod: HCNC,CPTII,S$GLB, | Performed by: DERMATOLOGY

## 2019-06-13 PROCEDURE — 99212 OFFICE O/P EST SF 10 MIN: CPT | Mod: HCNC,S$GLB,, | Performed by: DERMATOLOGY

## 2019-06-13 PROCEDURE — 99212 PR OFFICE/OUTPT VISIT, EST, LEVL II, 10-19 MIN: ICD-10-PCS | Mod: HCNC,S$GLB,, | Performed by: DERMATOLOGY

## 2019-06-13 PROCEDURE — 99999 PR PBB SHADOW E&M-EST. PATIENT-LVL II: ICD-10-PCS | Mod: PBBFAC,HCNC,, | Performed by: DERMATOLOGY

## 2019-06-13 PROCEDURE — 1101F PR PT FALLS ASSESS DOC 0-1 FALLS W/OUT INJ PAST YR: ICD-10-PCS | Mod: HCNC,CPTII,S$GLB, | Performed by: DERMATOLOGY

## 2019-06-13 PROCEDURE — 99999 PR PBB SHADOW E&M-EST. PATIENT-LVL II: CPT | Mod: PBBFAC,HCNC,, | Performed by: DERMATOLOGY

## 2019-06-13 NOTE — PROGRESS NOTES
CHIEF COMPLAINT: followup treatment with topical fluoruracil    HISTORY OF PRESENT ILLNESS:  Location(s): nose  Duration: has been applying the medication for 2 weeks  Quality: some irritation  Context: applying medication BID  See path below    Prior path:   Skin, right nasal tip, shave biopsy:   - BASAL CELL CARCINOMA, SUPERFICIAL TYPE.   - THE TUMOR EXTENDS TO THE DEEP AND LATERAL BIOPSY MARGINS.    EXAMINATION:  Skin: there are areas of erythema with scaling/crusting to areas of application on the nose and left cheek; typical in appearance for fluorouracil-induced dermatitis  See photo        ASSESSMENT:  Typical dermatitis due to application of fluoruracil    PLAN:  Current status and management options, and risks, benefits, and alternatives were discussed.  continue application of fluorouracil  Followup 2 weeks  Call prn sooner  --------------------------------------  Note: Some or all of this note may have been generated using voice recognition software. There may be voice recognition errors including grammatical and/or spelling errors found in the text. Attempts were made to correct these errors prior to signature.

## 2019-06-27 ENCOUNTER — OFFICE VISIT (OUTPATIENT)
Dept: DERMATOLOGY | Facility: CLINIC | Age: 69
End: 2019-06-27
Payer: MEDICARE

## 2019-06-27 DIAGNOSIS — T49.95XA DERMATITIS MEDICAMENTOSA (DRUG APPLIED TO SKIN): ICD-10-CM

## 2019-06-27 DIAGNOSIS — C44.311 BASAL CELL CARCINOMA OF NOSE: Primary | ICD-10-CM

## 2019-06-27 DIAGNOSIS — L25.1 DERMATITIS MEDICAMENTOSA (DRUG APPLIED TO SKIN): ICD-10-CM

## 2019-06-27 PROCEDURE — 1101F PT FALLS ASSESS-DOCD LE1/YR: CPT | Mod: HCNC,CPTII,S$GLB, | Performed by: DERMATOLOGY

## 2019-06-27 PROCEDURE — 99999 PR PBB SHADOW E&M-EST. PATIENT-LVL II: ICD-10-PCS | Mod: PBBFAC,HCNC,, | Performed by: DERMATOLOGY

## 2019-06-27 PROCEDURE — 99212 OFFICE O/P EST SF 10 MIN: CPT | Mod: HCNC,S$GLB,, | Performed by: DERMATOLOGY

## 2019-06-27 PROCEDURE — 99999 PR PBB SHADOW E&M-EST. PATIENT-LVL II: CPT | Mod: PBBFAC,HCNC,, | Performed by: DERMATOLOGY

## 2019-06-27 PROCEDURE — 1101F PR PT FALLS ASSESS DOC 0-1 FALLS W/OUT INJ PAST YR: ICD-10-PCS | Mod: HCNC,CPTII,S$GLB, | Performed by: DERMATOLOGY

## 2019-06-27 PROCEDURE — 99212 PR OFFICE/OUTPT VISIT, EST, LEVL II, 10-19 MIN: ICD-10-PCS | Mod: HCNC,S$GLB,, | Performed by: DERMATOLOGY

## 2019-06-27 NOTE — PROGRESS NOTES
CHIEF COMPLAINT: followup treatment with topical fluoruracil    HISTORY OF PRESENT ILLNESS:  Location(s): nose  Duration: has been applying the medication for 4 weeks  Quality: some irritation  Context: applying medication BID  status post previous biopsy right nose tip; see below  See path below  At his consult, there were no signs of residual superficial basal cell carcinoma  to the site on the right nose tip    Prior path:   Skin, right nasal tip, shave biopsy:   - BASAL CELL CARCINOMA, SUPERFICIAL TYPE.   - THE TUMOR EXTENDS TO THE DEEP AND LATERAL BIOPSY MARGINS.    EXAMINATION:  Skin: there are areas of somewhat more florid erythema with scaling/crusting to areas of application on the nose and left cheek than at his last visit; typical in appearance for fluorouracil-induced dermatitis  See photo    Photo today      Photo two weeks ago      Photo from consultation      Photo from biopsy      ASSESSMENT:  status post biopsy, superficial basal cell carcinoma, right nose tip  brisk dermatitis due to application of fluoruracil    PLAN:  Current status and management options, and risks, benefits, and alternatives were discussed.  Hold fluorouracil  Followup 4 months for reassessment  Call prn sooner  --------------------------------------  Note: Some or all of this note may have been generated using voice recognition software. There may be voice recognition errors including grammatical and/or spelling errors found in the text. Attempts were made to correct these errors prior to signature.

## 2019-06-28 ENCOUNTER — LAB VISIT (OUTPATIENT)
Dept: LAB | Facility: HOSPITAL | Age: 69
End: 2019-06-28
Attending: FAMILY MEDICINE
Payer: MEDICARE

## 2019-06-28 DIAGNOSIS — E78.5 HYPERLIPIDEMIA ASSOCIATED WITH TYPE 2 DIABETES MELLITUS: ICD-10-CM

## 2019-06-28 DIAGNOSIS — M10.40 OTHER SECONDARY GOUT, UNSPECIFIED CHRONICITY, UNSPECIFIED SITE: ICD-10-CM

## 2019-06-28 DIAGNOSIS — E11.9 TYPE 2 DIABETES MELLITUS WITHOUT COMPLICATION, WITHOUT LONG-TERM CURRENT USE OF INSULIN: ICD-10-CM

## 2019-06-28 DIAGNOSIS — E11.69 HYPERLIPIDEMIA ASSOCIATED WITH TYPE 2 DIABETES MELLITUS: ICD-10-CM

## 2019-06-28 LAB
ALBUMIN SERPL BCP-MCNC: 4 G/DL (ref 3.5–5.2)
ALP SERPL-CCNC: 59 U/L (ref 55–135)
ALT SERPL W/O P-5'-P-CCNC: 22 U/L (ref 10–44)
ANION GAP SERPL CALC-SCNC: 6 MMOL/L (ref 8–16)
AST SERPL-CCNC: 32 U/L (ref 10–40)
BASOPHILS # BLD AUTO: 0.03 K/UL (ref 0–0.2)
BASOPHILS NFR BLD: 0.4 % (ref 0–1.9)
BILIRUB SERPL-MCNC: 0.5 MG/DL (ref 0.1–1)
BUN SERPL-MCNC: 29 MG/DL (ref 8–23)
CALCIUM SERPL-MCNC: 9.8 MG/DL (ref 8.7–10.5)
CHLORIDE SERPL-SCNC: 103 MMOL/L (ref 95–110)
CHOLEST SERPL-MCNC: 135 MG/DL (ref 120–199)
CHOLEST/HDLC SERPL: 3.3 {RATIO} (ref 2–5)
CO2 SERPL-SCNC: 30 MMOL/L (ref 23–29)
CREAT SERPL-MCNC: 1.1 MG/DL (ref 0.5–1.4)
DIFFERENTIAL METHOD: ABNORMAL
EOSINOPHIL # BLD AUTO: 0.2 K/UL (ref 0–0.5)
EOSINOPHIL NFR BLD: 3.6 % (ref 0–8)
ERYTHROCYTE [DISTWIDTH] IN BLOOD BY AUTOMATED COUNT: 11.9 % (ref 11.5–14.5)
EST. GFR  (AFRICAN AMERICAN): >60 ML/MIN/1.73 M^2
EST. GFR  (NON AFRICAN AMERICAN): >60 ML/MIN/1.73 M^2
ESTIMATED AVG GLUCOSE: 114 MG/DL (ref 68–131)
GLUCOSE SERPL-MCNC: 107 MG/DL (ref 70–110)
HBA1C MFR BLD HPLC: 5.6 % (ref 4–5.6)
HCT VFR BLD AUTO: 41.6 % (ref 40–54)
HDLC SERPL-MCNC: 41 MG/DL (ref 40–75)
HDLC SERPL: 30.4 % (ref 20–50)
HGB BLD-MCNC: 13.6 G/DL (ref 14–18)
IMM GRANULOCYTES # BLD AUTO: 0.02 K/UL (ref 0–0.04)
IMM GRANULOCYTES NFR BLD AUTO: 0.3 % (ref 0–0.5)
LDLC SERPL CALC-MCNC: 84 MG/DL (ref 63–159)
LYMPHOCYTES # BLD AUTO: 2 K/UL (ref 1–4.8)
LYMPHOCYTES NFR BLD: 30.2 % (ref 18–48)
MCH RBC QN AUTO: 31.6 PG (ref 27–31)
MCHC RBC AUTO-ENTMCNC: 32.7 G/DL (ref 32–36)
MCV RBC AUTO: 97 FL (ref 82–98)
MONOCYTES # BLD AUTO: 0.6 K/UL (ref 0.3–1)
MONOCYTES NFR BLD: 8.4 % (ref 4–15)
NEUTROPHILS # BLD AUTO: 3.9 K/UL (ref 1.8–7.7)
NEUTROPHILS NFR BLD: 57.1 % (ref 38–73)
NONHDLC SERPL-MCNC: 94 MG/DL
NRBC BLD-RTO: 0 /100 WBC
PLATELET # BLD AUTO: 211 K/UL (ref 150–350)
PMV BLD AUTO: 9 FL (ref 9.2–12.9)
POTASSIUM SERPL-SCNC: 5 MMOL/L (ref 3.5–5.1)
PROT SERPL-MCNC: 7.6 G/DL (ref 6–8.4)
RBC # BLD AUTO: 4.3 M/UL (ref 4.6–6.2)
SODIUM SERPL-SCNC: 139 MMOL/L (ref 136–145)
TRIGL SERPL-MCNC: 50 MG/DL (ref 30–150)
URATE SERPL-MCNC: 6.9 MG/DL (ref 3.4–7)
WBC # BLD AUTO: 6.75 K/UL (ref 3.9–12.7)

## 2019-06-28 PROCEDURE — 36415 COLL VENOUS BLD VENIPUNCTURE: CPT | Mod: HCNC,PO

## 2019-06-28 PROCEDURE — 80053 COMPREHEN METABOLIC PANEL: CPT | Mod: HCNC

## 2019-06-28 PROCEDURE — 80061 LIPID PANEL: CPT | Mod: HCNC

## 2019-06-28 PROCEDURE — 83036 HEMOGLOBIN GLYCOSYLATED A1C: CPT | Mod: HCNC

## 2019-06-28 PROCEDURE — 85025 COMPLETE CBC W/AUTO DIFF WBC: CPT | Mod: HCNC

## 2019-06-28 PROCEDURE — 84550 ASSAY OF BLOOD/URIC ACID: CPT | Mod: HCNC

## 2019-07-07 DIAGNOSIS — E11.9 TYPE 2 DIABETES MELLITUS WITHOUT COMPLICATION, WITHOUT LONG-TERM CURRENT USE OF INSULIN: ICD-10-CM

## 2019-07-07 DIAGNOSIS — E11.69 HYPERLIPIDEMIA ASSOCIATED WITH TYPE 2 DIABETES MELLITUS: ICD-10-CM

## 2019-07-07 DIAGNOSIS — E78.5 HYPERLIPIDEMIA ASSOCIATED WITH TYPE 2 DIABETES MELLITUS: ICD-10-CM

## 2019-07-08 RX ORDER — METFORMIN HYDROCHLORIDE 500 MG/1
TABLET ORAL
Qty: 90 TABLET | Refills: 3 | Status: SHIPPED | OUTPATIENT
Start: 2019-07-08 | End: 2020-06-30

## 2019-07-08 RX ORDER — LISINOPRIL 5 MG/1
5 TABLET ORAL DAILY
Qty: 90 TABLET | Refills: 3 | Status: SHIPPED | OUTPATIENT
Start: 2019-07-08 | End: 2020-06-30

## 2019-07-08 RX ORDER — PRAVASTATIN SODIUM 40 MG/1
40 TABLET ORAL NIGHTLY
Qty: 90 TABLET | Refills: 3 | Status: SHIPPED | OUTPATIENT
Start: 2019-07-08 | End: 2020-06-30

## 2019-08-20 ENCOUNTER — PATIENT OUTREACH (OUTPATIENT)
Dept: ADMINISTRATIVE | Facility: HOSPITAL | Age: 69
End: 2019-08-20

## 2019-08-20 NOTE — LETTER
August 27, 2019    Sukumar Tay  [address] [facility] [facility] [place] [address]             [facility]  [address]  [place] [address]  Phone: [phone] [facility] is committed to your overall health.  To help you get the most out of each of your visits, we will review your information to make sure you are up to date on all of your recommended tests and/or procedures.       Dr. Petrona Rincon MD has found that your chart shows you may be due for a:     DIABETIC FOOT EXAM   TETANUS VACCINE   Pneumococcal Vaccine   SHINGLES VACCINE           If you have had any of the above done at another facility, please bring the records or information with you so that your record at [facility] will be complete.  If you would like to schedule any of these, please contact the clinic at [phone].     If you are currently taking medication, please bring it with you to your appointment for review.     Also, if you have any type of Advanced Directives, please bring them with you to your office visit so we may scan them into your chart.     Thank You,     Your [facility] Team,   MD Roula Altamirano LPN Clinical Care Coordinator   [facility] [address] [place] [address]   Phone [phone]   Fax [phone]

## 2019-08-20 NOTE — PROGRESS NOTES
Health Maintenance Due   Topic Date Due    TETANUS VACCINE  07/04/1968    Pneumococcal Vaccine (65+ High/Highest Risk) (1 of 2 - PCV13) 07/04/2016    Foot Exam  07/03/2019

## 2019-08-30 ENCOUNTER — OFFICE VISIT (OUTPATIENT)
Dept: FAMILY MEDICINE | Facility: CLINIC | Age: 69
End: 2019-08-30
Payer: MEDICARE

## 2019-08-30 VITALS
HEIGHT: 63 IN | HEART RATE: 61 BPM | SYSTOLIC BLOOD PRESSURE: 100 MMHG | WEIGHT: 119.94 LBS | TEMPERATURE: 98 F | RESPIRATION RATE: 12 BRPM | BODY MASS INDEX: 21.25 KG/M2 | OXYGEN SATURATION: 97 % | DIASTOLIC BLOOD PRESSURE: 58 MMHG

## 2019-08-30 DIAGNOSIS — E78.5 HYPERLIPIDEMIA ASSOCIATED WITH TYPE 2 DIABETES MELLITUS: Primary | ICD-10-CM

## 2019-08-30 DIAGNOSIS — Z23 NEED FOR VACCINATION WITH 13-POLYVALENT PNEUMOCOCCAL CONJUGATE VACCINE: ICD-10-CM

## 2019-08-30 DIAGNOSIS — E11.9 TYPE 2 DIABETES MELLITUS WITHOUT COMPLICATION, UNSPECIFIED WHETHER LONG TERM INSULIN USE: ICD-10-CM

## 2019-08-30 DIAGNOSIS — E11.69 HYPERLIPIDEMIA ASSOCIATED WITH TYPE 2 DIABETES MELLITUS: Primary | ICD-10-CM

## 2019-08-30 DIAGNOSIS — Z12.5 PROSTATE CANCER SCREENING: ICD-10-CM

## 2019-08-30 DIAGNOSIS — L84 CORN OF TOE: ICD-10-CM

## 2019-08-30 DIAGNOSIS — M10.40 OTHER SECONDARY GOUT, UNSPECIFIED CHRONICITY, UNSPECIFIED SITE: ICD-10-CM

## 2019-08-30 PROCEDURE — 99214 PR OFFICE/OUTPT VISIT, EST, LEVL IV, 30-39 MIN: ICD-10-PCS | Mod: HCNC,S$GLB,, | Performed by: FAMILY MEDICINE

## 2019-08-30 PROCEDURE — 99214 OFFICE O/P EST MOD 30 MIN: CPT | Mod: HCNC,S$GLB,, | Performed by: FAMILY MEDICINE

## 2019-08-30 PROCEDURE — 3044F HG A1C LEVEL LT 7.0%: CPT | Mod: HCNC,CPTII,S$GLB, | Performed by: FAMILY MEDICINE

## 2019-08-30 PROCEDURE — 99999 PR PBB SHADOW E&M-EST. PATIENT-LVL IV: ICD-10-PCS | Mod: PBBFAC,HCNC,, | Performed by: FAMILY MEDICINE

## 2019-08-30 PROCEDURE — 1101F PR PT FALLS ASSESS DOC 0-1 FALLS W/OUT INJ PAST YR: ICD-10-PCS | Mod: HCNC,CPTII,S$GLB, | Performed by: FAMILY MEDICINE

## 2019-08-30 PROCEDURE — 3044F PR MOST RECENT HEMOGLOBIN A1C LEVEL <7.0%: ICD-10-PCS | Mod: HCNC,CPTII,S$GLB, | Performed by: FAMILY MEDICINE

## 2019-08-30 PROCEDURE — 99999 PR PBB SHADOW E&M-EST. PATIENT-LVL IV: CPT | Mod: PBBFAC,HCNC,, | Performed by: FAMILY MEDICINE

## 2019-08-30 PROCEDURE — 1101F PT FALLS ASSESS-DOCD LE1/YR: CPT | Mod: HCNC,CPTII,S$GLB, | Performed by: FAMILY MEDICINE

## 2019-08-30 NOTE — PROGRESS NOTES
podiat+Subjective:       Patient ID: Sukumar Tay is a 69 y.o. male.    Chief Complaint: Annual Exam    HPI  Review of Systems   Constitutional: Negative for fatigue and unexpected weight change.   Respiratory: Negative for chest tightness and shortness of breath.    Cardiovascular: Negative for chest pain, palpitations and leg swelling.   Gastrointestinal: Negative for abdominal pain.   Musculoskeletal: Negative for arthralgias.   Neurological: Negative for dizziness, syncope, light-headedness and headaches.       Patient Active Problem List   Diagnosis    Diabetes mellitus    Gout    Hyperlipidemia associated with type 2 diabetes mellitus     Patient is here for a chronic conditions follow up.    Type 2 DM on asa, lisinopril and pravastatin    Having some mild right ear hearing loss and ocean sounds occ.  Sometimes feels like water is in it. No pain      Reviewed labs 6/19- no gout attacks    Has painful area on medial side of distal left great toe-corn. Has bunion   Objective:      Physical Exam   Constitutional: He is oriented to person, place, and time. He appears well-developed and well-nourished.   Cardiovascular: Normal rate, regular rhythm and normal heart sounds.   Pulmonary/Chest: Effort normal and breath sounds normal.   Musculoskeletal: He exhibits no edema.   Neurological: He is alert and oriented to person, place, and time.   Skin: Skin is warm and dry.   Psychiatric: He has a normal mood and affect.   Nursing note and vitals reviewed.      Assessment:       1. Hyperlipidemia associated with type 2 diabetes mellitus    2. Type 2 diabetes mellitus without complication, unspecified whether long term insulin use    3. Other secondary gout, unspecified chronicity, unspecified site    4. Prostate cancer screening    5. Need for vaccination with 13-polyvalent pneumococcal conjugate vaccine    6. Corn of toe        Plan:       1. Hyperlipidemia associated with type 2 diabetes mellitus  Controlled on  current medications.  Continue current medications.    - Lipid panel; Future    2. Type 2 diabetes mellitus without complication, unspecified whether long term insulin use  Controlled on current medications.  Continue current medications.    - CBC auto differential; Future  - Comprehensive metabolic panel; Future  - Hemoglobin A1c; Future  - Microalbumin/creatinine urine ratio; Future    3. Other secondary gout, unspecified chronicity, unspecified site  Screen and treat as indicated:    - Uric acid; Future    4. Prostate cancer screening  Discussed benefits, risks and limitations of PSA testing and current USPSTF guidelines.  Patient expressed verbal understanding and wished to pursue screening.    - PSA, Screening; Future    5. Need for vaccination with 13-polyvalent pneumococcal conjugate vaccine  declined    6. Corn of toe  Refer for eval and treat  - Ambulatory referral to Podiatry        Time spent with patient: 20 minutes    Patient with be reevaluated in 1 year or sooner prn    Greater than 50% of this visit was spent counseling as described in above documentation:Yes

## 2019-08-30 NOTE — PATIENT INSTRUCTIONS
Diabetes (General Information)  Diabetes is a long-term health problem. It means your body does not make enough insulin. Or it may mean that your body cannot use the insulin it makes. Insulin is a hormone in your body. It lets blood sugar (glucose) reach the cells in your body. All of your cells need glucose for fuel.  When you have diabetes, the glucose in your blood builds up because it cannot get into the cells. This buildup is called high blood sugar (hyperglycemia).  Your blood sugar level depends on several things. It depends on what kind of food you eat and how much of it you eat. It also depends on how much exercise you get, and how much insulin you have in your body. Eating too much of the wrong kinds of food or not taking diabetes medicine on time can cause high blood sugar. Infections can cause high blood sugar even if you are taking medicines correctly.  These things can also cause low blood sugar:  · Missing meals  · Not eating enough food  · Taking too much diabetes medicine  Diabetes can cause serious problems over time if you do not get treated. These problems include heart disease, stroke, kidney failure, and blindness. They also include nerve pain or loss of feeling in your legs and feet, and gangrene of the feet. By keeping your blood sugar under control you can prevent or delay these problems.  Normal blood sugar levels are 80 to 100 before a meal and less than 180 in the 1 to 2 hours after a meal.  Home care  Follow these guidelines when caring for yourself at home:  · Follow the diet your healthcare provider gives you. Take insulin or other diabetes medicine exactly as told to.  · Watch your blood sugar as you are told to. Keep a log of your results. This will help your provider change your medicines to keep your blood sugar under control.  · Try to reach your ideal weight. You may be able to cut back on or not have to take diabetes medicine if you eat the right foods and get exercise.  · Do  not smoke. Smoking worsens the effects of diabetes on your circulation. You are much more likely to have a heart attack if you have diabetes and you smoke.  · Take good care of your feet. If you have lost feeling in your feet, you may not see an injury or infection. Check your feet and between your toes at least once a week.  · Wear a medical alert bracelet or necklace, or carry a card in your wallet that says you have diabetes. This will help healthcare providers give you the right care if you get very ill and cannot tell them that you have diabetes.  Sick day plan  If you get a cold, the flu, or a bacterial or viral infection, take these steps:  · Look at your diabetes sick plan and call your healthcare provider as you were told to. You may need to call your provider right away if:  ¨ Your blood sugar is above 240 while taking your diabetes medicine  ¨ Your urine ketone levels are above normal or high  ¨ You have been vomiting more than 6 hours  ¨ You have trouble breathing or your breath ha s a fruity smell  ¨ You have a high fever  ¨ You have a fever for several days and you are not getting better  ¨ You get light-headed and are sleepier than usual  · Keep taking your diabetes pills (oral medicine) even if you have been vomiting and are feeling sick. Call your provider right away because you may need insulin to lower your blood sugar until you recover from your illness.  · Keep taking your insulin even if you have been vomiting and are feeling sick. Call your provider right away to ask if you need to change your insulin dose. This will depend on your blood sugar results.  · Check your blood sugar every 2 to 4 hours, or at least 4 times a day.  · Check your ketones often. If you are vomiting and having diarrhea, watch them more often.  · Do not skip meals. Try to eat small meals on a regular schedule. Do this even if you do not feel like eating.  · Drink water or other liquids that do not have caffeine or  calories. This will keep you from getting dehydrated. If you are nauseated or vomiting, takes small sips every 5 minutes. To prevent dehydration try to drink a cup (8 ounces) of fluids every hour while you are awake.  General care  Always bring a source of fast-acting sugar with you in case you have symptoms of low blood sugar (below 70). At the first sign of low blood sugar, eat or drink 15 to 20 grams of fast-acting sugar to raise your blood sugar. Examples are:  · 3 to 4 glucose tablets. You can buy these at most [employer]  · 4 ounces (1/2 cup) of regular (not diet) soft drinks  · 4 ounces (1/2 cup) of any fruit juice  · 8 ounces (1 cup) of milk  · 5 to 6 pieces of hard candy  · 1 tablespoon of honey  Check your blood sugar 15 minutes after treating yourself. If it is still below 70, take 15 to 20 more grams of fast-acting sugar. Test again in 15 minutes. If it returns to normal (70 or above), eat a snack or meal to keep your blood sugar in a safe range. If it stays low, call your doctor or go to an emergency room.  Follow-up care  Follow-up with your healthcare provider, or as advised. For more information about diabetes, visit the American Diabetes Association website at www.diabetes.org or call 790-518-5800.  When to seek medical advice  Call your healthcare provider right away if you have any of these symptoms of high blood sugar:  · Frequent urination  · Dizziness  · Drowsiness  · Thirst  · Headache  · Nausea or vomiting  · Abdominal pain  · Eyesight changes  · Fast breathing  · Confusion or loss of consciousness  Also call your provider right away if you have any of these signs of low blood sugar:  · Fatigue  · Headache  · Shakes  · Excess sweating  · Hunger  · Feeling anxious or restless  · Eyesight changes  · Drowsiness  · Weakness  · Confusion or loss of consciousness  Call 911  Call for emergency help right away if any of these occur:  · Chest pain or shortness of breath  · Dizziness or  fainting  · Weakness of an arm or leg or one side of the face  · Trouble speaking or seeing   Date Last Reviewed: 6/1/2016  © 6966-7005 [employer]. [address] [place] [address], [place] [address]. All rights reserved. This information is not intended as a substitute for professional medical care. Always follow your healthcare professional's instructions.

## 2019-09-03 ENCOUNTER — OFFICE VISIT (OUTPATIENT)
Dept: PODIATRY | Facility: CLINIC | Age: 69
End: 2019-09-03
Payer: MEDICARE

## 2019-09-03 VITALS — BODY MASS INDEX: 21.25 KG/M2 | HEIGHT: 63 IN | WEIGHT: 119.94 LBS

## 2019-09-03 DIAGNOSIS — E11.49 TYPE II DIABETES MELLITUS WITH NEUROLOGICAL MANIFESTATIONS: Primary | ICD-10-CM

## 2019-09-03 DIAGNOSIS — L84 CORN OR CALLUS: ICD-10-CM

## 2019-09-03 PROCEDURE — 1101F PT FALLS ASSESS-DOCD LE1/YR: CPT | Mod: HCNC,CPTII,S$GLB, | Performed by: PODIATRIST

## 2019-09-03 PROCEDURE — 99499 RISK ADDL DX/OHS AUDIT: ICD-10-PCS | Mod: S$GLB,,, | Performed by: PODIATRIST

## 2019-09-03 PROCEDURE — 1101F PR PT FALLS ASSESS DOC 0-1 FALLS W/OUT INJ PAST YR: ICD-10-PCS | Mod: HCNC,CPTII,S$GLB, | Performed by: PODIATRIST

## 2019-09-03 PROCEDURE — 3044F PR MOST RECENT HEMOGLOBIN A1C LEVEL <7.0%: ICD-10-PCS | Mod: HCNC,CPTII,S$GLB, | Performed by: PODIATRIST

## 2019-09-03 PROCEDURE — 99499 UNLISTED E&M SERVICE: CPT | Mod: S$GLB,,, | Performed by: PODIATRIST

## 2019-09-03 PROCEDURE — 99212 OFFICE O/P EST SF 10 MIN: CPT | Mod: HCNC,S$GLB,, | Performed by: PODIATRIST

## 2019-09-03 PROCEDURE — 99212 PR OFFICE/OUTPT VISIT, EST, LEVL II, 10-19 MIN: ICD-10-PCS | Mod: HCNC,S$GLB,, | Performed by: PODIATRIST

## 2019-09-03 PROCEDURE — 99999 PR PBB SHADOW E&M-EST. PATIENT-LVL III: ICD-10-PCS | Mod: PBBFAC,HCNC,, | Performed by: PODIATRIST

## 2019-09-03 PROCEDURE — 99999 PR PBB SHADOW E&M-EST. PATIENT-LVL III: CPT | Mod: PBBFAC,HCNC,, | Performed by: PODIATRIST

## 2019-09-03 PROCEDURE — 3044F HG A1C LEVEL LT 7.0%: CPT | Mod: HCNC,CPTII,S$GLB, | Performed by: PODIATRIST

## 2019-09-03 NOTE — PROGRESS NOTES
Subjective:      Patient ID: Sukumar Tay is a 69 y.o. male.    Chief Complaint: Callouses (right great toe) and Diabetic Foot Exam (Petrona Rincon- 8/30/19)    Sukumar is a 69 y.o. male who presents to the clinic upon referral from Dr. Rincon  for evaluation and treatment of diabetic feet. Sukumar has a past medical history of Dysuria and H. pylori infection. Patient relates no major problem with feet. Only complaints today consist of callus both feet.  Gradual onset, worsening over past several weeks, aggravated by increased weight bearing, shoe gear, pressure.  No previous medical treatment.  OTC pain med not helping.      PCP: Petrona Rincon MD    Date Last Seen by PCP:   Chief Complaint   Patient presents with    Callouses     right great toe    Diabetic Foot Exam     Petrona Rincon- 8/30/19        Current shoe gear: Casual shoes    Hemoglobin A1C   Date Value Ref Range Status   06/28/2019 5.6 4.0 - 5.6 % Final     Comment:     ADA Screening Guidelines:  5.7-6.4%  Consistent with prediabetes  >or=6.5%  Consistent with diabetes  High levels of fetal hemoglobin interfere with the HbA1C  assay. Heterozygous hemoglobin variants (HbS, HgC, etc)do  not significantly interfere with this assay.   However, presence of multiple variants may affect accuracy.     01/04/2019 5.4 4.0 - 5.6 % Final     Comment:     ADA Screening Guidelines:  5.7-6.4%  Consistent with prediabetes  >or=6.5%  Consistent with diabetes  High levels of fetal hemoglobin interfere with the HbA1C  assay. Heterozygous hemoglobin variants (HbS, HgC, etc)do  not significantly interfere with this assay.   However, presence of multiple variants may affect accuracy.     06/13/2018 5.4 4.0 - 5.6 % Final     Comment:     ADA Screening Guidelines:  5.7-6.4%  Consistent with prediabetes  >or=6.5%  Consistent with diabetes  High levels of fetal hemoglobin interfere with the HbA1C  assay. Heterozygous hemoglobin variants (HbS, HgC, etc)do  not significantly interfere  with this assay.   However, presence of multiple variants may affect accuracy.             Review of Systems   Constitution: Negative for chills, diaphoresis, fever, malaise/fatigue and night sweats.   Cardiovascular: Negative for claudication, cyanosis, leg swelling and syncope.   Skin: Positive for suspicious lesions. Negative for color change, dry skin, nail changes, rash and unusual hair distribution.   Musculoskeletal: Negative for falls, joint pain, joint swelling, muscle cramps, muscle weakness and stiffness.   Gastrointestinal: Negative for constipation, diarrhea, nausea and vomiting.   Neurological: Negative for brief paralysis, disturbances in coordination, focal weakness, numbness, paresthesias, sensory change and tremors.           Objective:      Physical Exam   Constitutional: He appears well-developed and well-nourished. He is cooperative. No distress.   Cardiovascular:   Pulses:       Popliteal pulses are 2+ on the right side, and 2+ on the left side.        Dorsalis pedis pulses are 2+ on the right side, and 2+ on the left side.        Posterior tibial pulses are 2+ on the right side, and 2+ on the left side.   Capillary refill 3 seconds all toes/distal feet, all toes/both feet warm to touch.      Negative lymphadenopathy bilateral popliteal fossa and tarsal tunnel.      Negavie lower extremity edema bilateral.     Musculoskeletal:        Right ankle: Normal. He exhibits normal range of motion, no swelling, no ecchymosis, no deformity, no laceration and normal pulse. Achilles tendon normal. Achilles tendon exhibits no pain, no defect and normal Mueller's test results.   Ankle dorsiflexion decreased at <10 degrees bilateral with moderate increase with knee flexion bilateral.    Otherwise, Normal angle, base, station of gait. All ten toes without clubbing, cyanosis, or signs of ischemia.  No pain to palpation bilateral lower extremities.  Range of motion, stability, muscle strength, and muscle tone  normal bilateral feet and legs.     Lymphadenopathy: No inguinal adenopathy noted on the right or left side.   Negative lymphadenopathy bilateral popliteal fossa and tarsal tunnel.   Neurological: He is alert. He has normal strength. He displays no atrophy and no tremor. A sensory deficit is present. He exhibits normal muscle tone. He displays no seizure activity. Gait normal.   Reflex Scores:       Patellar reflexes are 2+ on the right side and 2+ on the left side.       Achilles reflexes are 2+ on the right side and 2+ on the left side.  Decreased/absent vibratory sensation bilateral feet to 128Hz tuning fork.    Negative tinel sign to percussion sural, superficial peroneal, deep peroneal, saphenous, and posterior tibial nerves right and left ankles and feet.     Skin: Skin is warm, dry and intact. No abrasion, no bruising, no burn, no ecchymosis, no laceration, no lesion and no rash noted. He is not diaphoretic. No cyanosis or erythema. No pallor. Nails show no clubbing.     Focal hyperkeratotic lesion consisting entirely of hyperkeratotic tissue without open skin, drainage, pus, fluctuance, malodor, or signs of infection plantar 1st mtpj right and medial hallux ipj left.    Otherwise, Skin is normal age and health appropriate color, turgor, texture, and temperature bilateral lower extremities without ulceration, hyperpigmentation, discoloration, masses nodules or cords palpated.  No ecchymosis, erythema, edema, or cardinal signs of infection bilateral lower extremities.               Assessment:       Encounter Diagnoses   Name Primary?    Type II diabetes mellitus with neurological manifestations Yes    Corn or callus          Plan:       Sukumar was seen today for callouses and diabetic foot exam.    Diagnoses and all orders for this visit:    Type II diabetes mellitus with neurological manifestations  -     DIABETIC SHOES FOR HOME USE    Corn or callus  -     DIABETIC SHOES FOR HOME USE      I counseled the  patient on his conditions, their implications and medical management.        - Shoe inspection. Diabetic Foot Education. Patient reminded of the importance of good nutrition and blood sugar control to help prevent podiatric complications of diabetes. Patient instructed on proper foot hygeine. We discussed wearing proper shoe gear, daily foot inspections, never walking without protective shoe gear, never putting sharp instruments to feet, routine podiatric visits at least annually.      Rx diabetic shoes, custom molded inserts,urea.    Discussed conservative treatment with shoes of adequate dimensions, material, and style to alleviate symptoms and delay or prevent surgical intervention.           Follow up in about 1 year (around 9/3/2020).

## 2019-09-03 NOTE — LETTER
September 3, 2019      Petrona Rincon MD  [address]  [place] [address]           [place] [facility]  [address]  [facility] [address]  Phone: [phone]          Patient: Sukumar Tay   MR Number: [id number]   YOB: 1950   Date of Visit: 9/3/2019       Dear Dr. Petrona Rincon:    Thank you for referring Sukumar Tay to me for evaluation. Attached you will find relevant portions of my assessment and plan of care.    If you have questions, please do not hesitate to call me. I look forward to following Sukumar Tay along with you.    Sincerely,    Niranjan Weaver, JON    Enclosure  CC:  No Recipients    If you would like to receive this communication electronically, please contact [email or web address] or [phone] to request more information on [employer] Link access.    For providers and/or their staff who would like to refer a patient to [facility], please contact us through our one-stop-shop provider referral line, [facility], at [phone].    If you feel you have received this communication in error or would no longer like to receive these types of communications, please e-mail [email or web address]

## 2019-09-16 ENCOUNTER — TELEPHONE (OUTPATIENT)
Dept: DERMATOLOGY | Facility: CLINIC | Age: 69
End: 2019-09-16

## 2019-09-16 NOTE — TELEPHONE ENCOUNTER
----- Message from Silas Nunn MD sent at 9/15/2019  8:55 AM CDT -----  Regarding: ask me please  Patrica,   Ask me please. If he doesn't want to come back to me, he should see Dr. Beatty in 2-3 months to be checked over for any other skin cancers and to recheck the area on his nose.    ----- Message -----  From: Iwona Lucero CST  Sent: 9/13/2019   1:13 PM  To: Iwona Lucero CST, Silas Nunn MD    Patient canceled his f/u efudex appointment on 10-31-19. He said he already saw you twice.

## 2019-09-16 NOTE — TELEPHONE ENCOUNTER
Spoke with patient wife, told her that Dr. Nunn wants the patient to follow up with Dr. Beatty in 2-3 months.

## 2019-09-17 LAB
LEFT EYE DM RETINOPATHY: POSITIVE
RIGHT EYE DM RETINOPATHY: POSITIVE

## 2019-09-19 ENCOUNTER — PATIENT OUTREACH (OUTPATIENT)
Dept: ADMINISTRATIVE | Facility: HOSPITAL | Age: 69
End: 2019-09-19

## 2019-11-01 DIAGNOSIS — J32.8 OTHER CHRONIC SINUSITIS: Primary | ICD-10-CM

## 2019-11-05 ENCOUNTER — HOSPITAL ENCOUNTER (OUTPATIENT)
Dept: RADIOLOGY | Facility: HOSPITAL | Age: 69
Discharge: HOME | End: 2019-11-05
Attending: OTOLARYNGOLOGY
Payer: MEDICARE

## 2019-11-05 DIAGNOSIS — J32.8 OTHER CHRONIC SINUSITIS: ICD-10-CM

## 2019-11-05 PROCEDURE — 70486 CT MAXILLOFACIAL W/O DYE: CPT | Mod: TC,PO

## 2020-05-05 ENCOUNTER — PATIENT MESSAGE (OUTPATIENT)
Dept: ADMINISTRATIVE | Facility: HOSPITAL | Age: 70
End: 2020-05-05

## 2020-08-20 ENCOUNTER — PATIENT OUTREACH (OUTPATIENT)
Dept: ADMINISTRATIVE | Facility: HOSPITAL | Age: 70
End: 2020-08-20

## 2020-08-20 ENCOUNTER — LAB VISIT (OUTPATIENT)
Dept: LAB | Facility: HOSPITAL | Age: 70
End: 2020-08-20
Attending: FAMILY MEDICINE
Payer: MEDICARE

## 2020-08-20 DIAGNOSIS — Z12.5 PROSTATE CANCER SCREENING: ICD-10-CM

## 2020-08-20 DIAGNOSIS — E78.5 HYPERLIPIDEMIA ASSOCIATED WITH TYPE 2 DIABETES MELLITUS: ICD-10-CM

## 2020-08-20 DIAGNOSIS — M10.40 OTHER SECONDARY GOUT, UNSPECIFIED CHRONICITY, UNSPECIFIED SITE: ICD-10-CM

## 2020-08-20 DIAGNOSIS — E11.69 HYPERLIPIDEMIA ASSOCIATED WITH TYPE 2 DIABETES MELLITUS: ICD-10-CM

## 2020-08-20 DIAGNOSIS — E11.9 TYPE 2 DIABETES MELLITUS WITHOUT COMPLICATION, UNSPECIFIED WHETHER LONG TERM INSULIN USE: ICD-10-CM

## 2020-08-20 LAB
ALBUMIN SERPL BCP-MCNC: 4.4 G/DL (ref 3.5–5.2)
ALP SERPL-CCNC: 55 U/L (ref 55–135)
ALT SERPL W/O P-5'-P-CCNC: 22 U/L (ref 10–44)
ANION GAP SERPL CALC-SCNC: 4 MMOL/L (ref 8–16)
AST SERPL-CCNC: 31 U/L (ref 10–40)
BASOPHILS # BLD AUTO: 0.03 K/UL (ref 0–0.2)
BASOPHILS NFR BLD: 0.6 % (ref 0–1.9)
BILIRUB SERPL-MCNC: 0.5 MG/DL (ref 0.1–1)
BUN SERPL-MCNC: 25 MG/DL (ref 8–23)
CALCIUM SERPL-MCNC: 9.4 MG/DL (ref 8.7–10.5)
CHLORIDE SERPL-SCNC: 105 MMOL/L (ref 95–110)
CHOLEST SERPL-MCNC: 147 MG/DL (ref 120–199)
CHOLEST/HDLC SERPL: 3.4 {RATIO} (ref 2–5)
CO2 SERPL-SCNC: 31 MMOL/L (ref 23–29)
COMPLEXED PSA SERPL-MCNC: 0.43 NG/ML (ref 0–4)
CREAT SERPL-MCNC: 1.1 MG/DL (ref 0.5–1.4)
DIFFERENTIAL METHOD: ABNORMAL
EOSINOPHIL # BLD AUTO: 0.1 K/UL (ref 0–0.5)
EOSINOPHIL NFR BLD: 2.4 % (ref 0–8)
ERYTHROCYTE [DISTWIDTH] IN BLOOD BY AUTOMATED COUNT: 12.4 % (ref 11.5–14.5)
EST. GFR  (AFRICAN AMERICAN): >60 ML/MIN/1.73 M^2
EST. GFR  (NON AFRICAN AMERICAN): >60 ML/MIN/1.73 M^2
ESTIMATED AVG GLUCOSE: 114 MG/DL (ref 68–131)
GLUCOSE SERPL-MCNC: 109 MG/DL (ref 70–110)
HBA1C MFR BLD HPLC: 5.6 % (ref 4–5.6)
HCT VFR BLD AUTO: 43 % (ref 40–54)
HDLC SERPL-MCNC: 43 MG/DL (ref 40–75)
HDLC SERPL: 29.3 % (ref 20–50)
HGB BLD-MCNC: 13.6 G/DL (ref 14–18)
IMM GRANULOCYTES # BLD AUTO: 0.01 K/UL (ref 0–0.04)
IMM GRANULOCYTES NFR BLD AUTO: 0.2 % (ref 0–0.5)
LDLC SERPL CALC-MCNC: 92.6 MG/DL (ref 63–159)
LYMPHOCYTES # BLD AUTO: 1.6 K/UL (ref 1–4.8)
LYMPHOCYTES NFR BLD: 32.9 % (ref 18–48)
MCH RBC QN AUTO: 31.7 PG (ref 27–31)
MCHC RBC AUTO-ENTMCNC: 31.6 G/DL (ref 32–36)
MCV RBC AUTO: 100 FL (ref 82–98)
MONOCYTES # BLD AUTO: 0.4 K/UL (ref 0.3–1)
MONOCYTES NFR BLD: 8.5 % (ref 4–15)
NEUTROPHILS # BLD AUTO: 2.7 K/UL (ref 1.8–7.7)
NEUTROPHILS NFR BLD: 55.4 % (ref 38–73)
NONHDLC SERPL-MCNC: 104 MG/DL
NRBC BLD-RTO: 0 /100 WBC
PLATELET # BLD AUTO: 210 K/UL (ref 150–350)
PMV BLD AUTO: 9 FL (ref 9.2–12.9)
POTASSIUM SERPL-SCNC: 4.9 MMOL/L (ref 3.5–5.1)
PROT SERPL-MCNC: 7.7 G/DL (ref 6–8.4)
RBC # BLD AUTO: 4.29 M/UL (ref 4.6–6.2)
SODIUM SERPL-SCNC: 140 MMOL/L (ref 136–145)
TRIGL SERPL-MCNC: 57 MG/DL (ref 30–150)
URATE SERPL-MCNC: 6.7 MG/DL (ref 3.4–7)
WBC # BLD AUTO: 4.93 K/UL (ref 3.9–12.7)

## 2020-08-20 PROCEDURE — 85025 COMPLETE CBC W/AUTO DIFF WBC: CPT | Mod: HCNC

## 2020-08-20 PROCEDURE — 80053 COMPREHEN METABOLIC PANEL: CPT | Mod: HCNC

## 2020-08-20 PROCEDURE — 84153 ASSAY OF PSA TOTAL: CPT | Mod: HCNC

## 2020-08-20 PROCEDURE — 83036 HEMOGLOBIN GLYCOSYLATED A1C: CPT | Mod: HCNC

## 2020-08-20 PROCEDURE — 80061 LIPID PANEL: CPT | Mod: HCNC

## 2020-08-20 PROCEDURE — 36415 COLL VENOUS BLD VENIPUNCTURE: CPT | Mod: HCNC,PO

## 2020-08-20 PROCEDURE — 84550 ASSAY OF BLOOD/URIC ACID: CPT | Mod: HCNC

## 2020-08-20 NOTE — LETTER
AUTHORIZATION FOR RELEASE OF   CONFIDENTIAL INFORMATION    Dear DR. COELLO,    We are seeing Sukumar Tay, date of birth 1950, in the clinic at [facility]. Petrona Rincon MD is the patient's PCP. Sukumar Tay has an outstanding lab/procedure at the time we reviewed his chart. In order to help keep his health information updated, he has authorized us to request the following medical record(s):        (  )  MAMMOGRAM                                      (  )  COLONOSCOPY      (  )  PAP SMEAR                                          (  )  OUTSIDE LAB RESULTS     (  )  DEXA SCAN                                          ( X )  EYE EXAM            (  )  FOOT EXAM                                          (  )  ENTIRE RECORD     (  )  OUTSIDE IMMUNIZATIONS                 (  )  _______________         Please fax records to [facility], Amy L Hammons, MD, [phone]    Roula Wolf LPN  Clinical Care Coordinator  [address] [address]  P: [phone]  F: [phone]            Patient Name: Sukumar Tay  : 1950  Patient Phone #: [phone]

## 2020-08-20 NOTE — PROGRESS NOTES
Chart review completed 2020.  Care Everywhere updates requested and reviewed.  Immunizations reconciled. Media reports reviewed.  Duplicate HM overrides and  orders removed.  Overdue HM topic chart audit and/or requested.  Overdue lab testing linked to upcoming lab appointments if applies.    LABS COMPLETED TODAY, .  Requested EYE EXAM  records     Health Maintenance Due   Topic Date Due    TETANUS VACCINE  1969    Shingles Vaccine (1 of 2) 2001    Pneumococcal Vaccine (65+ High/Highest Risk) (1 of 2 - PCV13) 2016    Hemoglobin A1c  2019    Lipid Panel  2020    Foot Exam  2020    Eye Exam  2020

## 2020-09-03 ENCOUNTER — OFFICE VISIT (OUTPATIENT)
Dept: FAMILY MEDICINE | Facility: CLINIC | Age: 70
End: 2020-09-03
Payer: MEDICARE

## 2020-09-03 VITALS
HEART RATE: 67 BPM | TEMPERATURE: 98 F | DIASTOLIC BLOOD PRESSURE: 60 MMHG | SYSTOLIC BLOOD PRESSURE: 100 MMHG | BODY MASS INDEX: 21.21 KG/M2 | HEIGHT: 63 IN | OXYGEN SATURATION: 98 % | RESPIRATION RATE: 16 BRPM | WEIGHT: 119.69 LBS

## 2020-09-03 DIAGNOSIS — E11.319 TYPE 2 DIABETES MELLITUS WITH RETINOPATHY, WITHOUT LONG-TERM CURRENT USE OF INSULIN, MACULAR EDEMA PRESENCE UNSPECIFIED, UNSPECIFIED LATERALITY, UNSPECIFIED RETINOPATHY SEVERITY: Primary | ICD-10-CM

## 2020-09-03 DIAGNOSIS — D53.9 MACROCYTIC ANEMIA: ICD-10-CM

## 2020-09-03 DIAGNOSIS — E11.69 HYPERLIPIDEMIA ASSOCIATED WITH TYPE 2 DIABETES MELLITUS: ICD-10-CM

## 2020-09-03 DIAGNOSIS — M10.40 OTHER SECONDARY GOUT, UNSPECIFIED CHRONICITY, UNSPECIFIED SITE: ICD-10-CM

## 2020-09-03 DIAGNOSIS — E78.5 HYPERLIPIDEMIA ASSOCIATED WITH TYPE 2 DIABETES MELLITUS: ICD-10-CM

## 2020-09-03 DIAGNOSIS — Z23 NEED FOR PNEUMOCOCCAL VACCINATION: ICD-10-CM

## 2020-09-03 DIAGNOSIS — H61.22 IMPACTED CERUMEN OF LEFT EAR: ICD-10-CM

## 2020-09-03 PROCEDURE — 3044F PR MOST RECENT HEMOGLOBIN A1C LEVEL <7.0%: ICD-10-PCS | Mod: HCNC,CPTII,S$GLB, | Performed by: FAMILY MEDICINE

## 2020-09-03 PROCEDURE — 1159F PR MEDICATION LIST DOCUMENTED IN MEDICAL RECORD: ICD-10-PCS | Mod: HCNC,S$GLB,, | Performed by: FAMILY MEDICINE

## 2020-09-03 PROCEDURE — 90670 PCV13 VACCINE IM: CPT | Mod: HCNC,S$GLB,, | Performed by: FAMILY MEDICINE

## 2020-09-03 PROCEDURE — 99499 RISK ADDL DX/OHS AUDIT: ICD-10-PCS | Mod: S$GLB,,, | Performed by: FAMILY MEDICINE

## 2020-09-03 PROCEDURE — 69209 REMOVE IMPACTED EAR WAX UNI: CPT | Mod: HCNC,LT,S$GLB, | Performed by: FAMILY MEDICINE

## 2020-09-03 PROCEDURE — 99499 UNLISTED E&M SERVICE: CPT | Mod: S$GLB,,, | Performed by: FAMILY MEDICINE

## 2020-09-03 PROCEDURE — 1159F MED LIST DOCD IN RCRD: CPT | Mod: HCNC,S$GLB,, | Performed by: FAMILY MEDICINE

## 2020-09-03 PROCEDURE — 1101F PT FALLS ASSESS-DOCD LE1/YR: CPT | Mod: HCNC,CPTII,S$GLB, | Performed by: FAMILY MEDICINE

## 2020-09-03 PROCEDURE — 99214 OFFICE O/P EST MOD 30 MIN: CPT | Mod: 25,HCNC,S$GLB, | Performed by: FAMILY MEDICINE

## 2020-09-03 PROCEDURE — G0009 ADMIN PNEUMOCOCCAL VACCINE: HCPCS | Mod: HCNC,S$GLB,, | Performed by: FAMILY MEDICINE

## 2020-09-03 PROCEDURE — 99214 PR OFFICE/OUTPT VISIT, EST, LEVL IV, 30-39 MIN: ICD-10-PCS | Mod: 25,HCNC,S$GLB, | Performed by: FAMILY MEDICINE

## 2020-09-03 PROCEDURE — 90670 PNEUMOCOCCAL CONJUGATE VACCINE 13-VALENT LESS THAN 5YO & GREATER THAN: ICD-10-PCS | Mod: HCNC,S$GLB,, | Performed by: FAMILY MEDICINE

## 2020-09-03 PROCEDURE — 1126F PR PAIN SEVERITY QUANTIFIED, NO PAIN PRESENT: ICD-10-PCS | Mod: HCNC,S$GLB,, | Performed by: FAMILY MEDICINE

## 2020-09-03 PROCEDURE — 3044F HG A1C LEVEL LT 7.0%: CPT | Mod: HCNC,CPTII,S$GLB, | Performed by: FAMILY MEDICINE

## 2020-09-03 PROCEDURE — 3008F PR BODY MASS INDEX (BMI) DOCUMENTED: ICD-10-PCS | Mod: HCNC,CPTII,S$GLB, | Performed by: FAMILY MEDICINE

## 2020-09-03 PROCEDURE — 69209 EAR CERUMEN REMOVAL: ICD-10-PCS | Mod: HCNC,LT,S$GLB, | Performed by: FAMILY MEDICINE

## 2020-09-03 PROCEDURE — 1126F AMNT PAIN NOTED NONE PRSNT: CPT | Mod: HCNC,S$GLB,, | Performed by: FAMILY MEDICINE

## 2020-09-03 PROCEDURE — 99999 PR PBB SHADOW E&M-EST. PATIENT-LVL III: CPT | Mod: PBBFAC,HCNC,, | Performed by: FAMILY MEDICINE

## 2020-09-03 PROCEDURE — 1101F PR PT FALLS ASSESS DOC 0-1 FALLS W/OUT INJ PAST YR: ICD-10-PCS | Mod: HCNC,CPTII,S$GLB, | Performed by: FAMILY MEDICINE

## 2020-09-03 PROCEDURE — G0009 PNEUMOCOCCAL CONJUGATE VACCINE 13-VALENT LESS THAN 5YO & GREATER THAN: ICD-10-PCS | Mod: HCNC,S$GLB,, | Performed by: FAMILY MEDICINE

## 2020-09-03 PROCEDURE — 99999 PR PBB SHADOW E&M-EST. PATIENT-LVL III: ICD-10-PCS | Mod: PBBFAC,HCNC,, | Performed by: FAMILY MEDICINE

## 2020-09-03 PROCEDURE — 3008F BODY MASS INDEX DOCD: CPT | Mod: HCNC,CPTII,S$GLB, | Performed by: FAMILY MEDICINE

## 2020-09-03 NOTE — PROGRESS NOTES
Subjective:       Patient ID: Sukumar Tay is a 70 y.o. male.    Chief Complaint: Annual Exam    HPI  Review of Systems   Constitutional: Negative for fatigue and unexpected weight change.   HENT: Negative for congestion, ear discharge, ear pain, facial swelling, hearing loss, postnasal drip, rhinorrhea, sinus pressure and sinus pain.    Respiratory: Negative for chest tightness and shortness of breath.    Cardiovascular: Negative for chest pain, palpitations and leg swelling.   Gastrointestinal: Negative for abdominal pain.   Musculoskeletal: Negative for arthralgias.   Neurological: Negative for dizziness, syncope, light-headedness and headaches.       Patient Active Problem List   Diagnosis    Diabetes mellitus    Gout    Hyperlipidemia associated with type 2 diabetes mellitus    Macrocytic anemia     Patient is here for a chronic conditions follow up.    Reviewed labs 8/2020     Type 2 dm with retinopathy. Eye care 2020 follows -next appt Dr. Santoyo 10/2020 and Dr. Stout 11/2020.  A1c 5.6. lipids at goal on pravachol 40. On asa 81mg. On ace I.      C/o itchy ears .        Objective:      Physical Exam  Vitals signs and nursing note reviewed.   Constitutional:       Appearance: He is well-developed.   HENT:      Head: Normocephalic and atraumatic.      Right Ear: Tympanic membrane and ear canal normal.      Left Ear: Tympanic membrane normal. There is impacted cerumen.      Nose: Nose normal.   Cardiovascular:      Rate and Rhythm: Normal rate and regular rhythm.      Heart sounds: Normal heart sounds.   Pulmonary:      Effort: Pulmonary effort is normal.      Breath sounds: Normal breath sounds.   Skin:     General: Skin is warm and dry.   Neurological:      Mental Status: He is alert and oriented to person, place, and time.         Assessment:       1. Type 2 diabetes mellitus with retinopathy, without long-term current use of insulin, macular edema presence unspecified, unspecified laterality,  unspecified retinopathy severity    2. Other secondary gout, unspecified chronicity, unspecified site    3. Hyperlipidemia associated with type 2 diabetes mellitus    4. Need for pneumococcal vaccination    5. Macrocytic anemia    6. Impacted cerumen of left ear        Plan:         1. Type 2 diabetes mellitus with retinopathy, without long-term current use of insulin, macular edema presence unspecified, unspecified laterality, unspecified retinopathy severity  Controlled on current medications.  Continue current medications.    - Hemoglobin A1C; Future  - Microalbumin/creatinine urine ratio; Future    2. Other secondary gout, unspecified chronicity, unspecified site  Controlled on current medications.  Continue current medications.      3. Hyperlipidemia associated with type 2 diabetes mellitus  Controlled on current medications.  Continue current medications.    - Comprehensive metabolic panel; Future  - Lipid Panel; Future    4. Need for pneumococcal vaccination  declined    5. Macrocytic anemia  Screen and treat as indicated:    - CBC auto differential; Future  - Vitamin B12; Future  - Folate; Future    6. Impacted cerumen of left ear  S/p   - Ear wax removal        Time spent with patient: 20 minutes    Patient with be reevaluated in 1 year or sooner prn    Greater than 50% of this visit was spent counseling as described in above documentation:Yes

## 2020-09-03 NOTE — PROGRESS NOTES
2 patient identifiers used (name and ). Administered Prevnar 13 vaccine IM. Patient tolerated well, no bleeding at insertion site noted. Pain rated as 0 on scale of 0/10. Aseptic technique maintained. Immunization information given to patient.

## 2020-09-14 NOTE — PROCEDURES
"Ear Cerumen Removal    Date/Time: 9/3/2020 1:20 PM  Performed by: Petrona Rincon MD  Authorized by: Petrona Rincon MD     Time out: Immediately prior to procedure a "time out" was called to verify the correct patient, procedure, equipment, support staff and site/side marked as required.    Consent Done?:  Yes (Verbal)  Location details:  Left ear  Procedure type: irrigation    Cerumen  Removal Results:  Cerumen partially removed  Patient tolerance:  Patient tolerated the procedure well with no immediate complications      "

## 2020-09-22 LAB
LEFT EYE DM RETINOPATHY: POSITIVE
RIGHT EYE DM RETINOPATHY: POSITIVE

## 2020-09-29 ENCOUNTER — PATIENT MESSAGE (OUTPATIENT)
Dept: OTHER | Facility: OTHER | Age: 70
End: 2020-09-29

## 2020-10-05 ENCOUNTER — PATIENT OUTREACH (OUTPATIENT)
Dept: ADMINISTRATIVE | Facility: HOSPITAL | Age: 70
End: 2020-10-05

## 2021-02-12 ENCOUNTER — IMMUNIZATION (OUTPATIENT)
Dept: PRIMARY CARE CLINIC | Facility: CLINIC | Age: 71
End: 2021-02-12
Payer: MEDICARE

## 2021-02-12 DIAGNOSIS — Z23 NEED FOR VACCINATION: Primary | ICD-10-CM

## 2021-02-12 PROCEDURE — 0001A COVID-19, MRNA, LNP-S, PF, 30 MCG/0.3 ML DOSE VACCINE: ICD-10-PCS | Mod: S$GLB,,, | Performed by: FAMILY MEDICINE

## 2021-02-12 PROCEDURE — 91300 COVID-19, MRNA, LNP-S, PF, 30 MCG/0.3 ML DOSE VACCINE: CPT | Mod: S$GLB,,, | Performed by: FAMILY MEDICINE

## 2021-02-12 PROCEDURE — 0001A COVID-19, MRNA, LNP-S, PF, 30 MCG/0.3 ML DOSE VACCINE: CPT | Mod: S$GLB,,, | Performed by: FAMILY MEDICINE

## 2021-02-12 PROCEDURE — 91300 COVID-19, MRNA, LNP-S, PF, 30 MCG/0.3 ML DOSE VACCINE: ICD-10-PCS | Mod: S$GLB,,, | Performed by: FAMILY MEDICINE

## 2021-03-02 LAB
LEFT EYE DM RETINOPATHY: POSITIVE
RIGHT EYE DM RETINOPATHY: POSITIVE

## 2021-03-05 ENCOUNTER — PATIENT OUTREACH (OUTPATIENT)
Dept: ADMINISTRATIVE | Facility: HOSPITAL | Age: 71
End: 2021-03-05

## 2021-03-05 ENCOUNTER — IMMUNIZATION (OUTPATIENT)
Dept: PRIMARY CARE CLINIC | Facility: CLINIC | Age: 71
End: 2021-03-05
Payer: MEDICARE

## 2021-03-05 DIAGNOSIS — Z23 NEED FOR VACCINATION: Primary | ICD-10-CM

## 2021-03-05 PROCEDURE — 91300 COVID-19, MRNA, LNP-S, PF, 30 MCG/0.3 ML DOSE VACCINE: CPT | Mod: S$GLB,,, | Performed by: FAMILY MEDICINE

## 2021-03-05 PROCEDURE — 0002A COVID-19, MRNA, LNP-S, PF, 30 MCG/0.3 ML DOSE VACCINE: CPT | Mod: CV19,S$GLB,, | Performed by: FAMILY MEDICINE

## 2021-03-05 PROCEDURE — 0002A COVID-19, MRNA, LNP-S, PF, 30 MCG/0.3 ML DOSE VACCINE: ICD-10-PCS | Mod: CV19,S$GLB,, | Performed by: FAMILY MEDICINE

## 2021-03-05 PROCEDURE — 91300 COVID-19, MRNA, LNP-S, PF, 30 MCG/0.3 ML DOSE VACCINE: ICD-10-PCS | Mod: S$GLB,,, | Performed by: FAMILY MEDICINE

## 2021-04-05 ENCOUNTER — PATIENT MESSAGE (OUTPATIENT)
Dept: ADMINISTRATIVE | Facility: HOSPITAL | Age: 71
End: 2021-04-05

## 2021-05-17 ENCOUNTER — LAB VISIT (OUTPATIENT)
Dept: LAB | Facility: HOSPITAL | Age: 71
End: 2021-05-17
Attending: NURSE PRACTITIONER
Payer: MEDICARE

## 2021-05-17 ENCOUNTER — OFFICE VISIT (OUTPATIENT)
Dept: FAMILY MEDICINE | Facility: CLINIC | Age: 71
End: 2021-05-17
Payer: MEDICARE

## 2021-05-17 VITALS
HEART RATE: 57 BPM | SYSTOLIC BLOOD PRESSURE: 104 MMHG | HEIGHT: 63 IN | BODY MASS INDEX: 21.36 KG/M2 | OXYGEN SATURATION: 98 % | WEIGHT: 120.56 LBS | TEMPERATURE: 98 F | DIASTOLIC BLOOD PRESSURE: 54 MMHG

## 2021-05-17 DIAGNOSIS — Z01.818 PREOPERATIVE CLEARANCE: ICD-10-CM

## 2021-05-17 DIAGNOSIS — D53.9 MACROCYTIC ANEMIA: ICD-10-CM

## 2021-05-17 DIAGNOSIS — E11.319 TYPE 2 DIABETES MELLITUS WITH RETINOPATHY, WITHOUT LONG-TERM CURRENT USE OF INSULIN, MACULAR EDEMA PRESENCE UNSPECIFIED, UNSPECIFIED LATERALITY, UNSPECIFIED RETINOPATHY SEVERITY: ICD-10-CM

## 2021-05-17 DIAGNOSIS — Z01.818 PREOPERATIVE CLEARANCE: Primary | ICD-10-CM

## 2021-05-17 DIAGNOSIS — H26.9 CATARACT, UNSPECIFIED CATARACT TYPE, UNSPECIFIED LATERALITY: ICD-10-CM

## 2021-05-17 LAB
BASOPHILS # BLD AUTO: 0.03 K/UL (ref 0–0.2)
BASOPHILS NFR BLD: 0.5 % (ref 0–1.9)
DIFFERENTIAL METHOD: ABNORMAL
EOSINOPHIL # BLD AUTO: 0.2 K/UL (ref 0–0.5)
EOSINOPHIL NFR BLD: 3.2 % (ref 0–8)
ERYTHROCYTE [DISTWIDTH] IN BLOOD BY AUTOMATED COUNT: 12.8 % (ref 11.5–14.5)
HCT VFR BLD AUTO: 38.6 % (ref 40–54)
HGB BLD-MCNC: 12.6 G/DL (ref 14–18)
IMM GRANULOCYTES # BLD AUTO: 0.01 K/UL (ref 0–0.04)
IMM GRANULOCYTES NFR BLD AUTO: 0.2 % (ref 0–0.5)
LYMPHOCYTES # BLD AUTO: 1.6 K/UL (ref 1–4.8)
LYMPHOCYTES NFR BLD: 28.5 % (ref 18–48)
MCH RBC QN AUTO: 31.3 PG (ref 27–31)
MCHC RBC AUTO-ENTMCNC: 32.6 G/DL (ref 32–36)
MCV RBC AUTO: 96 FL (ref 82–98)
MONOCYTES # BLD AUTO: 0.4 K/UL (ref 0.3–1)
MONOCYTES NFR BLD: 7.7 % (ref 4–15)
NEUTROPHILS # BLD AUTO: 3.3 K/UL (ref 1.8–7.7)
NEUTROPHILS NFR BLD: 59.9 % (ref 38–73)
NRBC BLD-RTO: 0 /100 WBC
PLATELET # BLD AUTO: 192 K/UL (ref 150–450)
PMV BLD AUTO: 9.3 FL (ref 9.2–12.9)
RBC # BLD AUTO: 4.03 M/UL (ref 4.6–6.2)
WBC # BLD AUTO: 5.58 K/UL (ref 3.9–12.7)

## 2021-05-17 PROCEDURE — 93005 EKG 12-LEAD: ICD-10-PCS | Mod: S$GLB,,, | Performed by: NURSE PRACTITIONER

## 2021-05-17 PROCEDURE — 1170F PR FUNCTIONAL STATUS ASSESSED: ICD-10-PCS | Mod: S$GLB,,, | Performed by: NURSE PRACTITIONER

## 2021-05-17 PROCEDURE — 1126F AMNT PAIN NOTED NONE PRSNT: CPT | Mod: S$GLB,,, | Performed by: NURSE PRACTITIONER

## 2021-05-17 PROCEDURE — 93005 ELECTROCARDIOGRAM TRACING: CPT | Mod: S$GLB,,, | Performed by: NURSE PRACTITIONER

## 2021-05-17 PROCEDURE — 99214 PR OFFICE/OUTPT VISIT, EST, LEVL IV, 30-39 MIN: ICD-10-PCS | Mod: S$GLB,,, | Performed by: NURSE PRACTITIONER

## 2021-05-17 PROCEDURE — 99999 PR PBB SHADOW E&M-EST. PATIENT-LVL IV: ICD-10-PCS | Mod: PBBFAC,,, | Performed by: NURSE PRACTITIONER

## 2021-05-17 PROCEDURE — 80048 BASIC METABOLIC PNL TOTAL CA: CPT | Performed by: NURSE PRACTITIONER

## 2021-05-17 PROCEDURE — 3288F FALL RISK ASSESSMENT DOCD: CPT | Mod: CPTII,S$GLB,, | Performed by: NURSE PRACTITIONER

## 2021-05-17 PROCEDURE — 99214 OFFICE O/P EST MOD 30 MIN: CPT | Mod: S$GLB,,, | Performed by: NURSE PRACTITIONER

## 2021-05-17 PROCEDURE — 99999 PR PBB SHADOW E&M-EST. PATIENT-LVL IV: CPT | Mod: PBBFAC,,, | Performed by: NURSE PRACTITIONER

## 2021-05-17 PROCEDURE — 3008F PR BODY MASS INDEX (BMI) DOCUMENTED: ICD-10-PCS | Mod: CPTII,S$GLB,, | Performed by: NURSE PRACTITIONER

## 2021-05-17 PROCEDURE — 36415 COLL VENOUS BLD VENIPUNCTURE: CPT | Mod: PO | Performed by: NURSE PRACTITIONER

## 2021-05-17 PROCEDURE — 93010 EKG 12-LEAD: ICD-10-PCS | Mod: S$GLB,ICN,, | Performed by: INTERNAL MEDICINE

## 2021-05-17 PROCEDURE — 1170F FXNL STATUS ASSESSED: CPT | Mod: S$GLB,,, | Performed by: NURSE PRACTITIONER

## 2021-05-17 PROCEDURE — 1101F PR PT FALLS ASSESS DOC 0-1 FALLS W/OUT INJ PAST YR: ICD-10-PCS | Mod: CPTII,S$GLB,, | Performed by: NURSE PRACTITIONER

## 2021-05-17 PROCEDURE — 1101F PT FALLS ASSESS-DOCD LE1/YR: CPT | Mod: CPTII,S$GLB,, | Performed by: NURSE PRACTITIONER

## 2021-05-17 PROCEDURE — 85025 COMPLETE CBC W/AUTO DIFF WBC: CPT | Performed by: NURSE PRACTITIONER

## 2021-05-17 PROCEDURE — 3288F PR FALLS RISK ASSESSMENT DOCUMENTED: ICD-10-PCS | Mod: CPTII,S$GLB,, | Performed by: NURSE PRACTITIONER

## 2021-05-17 PROCEDURE — 3008F BODY MASS INDEX DOCD: CPT | Mod: CPTII,S$GLB,, | Performed by: NURSE PRACTITIONER

## 2021-05-17 PROCEDURE — 93010 ELECTROCARDIOGRAM REPORT: CPT | Mod: S$GLB,ICN,, | Performed by: INTERNAL MEDICINE

## 2021-05-17 PROCEDURE — 1159F MED LIST DOCD IN RCRD: CPT | Mod: S$GLB,,, | Performed by: NURSE PRACTITIONER

## 2021-05-17 PROCEDURE — 1159F PR MEDICATION LIST DOCUMENTED IN MEDICAL RECORD: ICD-10-PCS | Mod: S$GLB,,, | Performed by: NURSE PRACTITIONER

## 2021-05-17 PROCEDURE — 1126F PR PAIN SEVERITY QUANTIFIED, NO PAIN PRESENT: ICD-10-PCS | Mod: S$GLB,,, | Performed by: NURSE PRACTITIONER

## 2021-05-18 LAB
ANION GAP SERPL CALC-SCNC: 13 MMOL/L (ref 8–16)
BUN SERPL-MCNC: 32 MG/DL (ref 8–23)
CALCIUM SERPL-MCNC: 9.8 MG/DL (ref 8.7–10.5)
CHLORIDE SERPL-SCNC: 103 MMOL/L (ref 95–110)
CO2 SERPL-SCNC: 24 MMOL/L (ref 23–29)
CREAT SERPL-MCNC: 1 MG/DL (ref 0.5–1.4)
EST. GFR  (AFRICAN AMERICAN): >60 ML/MIN/1.73 M^2
EST. GFR  (NON AFRICAN AMERICAN): >60 ML/MIN/1.73 M^2
GLUCOSE SERPL-MCNC: 75 MG/DL (ref 70–110)
POTASSIUM SERPL-SCNC: 4.9 MMOL/L (ref 3.5–5.1)
SODIUM SERPL-SCNC: 140 MMOL/L (ref 136–145)

## 2021-05-20 ENCOUNTER — LAB VISIT (OUTPATIENT)
Dept: LAB | Facility: HOSPITAL | Age: 71
End: 2021-05-20
Attending: NURSE PRACTITIONER
Payer: MEDICARE

## 2021-05-20 DIAGNOSIS — D51.8 OTHER VITAMIN B12 DEFICIENCY ANEMIAS: ICD-10-CM

## 2021-05-20 DIAGNOSIS — D64.9 ANEMIA, UNSPECIFIED TYPE: ICD-10-CM

## 2021-05-20 DIAGNOSIS — R79.9 ELEVATED BUN: ICD-10-CM

## 2021-05-20 DIAGNOSIS — D53.9 MACROCYTIC ANEMIA: Primary | ICD-10-CM

## 2021-05-20 LAB
RETICS/RBC NFR AUTO: 1.4 % (ref 0.4–2)
VIT B12 SERPL-MCNC: 380 PG/ML (ref 210–950)

## 2021-05-20 PROCEDURE — 80048 BASIC METABOLIC PNL TOTAL CA: CPT | Performed by: NURSE PRACTITIONER

## 2021-05-20 PROCEDURE — 82607 VITAMIN B-12: CPT | Performed by: NURSE PRACTITIONER

## 2021-05-20 PROCEDURE — 83540 ASSAY OF IRON: CPT | Performed by: NURSE PRACTITIONER

## 2021-05-20 PROCEDURE — 82728 ASSAY OF FERRITIN: CPT | Performed by: NURSE PRACTITIONER

## 2021-05-20 PROCEDURE — 36415 COLL VENOUS BLD VENIPUNCTURE: CPT | Mod: PO | Performed by: NURSE PRACTITIONER

## 2021-05-20 PROCEDURE — 85045 AUTOMATED RETICULOCYTE COUNT: CPT | Performed by: NURSE PRACTITIONER

## 2021-05-21 ENCOUNTER — TELEPHONE (OUTPATIENT)
Dept: FAMILY MEDICINE | Facility: CLINIC | Age: 71
End: 2021-05-21

## 2021-05-21 LAB
ANION GAP SERPL CALC-SCNC: 9 MMOL/L (ref 8–16)
BUN SERPL-MCNC: 28 MG/DL (ref 8–23)
CALCIUM SERPL-MCNC: 9.7 MG/DL (ref 8.7–10.5)
CHLORIDE SERPL-SCNC: 100 MMOL/L (ref 95–110)
CO2 SERPL-SCNC: 28 MMOL/L (ref 23–29)
CREAT SERPL-MCNC: 1 MG/DL (ref 0.5–1.4)
EST. GFR  (AFRICAN AMERICAN): >60 ML/MIN/1.73 M^2
EST. GFR  (NON AFRICAN AMERICAN): >60 ML/MIN/1.73 M^2
FERRITIN SERPL-MCNC: 137 NG/ML (ref 20–300)
GLUCOSE SERPL-MCNC: 99 MG/DL (ref 70–110)
IRON SERPL-MCNC: 84 UG/DL (ref 45–160)
IRON SERPL-MCNC: 84 UG/DL (ref 45–160)
POTASSIUM SERPL-SCNC: 4.3 MMOL/L (ref 3.5–5.1)
SATURATED IRON: 21 % (ref 20–50)
SODIUM SERPL-SCNC: 137 MMOL/L (ref 136–145)
TOTAL IRON BINDING CAPACITY: 401 UG/DL (ref 250–450)
TRANSFERRIN SERPL-MCNC: 271 MG/DL (ref 200–375)

## 2021-07-06 ENCOUNTER — PES CALL (OUTPATIENT)
Dept: ADMINISTRATIVE | Facility: CLINIC | Age: 71
End: 2021-07-06

## 2021-07-07 ENCOUNTER — PATIENT MESSAGE (OUTPATIENT)
Dept: ADMINISTRATIVE | Facility: HOSPITAL | Age: 71
End: 2021-07-07

## 2021-08-04 ENCOUNTER — PATIENT MESSAGE (OUTPATIENT)
Dept: ADMINISTRATIVE | Facility: HOSPITAL | Age: 71
End: 2021-08-04

## 2021-09-02 ENCOUNTER — LAB VISIT (OUTPATIENT)
Dept: LAB | Facility: HOSPITAL | Age: 71
End: 2021-09-02
Attending: FAMILY MEDICINE
Payer: MEDICARE

## 2021-09-02 DIAGNOSIS — E11.69 HYPERLIPIDEMIA ASSOCIATED WITH TYPE 2 DIABETES MELLITUS: ICD-10-CM

## 2021-09-02 DIAGNOSIS — E78.5 HYPERLIPIDEMIA ASSOCIATED WITH TYPE 2 DIABETES MELLITUS: ICD-10-CM

## 2021-09-02 DIAGNOSIS — D53.9 MACROCYTIC ANEMIA: ICD-10-CM

## 2021-09-02 DIAGNOSIS — E11.319 TYPE 2 DIABETES MELLITUS WITH RETINOPATHY, WITHOUT LONG-TERM CURRENT USE OF INSULIN, MACULAR EDEMA PRESENCE UNSPECIFIED, UNSPECIFIED LATERALITY, UNSPECIFIED RETINOPATHY SEVERITY: ICD-10-CM

## 2021-09-02 LAB
ALBUMIN SERPL BCP-MCNC: 4.2 G/DL (ref 3.5–5.2)
ALP SERPL-CCNC: 46 U/L (ref 55–135)
ALT SERPL W/O P-5'-P-CCNC: 19 U/L (ref 10–44)
ANION GAP SERPL CALC-SCNC: 11 MMOL/L (ref 8–16)
AST SERPL-CCNC: 34 U/L (ref 10–40)
BASOPHILS # BLD AUTO: 0.02 K/UL (ref 0–0.2)
BASOPHILS NFR BLD: 0.3 % (ref 0–1.9)
BILIRUB SERPL-MCNC: 0.9 MG/DL (ref 0.1–1)
BUN SERPL-MCNC: 42 MG/DL (ref 8–23)
CALCIUM SERPL-MCNC: 9.5 MG/DL (ref 8.7–10.5)
CHLORIDE SERPL-SCNC: 105 MMOL/L (ref 95–110)
CHOLEST SERPL-MCNC: 134 MG/DL (ref 120–199)
CHOLEST/HDLC SERPL: 3 {RATIO} (ref 2–5)
CO2 SERPL-SCNC: 24 MMOL/L (ref 23–29)
CREAT SERPL-MCNC: 1.4 MG/DL (ref 0.5–1.4)
DIFFERENTIAL METHOD: ABNORMAL
EOSINOPHIL # BLD AUTO: 0.2 K/UL (ref 0–0.5)
EOSINOPHIL NFR BLD: 2.7 % (ref 0–8)
ERYTHROCYTE [DISTWIDTH] IN BLOOD BY AUTOMATED COUNT: 12.3 % (ref 11.5–14.5)
EST. GFR  (AFRICAN AMERICAN): 58.4 ML/MIN/1.73 M^2
EST. GFR  (NON AFRICAN AMERICAN): 50.5 ML/MIN/1.73 M^2
ESTIMATED AVG GLUCOSE: 114 MG/DL (ref 68–131)
FOLATE SERPL-MCNC: 12.7 NG/ML (ref 4–24)
GLUCOSE SERPL-MCNC: 88 MG/DL (ref 70–110)
HBA1C MFR BLD: 5.6 % (ref 4–5.6)
HCT VFR BLD AUTO: 37.9 % (ref 40–54)
HDLC SERPL-MCNC: 44 MG/DL (ref 40–75)
HDLC SERPL: 32.8 % (ref 20–50)
HGB BLD-MCNC: 12.2 G/DL (ref 14–18)
IMM GRANULOCYTES # BLD AUTO: 0.01 K/UL (ref 0–0.04)
IMM GRANULOCYTES NFR BLD AUTO: 0.2 % (ref 0–0.5)
LDLC SERPL CALC-MCNC: 81.4 MG/DL (ref 63–159)
LYMPHOCYTES # BLD AUTO: 1.8 K/UL (ref 1–4.8)
LYMPHOCYTES NFR BLD: 29.7 % (ref 18–48)
MCH RBC QN AUTO: 30.7 PG (ref 27–31)
MCHC RBC AUTO-ENTMCNC: 32.2 G/DL (ref 32–36)
MCV RBC AUTO: 96 FL (ref 82–98)
MONOCYTES # BLD AUTO: 0.6 K/UL (ref 0.3–1)
MONOCYTES NFR BLD: 9.5 % (ref 4–15)
NEUTROPHILS # BLD AUTO: 3.5 K/UL (ref 1.8–7.7)
NEUTROPHILS NFR BLD: 57.6 % (ref 38–73)
NONHDLC SERPL-MCNC: 90 MG/DL
NRBC BLD-RTO: 0 /100 WBC
PLATELET # BLD AUTO: 200 K/UL (ref 150–450)
PMV BLD AUTO: 9.1 FL (ref 9.2–12.9)
POTASSIUM SERPL-SCNC: 4.8 MMOL/L (ref 3.5–5.1)
PROT SERPL-MCNC: 7.4 G/DL (ref 6–8.4)
RBC # BLD AUTO: 3.97 M/UL (ref 4.6–6.2)
SODIUM SERPL-SCNC: 140 MMOL/L (ref 136–145)
TRIGL SERPL-MCNC: 43 MG/DL (ref 30–150)
VIT B12 SERPL-MCNC: 343 PG/ML (ref 210–950)
WBC # BLD AUTO: 6.02 K/UL (ref 3.9–12.7)

## 2021-09-02 PROCEDURE — 83036 HEMOGLOBIN GLYCOSYLATED A1C: CPT | Performed by: FAMILY MEDICINE

## 2021-09-02 PROCEDURE — 80053 COMPREHEN METABOLIC PANEL: CPT | Performed by: FAMILY MEDICINE

## 2021-09-02 PROCEDURE — 82607 VITAMIN B-12: CPT | Performed by: FAMILY MEDICINE

## 2021-09-02 PROCEDURE — 80061 LIPID PANEL: CPT | Performed by: FAMILY MEDICINE

## 2021-09-02 PROCEDURE — 85025 COMPLETE CBC W/AUTO DIFF WBC: CPT | Performed by: FAMILY MEDICINE

## 2021-09-02 PROCEDURE — 36415 COLL VENOUS BLD VENIPUNCTURE: CPT | Mod: PO | Performed by: FAMILY MEDICINE

## 2021-09-02 PROCEDURE — 82746 ASSAY OF FOLIC ACID SERUM: CPT | Performed by: FAMILY MEDICINE

## 2021-09-03 ENCOUNTER — TELEPHONE (OUTPATIENT)
Dept: FAMILY MEDICINE | Facility: CLINIC | Age: 71
End: 2021-09-03

## 2021-09-07 ENCOUNTER — OFFICE VISIT (OUTPATIENT)
Dept: FAMILY MEDICINE | Facility: CLINIC | Age: 71
End: 2021-09-07
Payer: MEDICARE

## 2021-09-07 VITALS
HEART RATE: 64 BPM | SYSTOLIC BLOOD PRESSURE: 100 MMHG | TEMPERATURE: 98 F | RESPIRATION RATE: 18 BRPM | OXYGEN SATURATION: 98 % | DIASTOLIC BLOOD PRESSURE: 60 MMHG | BODY MASS INDEX: 20.9 KG/M2 | HEIGHT: 63 IN | WEIGHT: 117.94 LBS

## 2021-09-07 DIAGNOSIS — E11.69 HYPERLIPIDEMIA ASSOCIATED WITH TYPE 2 DIABETES MELLITUS: ICD-10-CM

## 2021-09-07 DIAGNOSIS — R94.4 DECREASED GFR: ICD-10-CM

## 2021-09-07 DIAGNOSIS — E11.9 TYPE 2 DIABETES MELLITUS WITHOUT COMPLICATION, WITHOUT LONG-TERM CURRENT USE OF INSULIN: Primary | ICD-10-CM

## 2021-09-07 DIAGNOSIS — Z12.5 PROSTATE CANCER SCREENING: ICD-10-CM

## 2021-09-07 DIAGNOSIS — E78.5 HYPERLIPIDEMIA ASSOCIATED WITH TYPE 2 DIABETES MELLITUS: ICD-10-CM

## 2021-09-07 DIAGNOSIS — Z23 NEED FOR PNEUMOCOCCAL VACCINATION: ICD-10-CM

## 2021-09-07 PROCEDURE — 3078F DIAST BP <80 MM HG: CPT | Mod: CPTII,S$GLB,, | Performed by: FAMILY MEDICINE

## 2021-09-07 PROCEDURE — 90732 PPSV23 VACC 2 YRS+ SUBQ/IM: CPT | Mod: S$GLB,,, | Performed by: FAMILY MEDICINE

## 2021-09-07 PROCEDURE — 3044F PR MOST RECENT HEMOGLOBIN A1C LEVEL <7.0%: ICD-10-PCS | Mod: CPTII,S$GLB,, | Performed by: FAMILY MEDICINE

## 2021-09-07 PROCEDURE — 1159F PR MEDICATION LIST DOCUMENTED IN MEDICAL RECORD: ICD-10-PCS | Mod: CPTII,S$GLB,, | Performed by: FAMILY MEDICINE

## 2021-09-07 PROCEDURE — 4010F ACE/ARB THERAPY RXD/TAKEN: CPT | Mod: CPTII,S$GLB,, | Performed by: FAMILY MEDICINE

## 2021-09-07 PROCEDURE — 1101F PR PT FALLS ASSESS DOC 0-1 FALLS W/OUT INJ PAST YR: ICD-10-PCS | Mod: CPTII,S$GLB,, | Performed by: FAMILY MEDICINE

## 2021-09-07 PROCEDURE — 3066F PR DOCUMENTATION OF TREATMENT FOR NEPHROPATHY: ICD-10-PCS | Mod: CPTII,S$GLB,, | Performed by: FAMILY MEDICINE

## 2021-09-07 PROCEDURE — 99499 RISK ADDL DX/OHS AUDIT: ICD-10-PCS | Mod: S$GLB,,, | Performed by: FAMILY MEDICINE

## 2021-09-07 PROCEDURE — 3066F NEPHROPATHY DOC TX: CPT | Mod: CPTII,S$GLB,, | Performed by: FAMILY MEDICINE

## 2021-09-07 PROCEDURE — G0009 ADMIN PNEUMOCOCCAL VACCINE: HCPCS | Mod: S$GLB,,, | Performed by: FAMILY MEDICINE

## 2021-09-07 PROCEDURE — 1159F MED LIST DOCD IN RCRD: CPT | Mod: CPTII,S$GLB,, | Performed by: FAMILY MEDICINE

## 2021-09-07 PROCEDURE — 99214 OFFICE O/P EST MOD 30 MIN: CPT | Mod: 25,S$GLB,, | Performed by: FAMILY MEDICINE

## 2021-09-07 PROCEDURE — 99214 PR OFFICE/OUTPT VISIT, EST, LEVL IV, 30-39 MIN: ICD-10-PCS | Mod: 25,S$GLB,, | Performed by: FAMILY MEDICINE

## 2021-09-07 PROCEDURE — 3074F PR MOST RECENT SYSTOLIC BLOOD PRESSURE < 130 MM HG: ICD-10-PCS | Mod: CPTII,S$GLB,, | Performed by: FAMILY MEDICINE

## 2021-09-07 PROCEDURE — 3061F PR NEG MICROALBUMINURIA RESULT DOCUMENTED/REVIEW: ICD-10-PCS | Mod: CPTII,S$GLB,, | Performed by: FAMILY MEDICINE

## 2021-09-07 PROCEDURE — 3288F FALL RISK ASSESSMENT DOCD: CPT | Mod: CPTII,S$GLB,, | Performed by: FAMILY MEDICINE

## 2021-09-07 PROCEDURE — 1160F RVW MEDS BY RX/DR IN RCRD: CPT | Mod: CPTII,S$GLB,, | Performed by: FAMILY MEDICINE

## 2021-09-07 PROCEDURE — 99999 PR PBB SHADOW E&M-EST. PATIENT-LVL III: ICD-10-PCS | Mod: PBBFAC,,, | Performed by: FAMILY MEDICINE

## 2021-09-07 PROCEDURE — 90732 PNEUMOCOCCAL POLYSACCHARIDE VACCINE 23-VALENT =>2YO SQ IM: ICD-10-PCS | Mod: S$GLB,,, | Performed by: FAMILY MEDICINE

## 2021-09-07 PROCEDURE — 3288F PR FALLS RISK ASSESSMENT DOCUMENTED: ICD-10-PCS | Mod: CPTII,S$GLB,, | Performed by: FAMILY MEDICINE

## 2021-09-07 PROCEDURE — 3044F HG A1C LEVEL LT 7.0%: CPT | Mod: CPTII,S$GLB,, | Performed by: FAMILY MEDICINE

## 2021-09-07 PROCEDURE — 3078F PR MOST RECENT DIASTOLIC BLOOD PRESSURE < 80 MM HG: ICD-10-PCS | Mod: CPTII,S$GLB,, | Performed by: FAMILY MEDICINE

## 2021-09-07 PROCEDURE — 3061F NEG MICROALBUMINURIA REV: CPT | Mod: CPTII,S$GLB,, | Performed by: FAMILY MEDICINE

## 2021-09-07 PROCEDURE — 3074F SYST BP LT 130 MM HG: CPT | Mod: CPTII,S$GLB,, | Performed by: FAMILY MEDICINE

## 2021-09-07 PROCEDURE — 1126F AMNT PAIN NOTED NONE PRSNT: CPT | Mod: CPTII,S$GLB,, | Performed by: FAMILY MEDICINE

## 2021-09-07 PROCEDURE — 3008F PR BODY MASS INDEX (BMI) DOCUMENTED: ICD-10-PCS | Mod: CPTII,S$GLB,, | Performed by: FAMILY MEDICINE

## 2021-09-07 PROCEDURE — 4010F PR ACE/ARB THEARPY RXD/TAKEN: ICD-10-PCS | Mod: CPTII,S$GLB,, | Performed by: FAMILY MEDICINE

## 2021-09-07 PROCEDURE — G0009 PNEUMOCOCCAL POLYSACCHARIDE VACCINE 23-VALENT =>2YO SQ IM: ICD-10-PCS | Mod: S$GLB,,, | Performed by: FAMILY MEDICINE

## 2021-09-07 PROCEDURE — 1101F PT FALLS ASSESS-DOCD LE1/YR: CPT | Mod: CPTII,S$GLB,, | Performed by: FAMILY MEDICINE

## 2021-09-07 PROCEDURE — 1126F PR PAIN SEVERITY QUANTIFIED, NO PAIN PRESENT: ICD-10-PCS | Mod: CPTII,S$GLB,, | Performed by: FAMILY MEDICINE

## 2021-09-07 PROCEDURE — 99999 PR PBB SHADOW E&M-EST. PATIENT-LVL III: CPT | Mod: PBBFAC,,, | Performed by: FAMILY MEDICINE

## 2021-09-07 PROCEDURE — 1160F PR REVIEW ALL MEDS BY PRESCRIBER/CLIN PHARMACIST DOCUMENTED: ICD-10-PCS | Mod: CPTII,S$GLB,, | Performed by: FAMILY MEDICINE

## 2021-09-07 PROCEDURE — 3008F BODY MASS INDEX DOCD: CPT | Mod: CPTII,S$GLB,, | Performed by: FAMILY MEDICINE

## 2021-09-07 PROCEDURE — 99499 UNLISTED E&M SERVICE: CPT | Mod: S$GLB,,, | Performed by: FAMILY MEDICINE

## 2021-09-07 RX ORDER — LISINOPRIL 2.5 MG/1
2.5 TABLET ORAL DAILY
Qty: 90 TABLET | Refills: 3 | Status: CANCELLED | OUTPATIENT
Start: 2021-09-07 | End: 2022-09-02

## 2021-09-15 ENCOUNTER — LAB VISIT (OUTPATIENT)
Dept: LAB | Facility: HOSPITAL | Age: 71
End: 2021-09-15
Attending: FAMILY MEDICINE
Payer: MEDICARE

## 2021-09-15 DIAGNOSIS — R94.4 DECREASED GFR: ICD-10-CM

## 2021-09-15 LAB
ANION GAP SERPL CALC-SCNC: 9 MMOL/L (ref 8–16)
BUN SERPL-MCNC: 27 MG/DL (ref 8–23)
CALCIUM SERPL-MCNC: 9.2 MG/DL (ref 8.7–10.5)
CHLORIDE SERPL-SCNC: 98 MMOL/L (ref 95–110)
CO2 SERPL-SCNC: 27 MMOL/L (ref 23–29)
CREAT SERPL-MCNC: 1 MG/DL (ref 0.5–1.4)
EST. GFR  (AFRICAN AMERICAN): >60 ML/MIN/1.73 M^2
EST. GFR  (NON AFRICAN AMERICAN): >60 ML/MIN/1.73 M^2
GLUCOSE SERPL-MCNC: 86 MG/DL (ref 70–110)
POTASSIUM SERPL-SCNC: 4.1 MMOL/L (ref 3.5–5.1)
SODIUM SERPL-SCNC: 134 MMOL/L (ref 136–145)

## 2021-09-15 PROCEDURE — 80048 BASIC METABOLIC PNL TOTAL CA: CPT | Mod: HCNC | Performed by: FAMILY MEDICINE

## 2021-09-15 PROCEDURE — 36415 COLL VENOUS BLD VENIPUNCTURE: CPT | Mod: HCNC,PO | Performed by: FAMILY MEDICINE

## 2021-09-27 ENCOUNTER — PATIENT OUTREACH (OUTPATIENT)
Dept: ADMINISTRATIVE | Facility: OTHER | Age: 71
End: 2021-09-27

## 2021-09-27 ENCOUNTER — IMMUNIZATION (OUTPATIENT)
Dept: PRIMARY CARE CLINIC | Facility: CLINIC | Age: 71
End: 2021-09-27
Payer: MEDICARE

## 2021-09-27 DIAGNOSIS — Z23 NEED FOR VACCINATION: Primary | ICD-10-CM

## 2021-09-27 PROCEDURE — 0003A COVID-19, MRNA, LNP-S, PF, 30 MCG/0.3 ML DOSE VACCINE: CPT | Mod: S$GLB,,, | Performed by: FAMILY MEDICINE

## 2021-09-27 PROCEDURE — 91300 COVID-19, MRNA, LNP-S, PF, 30 MCG/0.3 ML DOSE VACCINE: CPT | Mod: S$GLB,,, | Performed by: FAMILY MEDICINE

## 2021-09-27 PROCEDURE — 0003A COVID-19, MRNA, LNP-S, PF, 30 MCG/0.3 ML DOSE VACCINE: ICD-10-PCS | Mod: S$GLB,,, | Performed by: FAMILY MEDICINE

## 2021-09-27 PROCEDURE — 91300 COVID-19, MRNA, LNP-S, PF, 30 MCG/0.3 ML DOSE VACCINE: ICD-10-PCS | Mod: S$GLB,,, | Performed by: FAMILY MEDICINE

## 2021-09-28 ENCOUNTER — OFFICE VISIT (OUTPATIENT)
Dept: DERMATOLOGY | Facility: CLINIC | Age: 71
End: 2021-09-28
Payer: MEDICARE

## 2021-09-28 VITALS — HEIGHT: 63 IN | WEIGHT: 117 LBS | BODY MASS INDEX: 20.73 KG/M2

## 2021-09-28 DIAGNOSIS — L57.0 ACTINIC KERATOSES: Primary | ICD-10-CM

## 2021-09-28 DIAGNOSIS — Z85.828 HISTORY OF NONMELANOMA SKIN CANCER: ICD-10-CM

## 2021-09-28 DIAGNOSIS — L72.0 EPIDERMAL INCLUSION CYST: ICD-10-CM

## 2021-09-28 DIAGNOSIS — L90.5 SCAR: ICD-10-CM

## 2021-09-28 DIAGNOSIS — L82.1 SEBORRHEIC KERATOSES: ICD-10-CM

## 2021-09-28 LAB
LEFT EYE DM RETINOPATHY: POSITIVE
RIGHT EYE DM RETINOPATHY: POSITIVE

## 2021-09-28 PROCEDURE — 1160F RVW MEDS BY RX/DR IN RCRD: CPT | Mod: HCNC,CPTII,S$GLB, | Performed by: DERMATOLOGY

## 2021-09-28 PROCEDURE — 3008F BODY MASS INDEX DOCD: CPT | Mod: HCNC,CPTII,S$GLB, | Performed by: DERMATOLOGY

## 2021-09-28 PROCEDURE — 99999 PR PBB SHADOW E&M-EST. PATIENT-LVL III: CPT | Mod: PBBFAC,HCNC,, | Performed by: DERMATOLOGY

## 2021-09-28 PROCEDURE — 3288F PR FALLS RISK ASSESSMENT DOCUMENTED: ICD-10-PCS | Mod: HCNC,CPTII,S$GLB, | Performed by: DERMATOLOGY

## 2021-09-28 PROCEDURE — 3061F NEG MICROALBUMINURIA REV: CPT | Mod: HCNC,CPTII,S$GLB, | Performed by: DERMATOLOGY

## 2021-09-28 PROCEDURE — 3044F HG A1C LEVEL LT 7.0%: CPT | Mod: HCNC,CPTII,S$GLB, | Performed by: DERMATOLOGY

## 2021-09-28 PROCEDURE — 17003 DESTRUCT PREMALG LES 2-14: CPT | Mod: HCNC,S$GLB,, | Performed by: DERMATOLOGY

## 2021-09-28 PROCEDURE — 99213 PR OFFICE/OUTPT VISIT, EST, LEVL III, 20-29 MIN: ICD-10-PCS | Mod: 25,HCNC,S$GLB, | Performed by: DERMATOLOGY

## 2021-09-28 PROCEDURE — 17003 DESTRUCTION, PREMALIGNANT LESIONS; SECOND THROUGH 14 LESIONS: ICD-10-PCS | Mod: HCNC,S$GLB,, | Performed by: DERMATOLOGY

## 2021-09-28 PROCEDURE — 3044F PR MOST RECENT HEMOGLOBIN A1C LEVEL <7.0%: ICD-10-PCS | Mod: HCNC,CPTII,S$GLB, | Performed by: DERMATOLOGY

## 2021-09-28 PROCEDURE — 1101F PR PT FALLS ASSESS DOC 0-1 FALLS W/OUT INJ PAST YR: ICD-10-PCS | Mod: HCNC,CPTII,S$GLB, | Performed by: DERMATOLOGY

## 2021-09-28 PROCEDURE — 99999 PR PBB SHADOW E&M-EST. PATIENT-LVL III: ICD-10-PCS | Mod: PBBFAC,HCNC,, | Performed by: DERMATOLOGY

## 2021-09-28 PROCEDURE — 1101F PT FALLS ASSESS-DOCD LE1/YR: CPT | Mod: HCNC,CPTII,S$GLB, | Performed by: DERMATOLOGY

## 2021-09-28 PROCEDURE — 4010F ACE/ARB THERAPY RXD/TAKEN: CPT | Mod: HCNC,CPTII,S$GLB, | Performed by: DERMATOLOGY

## 2021-09-28 PROCEDURE — 3008F PR BODY MASS INDEX (BMI) DOCUMENTED: ICD-10-PCS | Mod: HCNC,CPTII,S$GLB, | Performed by: DERMATOLOGY

## 2021-09-28 PROCEDURE — 17000 DESTRUCT PREMALG LESION: CPT | Mod: HCNC,S$GLB,, | Performed by: DERMATOLOGY

## 2021-09-28 PROCEDURE — 3061F PR NEG MICROALBUMINURIA RESULT DOCUMENTED/REVIEW: ICD-10-PCS | Mod: HCNC,CPTII,S$GLB, | Performed by: DERMATOLOGY

## 2021-09-28 PROCEDURE — 3066F PR DOCUMENTATION OF TREATMENT FOR NEPHROPATHY: ICD-10-PCS | Mod: HCNC,CPTII,S$GLB, | Performed by: DERMATOLOGY

## 2021-09-28 PROCEDURE — 3288F FALL RISK ASSESSMENT DOCD: CPT | Mod: HCNC,CPTII,S$GLB, | Performed by: DERMATOLOGY

## 2021-09-28 PROCEDURE — 99213 OFFICE O/P EST LOW 20 MIN: CPT | Mod: 25,HCNC,S$GLB, | Performed by: DERMATOLOGY

## 2021-09-28 PROCEDURE — 1159F PR MEDICATION LIST DOCUMENTED IN MEDICAL RECORD: ICD-10-PCS | Mod: HCNC,CPTII,S$GLB, | Performed by: DERMATOLOGY

## 2021-09-28 PROCEDURE — 1159F MED LIST DOCD IN RCRD: CPT | Mod: HCNC,CPTII,S$GLB, | Performed by: DERMATOLOGY

## 2021-09-28 PROCEDURE — 17000 PR DESTRUCTION(LASER SURGERY,CRYOSURGERY,CHEMOSURGERY),PREMALIGNANT LESIONS,FIRST LESION: ICD-10-PCS | Mod: HCNC,S$GLB,, | Performed by: DERMATOLOGY

## 2021-09-28 PROCEDURE — 1126F PR PAIN SEVERITY QUANTIFIED, NO PAIN PRESENT: ICD-10-PCS | Mod: HCNC,CPTII,S$GLB, | Performed by: DERMATOLOGY

## 2021-09-28 PROCEDURE — 4010F PR ACE/ARB THEARPY RXD/TAKEN: ICD-10-PCS | Mod: HCNC,CPTII,S$GLB, | Performed by: DERMATOLOGY

## 2021-09-28 PROCEDURE — 3066F NEPHROPATHY DOC TX: CPT | Mod: HCNC,CPTII,S$GLB, | Performed by: DERMATOLOGY

## 2021-09-28 PROCEDURE — 1160F PR REVIEW ALL MEDS BY PRESCRIBER/CLIN PHARMACIST DOCUMENTED: ICD-10-PCS | Mod: HCNC,CPTII,S$GLB, | Performed by: DERMATOLOGY

## 2021-09-28 PROCEDURE — 1126F AMNT PAIN NOTED NONE PRSNT: CPT | Mod: HCNC,CPTII,S$GLB, | Performed by: DERMATOLOGY

## 2021-09-28 RX ORDER — LISINOPRIL 5 MG/1
TABLET ORAL
COMMUNITY
Start: 2021-09-08 | End: 2022-06-21

## 2021-10-13 ENCOUNTER — PATIENT OUTREACH (OUTPATIENT)
Dept: ADMINISTRATIVE | Facility: HOSPITAL | Age: 71
End: 2021-10-13

## 2021-10-18 ENCOUNTER — PATIENT MESSAGE (OUTPATIENT)
Dept: ADMINISTRATIVE | Facility: HOSPITAL | Age: 71
End: 2021-10-18
Payer: MEDICARE

## 2021-10-27 LAB
LEFT EYE DM RETINOPATHY: POSITIVE
RIGHT EYE DM RETINOPATHY: POSITIVE

## 2021-11-10 ENCOUNTER — PATIENT OUTREACH (OUTPATIENT)
Dept: ADMINISTRATIVE | Facility: HOSPITAL | Age: 71
End: 2021-11-10
Payer: MEDICARE

## 2022-02-21 ENCOUNTER — OFFICE VISIT (OUTPATIENT)
Dept: PODIATRY | Facility: CLINIC | Age: 72
End: 2022-02-21
Payer: MEDICARE

## 2022-02-21 DIAGNOSIS — M79.675 PAIN IN TOES OF BOTH FEET: ICD-10-CM

## 2022-02-21 DIAGNOSIS — L85.1 ACQUIRED KERATODERMA: ICD-10-CM

## 2022-02-21 DIAGNOSIS — E11.42 DIABETIC POLYNEUROPATHY ASSOCIATED WITH TYPE 2 DIABETES MELLITUS: Primary | ICD-10-CM

## 2022-02-21 DIAGNOSIS — E11.9 ENCOUNTER FOR DIABETIC FOOT EXAM: ICD-10-CM

## 2022-02-21 DIAGNOSIS — M20.5X2 HALLUX LIMITUS OF LEFT FOOT: ICD-10-CM

## 2022-02-21 DIAGNOSIS — M79.674 PAIN IN TOES OF BOTH FEET: ICD-10-CM

## 2022-02-21 DIAGNOSIS — M20.5X1 HALLUX LIMITUS OF RIGHT FOOT: ICD-10-CM

## 2022-02-21 DIAGNOSIS — M21.619 BUNION: ICD-10-CM

## 2022-02-21 PROCEDURE — 99214 OFFICE O/P EST MOD 30 MIN: CPT | Mod: S$GLB,,, | Performed by: PODIATRIST

## 2022-02-21 PROCEDURE — 1160F RVW MEDS BY RX/DR IN RCRD: CPT | Mod: CPTII,S$GLB,, | Performed by: PODIATRIST

## 2022-02-21 PROCEDURE — 99214 PR OFFICE/OUTPT VISIT, EST, LEVL IV, 30-39 MIN: ICD-10-PCS | Mod: S$GLB,,, | Performed by: PODIATRIST

## 2022-02-21 PROCEDURE — 1101F PT FALLS ASSESS-DOCD LE1/YR: CPT | Mod: CPTII,S$GLB,, | Performed by: PODIATRIST

## 2022-02-21 PROCEDURE — 3288F PR FALLS RISK ASSESSMENT DOCUMENTED: ICD-10-PCS | Mod: CPTII,S$GLB,, | Performed by: PODIATRIST

## 2022-02-21 PROCEDURE — 1159F MED LIST DOCD IN RCRD: CPT | Mod: CPTII,S$GLB,, | Performed by: PODIATRIST

## 2022-02-21 PROCEDURE — 1160F PR REVIEW ALL MEDS BY PRESCRIBER/CLIN PHARMACIST DOCUMENTED: ICD-10-PCS | Mod: CPTII,S$GLB,, | Performed by: PODIATRIST

## 2022-02-21 PROCEDURE — 1101F PR PT FALLS ASSESS DOC 0-1 FALLS W/OUT INJ PAST YR: ICD-10-PCS | Mod: CPTII,S$GLB,, | Performed by: PODIATRIST

## 2022-02-21 PROCEDURE — 3288F FALL RISK ASSESSMENT DOCD: CPT | Mod: CPTII,S$GLB,, | Performed by: PODIATRIST

## 2022-02-21 PROCEDURE — 1159F PR MEDICATION LIST DOCUMENTED IN MEDICAL RECORD: ICD-10-PCS | Mod: CPTII,S$GLB,, | Performed by: PODIATRIST

## 2022-02-21 NOTE — PATIENT INSTRUCTIONS
What is a Corn? What is a Callus?    Corns and calluses are areas of thickened skin that develop to protect that area from irritation. They occur when something rubs against the foot repeatedly or causes excess pressure against part of the foot. The term callus commonly is used if the thickening of skin occurs on the bottom of the foot, and if thickening occurs on the top of the foot (or toe), it's called a corn. However, the location of the thickened skin is less important than the pattern of thickening: flat, widespread skin thickening indicates a callus, and skin lesions that are thicker or deeper indicate a corn.    Corns and calluses are not contagious but may become painful if they get too thick. In people with diabetes or decreased circulation, they can lead to more serious foot problems.      Causes  Corns often occur where a toe rubs against the interior of a shoe. Excessive pressure at the balls of the feet--common in women who regularly wear high heels--may cause calluses to develop on the balls of the feet.    People with certain deformities of the foot, such as hammer toes, are prone to corns and calluses.      Symptoms  Corns and calluses typically have a rough, dull appearance. They may be raised or rounded, and they can be hard to differentiate from warts. Corns or calluses sometimes cause pain.      Home Care  Mild corns and calluses may not require treatment. If the corn or callus isn't bothering you, it can probably be left alone. It's a good idea, though, to investigate possible causes of the corn or callus. If your footwear is contributing to the development of a corn or callus, it's time to look for other shoes.    Over-the-counter treatments can do more harm than good, especially if you have any medical conditions such as diabetes. Some over-the-counter treatments contain harsh chemicals, which can lead to burns or even foot ulcers.       When to Visit a Podiatrist  If corns or calluses are  causing pain and discomfort or inhibiting your daily life in any way, see a podiatrist. Also, people with diabetes, poor circulation, or other serious illnesses should have their feet checked.      Diagnosis and Treatment  The podiatrist will conduct a complete examination of your feet. X-rays may be taken; your podiatrist may also want to inspect your shoes and watch you walk. He or she will also take a complete medical history. Corns and calluses are diagnosed based on appearance and history.    If you have mild corns or calluses, your podiatrist may suggest changing your shoes and/or adding padding to your shoes. Larger corns and calluses are most effectively reduced (made smaller) with a surgical blade. A podiatrist can use the blade to carefully shave away the thickened, dead skin--right in the office. The procedure is painless because the skin is already dead. Additional treatments may be needed if the corn or callus recurs.    Cortisone injections into the foot or toe may be given if the corn or callus is causing significant pain. Surgery may be necessary in cases that do not respond to conservative treatment.      Prevention  Wear properly fitted shoes. If you have any deformities of the toe or foot, talk to your podiatrist to find out what shoes are best for you.  Gel pad inserts may decrease friction points and pressure. Your podiatrist can help you determine where pads might be useful.        Your Diabetes Foot Care Program    Every day you depend on your feet to keep you moving. But when you have diabetes, your feet need special care. Even a small foot problem can become very serious. So dont take your feet for granted. By working with your diabetes healthcare team, you can learn how to protect your feet and keep them healthy.  Evaluating your feet  An evaluation helps your healthcare provider check the condition of your feet. The evaluation includes a review of your diabetes history and overall health.  It may also include a foot exam, X-rays, or other tests. These can help show problems beneath the skin that you cant see or feel.  Medical history  You will be asked about your overall health and any history of foot problems. Youll also discuss your diabetes history, such as whether your blood sugar level has changed over time. It also includes questions about sensations of pain, tingling, pins and needles, or numbness. Your healthcare provider will also want to know if you have high blood pressure and heart disease, or if you smoke. Be sure to mention any medicines (including over-the-counter), supplements, or herbal remedies you take.  Foot exam  A foot exam checks the condition of different parts of your foot. First, your skin and nails are examined for any signs of infection. Blood flow is checked by feeling for the pulses in each foot. You may also have tests to study the nerves in the foot. These include using a small filament (wire) to see how sensitive your feet are. In certain cases, you will be asked to walk a short distance to check for bone, joint, and muscle problems.  Diagnostic tests  If needed, your healthcare provider will suggest certain tests to learn more about your feet. These include:  Doppler tests to measure blood flow in the feet and lower leg.  X-rays, which can show bone or joint problems.  Other imaging tests, such as an MRI (magnetic resonance imaging), bone scan, and CT (computed tomography) scan. These can help show bone infections.  Other tests, such as vascular tests, which study the blood flow in your feet and legs. You may also have nerve studies to learn how sensitive your feet are.  Creating a foot care program  Based on the evaluation, your healthcare provider will create a foot care program for you. Your program may be as simple as starting a daily self-care routine and changing the types of shoes your wear. It may also involve treating minor foot problems, such as a corn or  blister. In some cases, surgery will be needed to treat an infection or mechanical problems, such as hammer toes.  Preventing problems  When you have diabetes, its easier to prevent problems than to treat them later on. So see your healthcare team for regular checkups and foot care. Your healthcare team can also help you learn more about caring for your feet at home. For example, you may be told to avoid walking barefoot. Or you may be told that special footwear is needed to protect your feet.  Have regular checkups  Foot problems can develop quickly. So be sure to follow your healthcare teams schedule for regular checkups. During office visits, take off your shoes and socks as soon as you get in the exam room. Ask your healthcare provider to examine your feet for problems. This will make it easier to find and treat small skin irritations before they get worse. Regular checkups can also help keep track of the blood flow and feeling in your feet. If you have neuropathy (lack of feeling in your feet), you will need to have checkups more often.  Learn about self-care  The more you know about diabetes and your feet, the easier it will be to prevent problems. Members of your healthcare team can teach you how to inspect your feet and teach you to look for warning signs. They can also give you other foot care tips. During office visits, be sure to ask any questions you have.  Date Last Reviewed: 7/1/2016  © 4755-5462 [employer]. [address] [place] [address], [place] [address]. All rights reserved. This information is not intended as a substitute for professional medical care. Always follow your healthcare professional's instructions.

## 2022-02-21 NOTE — PROGRESS NOTES
[address] [facility] [facility] [address]  [place] [address]  Phone: [phone]   [phone]    Patient's PCP:Petrona Rincon MD  Referring Provider: No ref. provider found    Subjective:      Chief Complaint:: Callouses (Bilateral first toes) and Diabetic Foot Exam (Yearly exam)    HPI  Sukumar Tay is a 71 y.o. male who presents with a complaint of bilateral first toe callus lasting for years. Onset of symptoms hard painful skin formation and reports no trauma.  Current symptoms include pain, hard skin formation.  Aggravating factors are walking and weightbaearing. Symptoms have progressed over time. Also presents to the clinic for a diabetic foot exam.   Pt has seen Petrona Rincon MD in September 2021 who treats them for their diabetes.  Pt has been a diabetic since 2006.  Taking Metforin to treat diabetes.  Denies calf pain while walking.    Blood Sugar: Has not checked recently  Hemoglobin A1c: 5.5      Shoe Size: 8    History reviewed. No pertinent surgical history.  Past Medical History:   Diagnosis Date    Dysuria     H. pylori infection      Family History   Problem Relation Age of Onset    Diabetes Neg Hx     Melanoma Neg Hx     Psoriasis Neg Hx     Lupus Neg Hx     Eczema Neg Hx         Social History:   Marital Status:   Alcohol History:  reports no history of alcohol use.  Tobacco History:  reports that he has never smoked. He has never used smokeless tobacco.  Drug History:  reports no history of drug use.    Review of patient's allergies indicates:   Allergen Reactions    Bactrim [sulfamethoxazole-trimethoprim]     Sulfa (sulfonamide antibiotics)        Current Outpatient Medications   Medication Sig Dispense Refill    ASPIRIN (ASPIR-81 ORAL) Take by mouth.      metFORMIN (GLUCOPHAGE) 500 MG tablet TAKE 1 TABLET DAILY WITH BREAKFAST. 90 tablet 3    pravastatin (PRAVACHOL) 40 MG tablet TAKE 1 TABLET (40 MG TOTAL) BY MOUTH EVERY EVENING. 90 tablet 3    lisinopriL (PRINIVIL,ZESTRIL)  5 MG tablet        No current facility-administered medications for this visit.       Review of Systems   Constitutional: Negative for chills, fatigue, fever and unexpected weight change.   HENT: Negative for hearing loss and trouble swallowing.    Eyes: Negative for photophobia and visual disturbance.   Respiratory: Negative for cough, shortness of breath and wheezing.    Cardiovascular: Negative for chest pain, palpitations and leg swelling.   Gastrointestinal: Negative for abdominal pain and nausea.   Genitourinary: Negative for dysuria and frequency.   Musculoskeletal: Positive for myalgias. Negative for arthralgias, back pain, gait problem and joint swelling.   Skin: Positive for color change. Negative for rash and wound.   Neurological: Negative for tremors, seizures, weakness, numbness and headaches.   Hematological: Does not bruise/bleed easily.         Objective:        Physical Exam:   Foot Exam    General  General Appearance: appears stated age and healthy   Orientation: alert and oriented to person, place, and time   Affect: appropriate   Gait: unimpaired       Right Foot/Ankle     Inspection and Palpation  Ecchymosis: none  Tenderness: great toe metatarsophalangeal joint   Swelling: none   Arch: normal  Hammertoes: absent  Claw Toes: absent  Hallux valgus: yes  Hallux limitus: yes  Skin Exam: callus (First metatarsal head); no drainage, no ulcer and no erythema   Neurovascular  Dorsalis pedis: 2+  Posterior tibial: 2+  Capillary Refill: 2+  Varicose veins: not present  Saphenous nerve sensation: normal  Tibial nerve sensation: normal  Superficial peroneal nerve sensation: normal  Deep peroneal nerve sensation: normal  Sural nerve sensation: normal    Edema  Type of edema: non-pitting    Muscle Strength  Ankle dorsiflexion: 5  Ankle plantar flexion: 5  Ankle inversion: 5  Ankle eversion: 5  Great toe extension: 5  Great toe flexion: 5    Range of Motion    Normal right ankle ROM    Tests  Anterior  drawer: negative   Talar tilt: negative   PT Tinel's sign: negative    Paresthesia: negative    Left Foot/Ankle      Inspection and Palpation  Ecchymosis: none  Tenderness: great toe metatarsophalangeal joint   Swelling: none   Arch: normal  Hammertoes: absent  Claw toes: absent  Hallux valgus: yes  Hallux limitus: yes  Skin Exam: callus (First metatarsal head); no drainage, no ulcer and no erythema   Neurovascular  Dorsalis pedis: 2+  Posterior tibial: 2+  Capillary refill: 2+  Varicose veins: not present  Saphenous nerve sensation: normal  Tibial nerve sensation: normal  Superficial peroneal nerve sensation: normal  Deep peroneal nerve sensation: normal  Sural nerve sensation: normal    Edema  Type of edema: non-pitting    Muscle Strength  Ankle dorsiflexion: 5  Ankle plantar flexion: 5  Ankle inversion: 5  Ankle eversion: 5  Great toe extension: 5  Great toe flexion: 5    Range of Motion    Normal left ankle ROM    Tests  Anterior drawer: negative   Talar tilt: negative   PT Tinel's sign: negative  Paresthesia: negative        Physical Exam  Cardiovascular:      Pulses:           Dorsalis pedis pulses are 2+ on the right side and 2+ on the left side.        Posterior tibial pulses are 2+ on the right side and 2+ on the left side.   Musculoskeletal:      Right foot: Bunion present.      Left foot: Bunion present.   Feet:      Right foot:      Skin integrity: Callus (First metatarsal head) present. No ulcer or erythema.      Left foot:      Skin integrity: Callus (First metatarsal head) present. No ulcer or erythema.         Imaging:  None           Assessment:       1. Diabetic polyneuropathy associated with type 2 diabetes mellitus    2. Acquired keratoderma    3. Bunion    4. Hallux limitus of left foot    5. Hallux limitus of right foot    6. Encounter for diabetic foot exam    7. Pain in toes of both feet      Plan:   Diabetic polyneuropathy associated with type 2 diabetes mellitus    Acquired  keratoderma    Bunion    Hallux limitus of left foot    Hallux limitus of right foot    Encounter for diabetic foot exam    Pain in toes of both feet      Follow up if symptoms worsen or fail to improve.    Procedures        Bilateral calluses were trimmed today as a courtesy.  I recommend pumice stone and urea cream for daily management of the areas.    Counseling:     I provided patient education verbally regarding:   Patient diagnosis, treatment options, as well as alternatives, risks, and benefits.     Counseled patient on the aspects of diabetes and how it pertains to the feet.  I explained the importance of proper diabetic foot care and how it is essential for the health of their feet.    I discussed the importance of knowing their HGA1c and that the level needs to be as close to 6 as possible.  I discussed the increase complications of high blood sugar including stroke, blindness, heart attack, kidney failure and loss of limb secondary to neuropathy and PVD.    Patient  was made aware of inspecting their feet.  Patient was told to be aware of any breaks in the skin or redness.  With neuropathy, these areas are not recognized early due to the numbness.  I discussed the lab test and NCV EMG test and also the role of the neurologist in evaluating the patient.  I discussed Diabetes, lower back issues, metabolic disorders, systemic causes, chemotherapy, viatmain defiencey, heavy metal exposure, as some of the causes.  I also explained that as much as 40% of the time we can not find a cause.  I discussed different treatments available to control the symptoms but whcih may not cure the problem.      Shoe inspection. Patient instructed on proper foot hygeine. We discussed wearing proper shoe gear, daily foot inspections, never walking without protective shoe gear, never putting sharp instruments to feet, routine podiatric nail visits every 2-3 months.        This note was created using Dragon voice recognition  software that occasionally misinterpreted phrases or words.

## 2022-04-21 LAB
LEFT EYE DM RETINOPATHY: POSITIVE
RIGHT EYE DM RETINOPATHY: POSITIVE

## 2022-04-23 DIAGNOSIS — E11.69 HYPERLIPIDEMIA ASSOCIATED WITH TYPE 2 DIABETES MELLITUS: ICD-10-CM

## 2022-04-23 DIAGNOSIS — E78.5 HYPERLIPIDEMIA ASSOCIATED WITH TYPE 2 DIABETES MELLITUS: ICD-10-CM

## 2022-04-23 DIAGNOSIS — E11.9 TYPE 2 DIABETES MELLITUS WITHOUT COMPLICATION, WITHOUT LONG-TERM CURRENT USE OF INSULIN: ICD-10-CM

## 2022-04-26 ENCOUNTER — TELEPHONE (OUTPATIENT)
Dept: FAMILY MEDICINE | Facility: CLINIC | Age: 72
End: 2022-04-26
Payer: MEDICARE

## 2022-04-26 RX ORDER — METFORMIN HYDROCHLORIDE 500 MG/1
TABLET ORAL
Qty: 90 TABLET | Refills: 3 | Status: SHIPPED | OUTPATIENT
Start: 2022-04-26 | End: 2023-04-26

## 2022-04-26 RX ORDER — PRAVASTATIN SODIUM 40 MG/1
40 TABLET ORAL NIGHTLY
Qty: 90 TABLET | Refills: 1 | Status: SHIPPED | OUTPATIENT
Start: 2022-04-26 | End: 2022-12-01

## 2022-04-26 NOTE — TELEPHONE ENCOUNTER
Care Due:                  Date            Visit Type   Department     Provider  --------------------------------------------------------------------------------                                EP -                              PRIMARY      [facility]  Last Visit: 09-      CARE ([facility])   REGGIE Rincon                              EP -                              PRIMARY      [facility]  Next Visit: 09-      CARE ([facility])   MEDICINE       Petrona Rincon                                                            Last  Test          Frequency    Reason                     Performed    Due Date  --------------------------------------------------------------------------------    HBA1C.......  6 months...  metFORMIN................  09- 03-    Powered by [employer]. Reference number: [id number].   4/23/2022 5:02:25 AM CDT  
Refill Routing Note   Medication(s) are not appropriate for processing by [facility] for the following reason(s):      - Required laboratory values are outdated    [facility] action(s):  Defer  Approve          Medication reconciliation completed: No     Appointments  past 12m or future 3m with PCP    Date Provider   Last Visit   9/7/2021 Petrona Rincon MD   Next Visit   9/6/2022 Petrona Rincon MD   ED visits in past 90 days: 0        Note composed:2:45 PM 04/26/2022          
Attending

## 2022-04-26 NOTE — TELEPHONE ENCOUNTER
----- Message from Barbara Brody sent at 4/26/2022  4:09 PM CDT -----  Contact: pt  Type: Needs Medical Advice    Who Called: pt  Best Call Back Number: [phone]    Inquiry/Question: pt states [employer] needs the authorization for Rx. States they've faxed and called haven't received a return call   Please call to advise   Thank you~

## 2022-05-12 ENCOUNTER — IMMUNIZATION (OUTPATIENT)
Dept: PRIMARY CARE CLINIC | Facility: CLINIC | Age: 72
End: 2022-05-12
Payer: MEDICARE

## 2022-05-12 DIAGNOSIS — Z23 NEED FOR VACCINATION: Primary | ICD-10-CM

## 2022-05-12 PROCEDURE — 0054A COVID-19, MRNA, LNP-S, PF, 30 MCG/0.3 ML DOSE VACCINE (PFIZER): CPT | Mod: S$GLB,,, | Performed by: FAMILY MEDICINE

## 2022-05-12 PROCEDURE — 91305 COVID-19, MRNA, LNP-S, PF, 30 MCG/0.3 ML DOSE VACCINE (PFIZER): ICD-10-PCS | Mod: S$GLB,,, | Performed by: FAMILY MEDICINE

## 2022-05-12 PROCEDURE — 91305 COVID-19, MRNA, LNP-S, PF, 30 MCG/0.3 ML DOSE VACCINE (PFIZER): CPT | Mod: S$GLB,,, | Performed by: FAMILY MEDICINE

## 2022-05-12 PROCEDURE — 0054A COVID-19, MRNA, LNP-S, PF, 30 MCG/0.3 ML DOSE VACCINE (PFIZER): ICD-10-PCS | Mod: S$GLB,,, | Performed by: FAMILY MEDICINE

## 2022-05-16 ENCOUNTER — TELEPHONE (OUTPATIENT)
Dept: FAMILY MEDICINE | Facility: CLINIC | Age: 72
End: 2022-05-16
Payer: MEDICARE

## 2022-05-31 ENCOUNTER — PATIENT MESSAGE (OUTPATIENT)
Dept: ADMINISTRATIVE | Facility: HOSPITAL | Age: 72
End: 2022-05-31
Payer: MEDICARE

## 2022-06-10 ENCOUNTER — PATIENT OUTREACH (OUTPATIENT)
Dept: ADMINISTRATIVE | Facility: HOSPITAL | Age: 72
End: 2022-06-10
Payer: MEDICARE

## 2022-06-21 ENCOUNTER — OFFICE VISIT (OUTPATIENT)
Dept: DERMATOLOGY | Facility: CLINIC | Age: 72
End: 2022-06-21
Payer: MEDICARE

## 2022-06-21 VITALS — HEIGHT: 63 IN | BODY MASS INDEX: 20.73 KG/M2 | WEIGHT: 117 LBS

## 2022-06-21 DIAGNOSIS — L72.3 INFLAMED SEBACEOUS CYST: Primary | ICD-10-CM

## 2022-06-21 DIAGNOSIS — L82.1 SEBORRHEIC KERATOSES: ICD-10-CM

## 2022-06-21 DIAGNOSIS — L57.0 ACTINIC KERATOSES: ICD-10-CM

## 2022-06-21 DIAGNOSIS — L90.5 SCAR: ICD-10-CM

## 2022-06-21 DIAGNOSIS — Z85.828 HISTORY OF NONMELANOMA SKIN CANCER: ICD-10-CM

## 2022-06-21 DIAGNOSIS — L81.4 SOLAR LENTIGO: ICD-10-CM

## 2022-06-21 PROCEDURE — 99999 PR PBB SHADOW E&M-EST. PATIENT-LVL III: CPT | Mod: PBBFAC,,, | Performed by: DERMATOLOGY

## 2022-06-21 PROCEDURE — 1159F MED LIST DOCD IN RCRD: CPT | Mod: CPTII,S$GLB,, | Performed by: DERMATOLOGY

## 2022-06-21 PROCEDURE — 1101F PR PT FALLS ASSESS DOC 0-1 FALLS W/OUT INJ PAST YR: ICD-10-PCS | Mod: CPTII,S$GLB,, | Performed by: DERMATOLOGY

## 2022-06-21 PROCEDURE — 99213 PR OFFICE/OUTPT VISIT, EST, LEVL III, 20-29 MIN: ICD-10-PCS | Mod: 25,S$GLB,, | Performed by: DERMATOLOGY

## 2022-06-21 PROCEDURE — 1160F PR REVIEW ALL MEDS BY PRESCRIBER/CLIN PHARMACIST DOCUMENTED: ICD-10-PCS | Mod: CPTII,S$GLB,, | Performed by: DERMATOLOGY

## 2022-06-21 PROCEDURE — 99213 OFFICE O/P EST LOW 20 MIN: CPT | Mod: 25,S$GLB,, | Performed by: DERMATOLOGY

## 2022-06-21 PROCEDURE — 17000 DESTRUCT PREMALG LESION: CPT | Mod: 59,S$GLB,, | Performed by: DERMATOLOGY

## 2022-06-21 PROCEDURE — 3288F PR FALLS RISK ASSESSMENT DOCUMENTED: ICD-10-PCS | Mod: CPTII,S$GLB,, | Performed by: DERMATOLOGY

## 2022-06-21 PROCEDURE — 17003 DESTRUCTION, PREMALIGNANT LESIONS; SECOND THROUGH 14 LESIONS: ICD-10-PCS | Mod: 59,S$GLB,, | Performed by: DERMATOLOGY

## 2022-06-21 PROCEDURE — 10061 PR DRAIN SKIN ABSCESS COMPLIC: ICD-10-PCS | Mod: S$GLB,,, | Performed by: DERMATOLOGY

## 2022-06-21 PROCEDURE — 10061 I&D ABSCESS COMP/MULTIPLE: CPT | Mod: S$GLB,,, | Performed by: DERMATOLOGY

## 2022-06-21 PROCEDURE — 3008F PR BODY MASS INDEX (BMI) DOCUMENTED: ICD-10-PCS | Mod: CPTII,S$GLB,, | Performed by: DERMATOLOGY

## 2022-06-21 PROCEDURE — 1126F PR PAIN SEVERITY QUANTIFIED, NO PAIN PRESENT: ICD-10-PCS | Mod: CPTII,S$GLB,, | Performed by: DERMATOLOGY

## 2022-06-21 PROCEDURE — 1101F PT FALLS ASSESS-DOCD LE1/YR: CPT | Mod: CPTII,S$GLB,, | Performed by: DERMATOLOGY

## 2022-06-21 PROCEDURE — 17000 PR DESTRUCTION(LASER SURGERY,CRYOSURGERY,CHEMOSURGERY),PREMALIGNANT LESIONS,FIRST LESION: ICD-10-PCS | Mod: 59,S$GLB,, | Performed by: DERMATOLOGY

## 2022-06-21 PROCEDURE — 17003 DESTRUCT PREMALG LES 2-14: CPT | Mod: 59,S$GLB,, | Performed by: DERMATOLOGY

## 2022-06-21 PROCEDURE — 99999 PR PBB SHADOW E&M-EST. PATIENT-LVL III: ICD-10-PCS | Mod: PBBFAC,,, | Performed by: DERMATOLOGY

## 2022-06-21 PROCEDURE — 1126F AMNT PAIN NOTED NONE PRSNT: CPT | Mod: CPTII,S$GLB,, | Performed by: DERMATOLOGY

## 2022-06-21 PROCEDURE — 3008F BODY MASS INDEX DOCD: CPT | Mod: CPTII,S$GLB,, | Performed by: DERMATOLOGY

## 2022-06-21 PROCEDURE — 1160F RVW MEDS BY RX/DR IN RCRD: CPT | Mod: CPTII,S$GLB,, | Performed by: DERMATOLOGY

## 2022-06-21 PROCEDURE — 1159F PR MEDICATION LIST DOCUMENTED IN MEDICAL RECORD: ICD-10-PCS | Mod: CPTII,S$GLB,, | Performed by: DERMATOLOGY

## 2022-06-21 PROCEDURE — 3288F FALL RISK ASSESSMENT DOCD: CPT | Mod: CPTII,S$GLB,, | Performed by: DERMATOLOGY

## 2022-06-21 NOTE — PATIENT INSTRUCTIONS
CRYOSURGERY      Your doctor has used a method called cryosurgery to treat your skin condition. Cryosurgery refers to the use of very cold substances to treat a variety of skin conditions such as warts, pre-skin cancers, molluscum contagiosum, sun spots, and several benign growths. The substance we use in cryosurgery is liquid nitrogen and is so cold (-195 degrees Celsius) that is burns when administered.     Following treatment in the office, the skin may immediately burn and become red. You may find the area around the lesion is affected as well. It is sometimes necessary to treat not only the lesion, but a small area of the surrounding normal skin to achieve a good response.     A blister, and even a blood filled blister, may form after treatment.   This is a normal response. If the blister is painful, it is acceptable to sterilize a needle and with rubbing alcohol and gently pop the blister. It is important that you gently wash the area with soap and warm water as the blister fluid may contain wart virus if a wart was treated. Do no remove the roof of the blister.     The area treated can take anywhere from 1-3 weeks to heal. Healing time depends on the kind skin lesion treated, the location, and how aggressively the lesion was treated. It is recommended that the areas treated are covered with Vaseline or bacitracin ointment and a band-aid. If a band-aid is not practical, just ointment applied several times per day will do. Keeping these areas moist will speed the healing time.    Treatment with liquid nitrogen can leave a scar. In dark skin, it may be a light or dark scar, in light skin it may be a white or pink scar. These will generally fade with time, but may never go away completely.     If you have any concerns after your treatment, please feel free to call the office.         [facility] DERMATOLOGY  [address] [place] [address], [address] [address]  Dept: [phone] Surgery  Wound Care    Your doctor has performed an excision today.  A bandage and vaseline ointment has been placed over the site.  This should remain in place for 24 hours.  It is recommended that you keep the area dry for the first 24 hours.  After 24 hours, you may remove the band aid and wash the area with warm soap and water and apply Vaseline jelly or aquaphore.  Many patients prefer to use Neosporin or Bacitracin ointment.  This is acceptable; however know that you can develop an allergy to this medication even if you have used it safely for years.  It is important to keep the area moist.  Letting it dry out and get air slows healing time, will worsen the scar, and make it more difficult to remove the stitches if they were placed.        If you notice increasing redness, tenderness, pain, or yellow drainage at the biopsy or surgical site, please notify your doctor.  These are signs of an infection.    If your biopsy/surgical site is bleeding, apply firm pressure for 15 minutes straight.  Repeat for another 15 minutes, if it is still bleeding.   If the surgical site continues to bleed, then please contact your doctor.    [facility]  [facility] - DERMATOLOGY  [address] [place] [address] [address]  [facility] [address]  Dept: [phone]

## 2022-06-21 NOTE — PROGRESS NOTES
Subjective:       Patient ID:  Sukumar Tay is a 71 y.o. male who presents for   Chief Complaint   Patient presents with    Skin Check     ubsc    Spot     On back     LOV- 9/28/21- ak, sk    Here today for a UBSC  Has spots on back     Derm Hx:   BCC - Nose-6-19  Has no fhx of MM    Current Outpatient Medications:     metFORMIN (GLUCOPHAGE) 500 MG tablet, TAKE 1 TABLET DAILY WITH BREAKFAST., Disp: 90 tablet, Rfl: 3    pravastatin (PRAVACHOL) 40 MG tablet, TAKE 1 TABLET (40 MG TOTAL) BY MOUTH EVERY EVENING., Disp: 90 tablet, Rfl: 1         Review of Systems   Constitutional: Negative for fever, chills and fatigue.   Respiratory: Negative for cough and shortness of breath.    Skin: Positive for wears hat. Negative for daily sunscreen use and activity-related sunscreen use.   Hematologic/Lymphatic: Does not bruise/bleed easily.                            Objective:    Physical Exam   Constitutional: He appears well-developed and well-nourished. No distress.   Neurological: He is alert and oriented to person, place, and time. He is not disoriented.   Psychiatric: He has a normal mood and affect.   Skin:   Areas Examined (abnormalities noted in diagram):   Scalp / Hair Palpated and Inspected  Head / Face Inspection Performed  Neck Inspection Performed  Chest / Axilla Inspection Performed  Abdomen Inspection Performed  Back Inspection Performed  RUE Inspected  LUE Inspection Performed  Nails and Digits Inspection Performed                   Diagram Legend     Erythematous scaling macule/papule c/w actinic keratosis       Vascular papule c/w angioma      Pigmented verrucoid papule/plaque c/w seborrheic keratosis      Yellow umbilicated papule c/w sebaceous hyperplasia      Irregularly shaped tan macule c/w lentigo     1-2 mm smooth white papules consistent with Milia      Movable subcutaneous cyst with punctum c/w epidermal inclusion cyst      Subcutaneous movable cyst c/w pilar cyst      Firm pink to brown  papule c/w dermatofibroma      Pedunculated fleshy papule(s) c/w skin tag(s)      Evenly pigmented macule c/w junctional nevus     Mildly variegated pigmented, slightly irregular-bordered macule c/w mildly atypical nevus      Flesh colored to evenly pigmented papule c/w intradermal nevus       Pink pearly papule/plaque c/w basal cell carcinoma      Erythematous hyperkeratotic cursted plaque c/w SCC      Surgical scar with no sign of skin cancer recurrence      Open and closed comedones      Inflammatory papules and pustules      Verrucoid papule consistent consistent with wart     Erythematous eczematous patches and plaques     Dystrophic onycholytic nail with subungual debris c/w onychomycosis     Umbilicated papule    Erythematous-base heme-crusted tan verrucoid plaque consistent with inflamed seborrheic keratosis     Erythematous Silvery Scaling Plaque c/w Psoriasis     See annotation      Assessment / Plan:        Inflamed sebaceous cyst  Risks, benefits and alternatives discussed. Informed verbal consent obtained.  Area cleaned with alcohol. 9cc lidocaine with  epinephrine was used as a local anesthetic. Lesion incised with #11 blade and drained on today's date. Cavity curetted with disposable curette and treated with electrodesiccation. Allow to heal by secondary intention. Procedure tolerated without complications. Wound dressed with gauze and tape.     Actinic keratoses  Cryosurgery Procedure Note    Verbal consent from the patient is obtained and the patient is aware of the precancerous quality and need for treatment of these lesions. Liquid nitrogen cryosurgery is applied to the 5 actinic keratoses, as detailed in the physical exam, to produce a freeze injury. The patient is aware that blisters may form and is instructed on wound care with gentle cleansing and use of vaseline ointment to keep moist until healed. The patient is supplied a handout on cryosurgery and is instructed to call if lesions do not  completely resolve.    Seborrheic keratoses  These are benign inherited growths without a malignant potential. Reassurance given to patient. No treatment is necessary.     Solar lentigo  This is a benign hyperpigmented sun induced lesion. Daily sun protection will reduce the number of new lesions. Treatment of these benign lesions are considered cosmetic.    History of nonmelanoma skin cancer  Scar  Area of previous NMSC (R nasal tip) examined. Referred to Dr ALANIZ for Mohs, decided not to proceed. Difficult to gauge whether this is only a scar or whether residual BCC is present at the base. No bleeding, no growth, comfortable monitoring for now. Applies efudex cream BID for 2 weeks episodically.  Upper body skin examination performed today including at least 9 points as noted in physical examination. No lesions suspicious for malignancy noted.    Patient instructed in importance in daily broad spectrum sun protection of at least spf 30. Mineral sunscreen ingredients preferred (Zinc +/- Titanium) and can be found OTC.   Recommend Elta MD for daily use on face and neck.  Patient encouraged to wear hat for all outdoor exposure.   Also discussed sun avoidance and use of protective clothing.               No follow-ups on file.

## 2022-08-01 ENCOUNTER — OFFICE VISIT (OUTPATIENT)
Dept: URGENT CARE | Facility: CLINIC | Age: 72
End: 2022-08-01
Payer: MEDICARE

## 2022-08-01 VITALS
HEART RATE: 65 BPM | DIASTOLIC BLOOD PRESSURE: 63 MMHG | OXYGEN SATURATION: 99 % | SYSTOLIC BLOOD PRESSURE: 105 MMHG | BODY MASS INDEX: 21.62 KG/M2 | RESPIRATION RATE: 18 BRPM | WEIGHT: 122 LBS | HEIGHT: 63 IN | TEMPERATURE: 97 F

## 2022-08-01 DIAGNOSIS — Z20.822 COVID-19 VIRUS NOT DETECTED: ICD-10-CM

## 2022-08-01 DIAGNOSIS — Z11.52 ENCOUNTER FOR SCREENING FOR COVID-19: Primary | ICD-10-CM

## 2022-08-01 DIAGNOSIS — Z86.39 HISTORY OF TYPE 2 DIABETES MELLITUS: ICD-10-CM

## 2022-08-01 DIAGNOSIS — Z71.84 ENCOUNTER FOR HEALTH COUNSELING RELATED TO TRAVEL: ICD-10-CM

## 2022-08-01 LAB
CTP QC/QA: YES
SARS-COV-2 AG RESP QL IA.RAPID: NEGATIVE

## 2022-08-01 PROCEDURE — 87811 SARS CORONAVIRUS 2 ANTIGEN POCT, MANUAL READ: ICD-10-PCS | Mod: QW,S$GLB,, | Performed by: NURSE PRACTITIONER

## 2022-08-01 PROCEDURE — 1159F PR MEDICATION LIST DOCUMENTED IN MEDICAL RECORD: ICD-10-PCS | Mod: CPTII,S$GLB,, | Performed by: NURSE PRACTITIONER

## 2022-08-01 PROCEDURE — 3074F PR MOST RECENT SYSTOLIC BLOOD PRESSURE < 130 MM HG: ICD-10-PCS | Mod: CPTII,S$GLB,, | Performed by: NURSE PRACTITIONER

## 2022-08-01 PROCEDURE — 3078F DIAST BP <80 MM HG: CPT | Mod: CPTII,S$GLB,, | Performed by: NURSE PRACTITIONER

## 2022-08-01 PROCEDURE — 3078F PR MOST RECENT DIASTOLIC BLOOD PRESSURE < 80 MM HG: ICD-10-PCS | Mod: CPTII,S$GLB,, | Performed by: NURSE PRACTITIONER

## 2022-08-01 PROCEDURE — 3008F BODY MASS INDEX DOCD: CPT | Mod: CPTII,S$GLB,, | Performed by: NURSE PRACTITIONER

## 2022-08-01 PROCEDURE — 3074F SYST BP LT 130 MM HG: CPT | Mod: CPTII,S$GLB,, | Performed by: NURSE PRACTITIONER

## 2022-08-01 PROCEDURE — 3008F PR BODY MASS INDEX (BMI) DOCUMENTED: ICD-10-PCS | Mod: CPTII,S$GLB,, | Performed by: NURSE PRACTITIONER

## 2022-08-01 PROCEDURE — 1160F PR REVIEW ALL MEDS BY PRESCRIBER/CLIN PHARMACIST DOCUMENTED: ICD-10-PCS | Mod: CPTII,S$GLB,, | Performed by: NURSE PRACTITIONER

## 2022-08-01 PROCEDURE — 99203 OFFICE O/P NEW LOW 30 MIN: CPT | Mod: S$GLB,,, | Performed by: NURSE PRACTITIONER

## 2022-08-01 PROCEDURE — 1160F RVW MEDS BY RX/DR IN RCRD: CPT | Mod: CPTII,S$GLB,, | Performed by: NURSE PRACTITIONER

## 2022-08-01 PROCEDURE — 99203 PR OFFICE/OUTPT VISIT, NEW, LEVL III, 30-44 MIN: ICD-10-PCS | Mod: S$GLB,,, | Performed by: NURSE PRACTITIONER

## 2022-08-01 PROCEDURE — 1159F MED LIST DOCD IN RCRD: CPT | Mod: CPTII,S$GLB,, | Performed by: NURSE PRACTITIONER

## 2022-08-01 PROCEDURE — 87811 SARS-COV-2 COVID19 W/OPTIC: CPT | Mod: QW,S$GLB,, | Performed by: NURSE PRACTITIONER

## 2022-08-01 NOTE — PROGRESS NOTES
"Subjective:       Patient ID: Sukumar Tay is a 72 y.o. male.    Vitals:  height is 5' 3" (1.6 m) and weight is 55.3 kg (122 lb). His oral temperature is 97.2 °F (36.2 °C). His blood pressure is 105/63 and his pulse is 65. His respiration is 18 and oxygen saturation is 99%.     Chief Complaint: COVID-19 Concerns    Pt states "he is going on a crusie and needs a rapid covid test. He is not having any symptoms."      Constitution: Negative for chills, fatigue and fever.   HENT: Negative for congestion and sore throat.    Respiratory: Negative for cough.    Gastrointestinal: Negative for nausea, vomiting and diarrhea.   Musculoskeletal: Negative for muscle ache.   Skin: Negative for rash.   Neurological: Negative for headaches.       Objective:      Physical Exam   Constitutional: He is oriented to person, place, and time. He appears well-developed.  Non-toxic appearance. He does not appear ill. No distress.   HENT:   Head: Normocephalic and atraumatic.   Nose: Nose normal.   Mouth/Throat: Oropharynx is clear and moist. Mucous membranes are moist.   Eyes: Conjunctivae and EOM are normal.   Cardiovascular: Normal rate, regular rhythm and normal heart sounds.   Pulmonary/Chest: Effort normal and breath sounds normal. No respiratory distress. He has no wheezes. He has no rhonchi. He has no rales.   Abdominal: Normal appearance.   Neurological: no focal deficit. He is alert and oriented to person, place, and time.   Skin: Skin is warm, dry and not diaphoretic. Capillary refill takes 2 to 3 seconds.   Psychiatric: His behavior is normal. Mood normal.   Nursing note and vitals reviewed.        Assessment:       1. Encounter for screening for COVID-19    2. COVID-19 virus not detected    3. History of type 2 diabetes mellitus    4. Encounter for health counseling related to travel          Plan:         Encounter for screening for COVID-19  -     SARS Coronavirus 2 Antigen, POCT Manual Read    COVID-19 virus not " detected    History of type 2 diabetes mellitus    Encounter for health counseling related to travel

## 2022-08-24 ENCOUNTER — PATIENT MESSAGE (OUTPATIENT)
Dept: ADMINISTRATIVE | Facility: HOSPITAL | Age: 72
End: 2022-08-24
Payer: MEDICARE

## 2022-09-01 ENCOUNTER — LAB VISIT (OUTPATIENT)
Dept: LAB | Facility: HOSPITAL | Age: 72
End: 2022-09-01
Attending: FAMILY MEDICINE
Payer: MEDICARE

## 2022-09-01 DIAGNOSIS — E11.69 HYPERLIPIDEMIA ASSOCIATED WITH TYPE 2 DIABETES MELLITUS: ICD-10-CM

## 2022-09-01 DIAGNOSIS — E11.9 TYPE 2 DIABETES MELLITUS WITHOUT COMPLICATION, WITHOUT LONG-TERM CURRENT USE OF INSULIN: ICD-10-CM

## 2022-09-01 DIAGNOSIS — Z12.5 PROSTATE CANCER SCREENING: ICD-10-CM

## 2022-09-01 DIAGNOSIS — E78.5 HYPERLIPIDEMIA ASSOCIATED WITH TYPE 2 DIABETES MELLITUS: ICD-10-CM

## 2022-09-01 LAB
ALBUMIN SERPL BCP-MCNC: 4.1 G/DL (ref 3.5–5.2)
ALP SERPL-CCNC: 51 U/L (ref 55–135)
ALT SERPL W/O P-5'-P-CCNC: 20 U/L (ref 10–44)
ANION GAP SERPL CALC-SCNC: 10 MMOL/L (ref 8–16)
AST SERPL-CCNC: 29 U/L (ref 10–40)
BASOPHILS # BLD AUTO: 0.03 K/UL (ref 0–0.2)
BASOPHILS NFR BLD: 0.5 % (ref 0–1.9)
BILIRUB SERPL-MCNC: 0.8 MG/DL (ref 0.1–1)
BUN SERPL-MCNC: 27 MG/DL (ref 8–23)
CALCIUM SERPL-MCNC: 9.6 MG/DL (ref 8.7–10.5)
CHLORIDE SERPL-SCNC: 101 MMOL/L (ref 95–110)
CHOLEST SERPL-MCNC: 125 MG/DL (ref 120–199)
CHOLEST/HDLC SERPL: 3 {RATIO} (ref 2–5)
CO2 SERPL-SCNC: 27 MMOL/L (ref 23–29)
COMPLEXED PSA SERPL-MCNC: 0.71 NG/ML (ref 0–4)
CREAT SERPL-MCNC: 1.3 MG/DL (ref 0.5–1.4)
DIFFERENTIAL METHOD: ABNORMAL
EOSINOPHIL # BLD AUTO: 0.1 K/UL (ref 0–0.5)
EOSINOPHIL NFR BLD: 1.7 % (ref 0–8)
ERYTHROCYTE [DISTWIDTH] IN BLOOD BY AUTOMATED COUNT: 12.3 % (ref 11.5–14.5)
EST. GFR  (NO RACE VARIABLE): 58.7 ML/MIN/1.73 M^2
ESTIMATED AVG GLUCOSE: 120 MG/DL (ref 68–131)
GLUCOSE SERPL-MCNC: 105 MG/DL (ref 70–110)
HBA1C MFR BLD: 5.8 % (ref 4–5.6)
HCT VFR BLD AUTO: 42.2 % (ref 40–54)
HDLC SERPL-MCNC: 41 MG/DL (ref 40–75)
HDLC SERPL: 32.8 % (ref 20–50)
HGB BLD-MCNC: 13.6 G/DL (ref 14–18)
IMM GRANULOCYTES # BLD AUTO: 0.01 K/UL (ref 0–0.04)
IMM GRANULOCYTES NFR BLD AUTO: 0.2 % (ref 0–0.5)
LDLC SERPL CALC-MCNC: 73.4 MG/DL (ref 63–159)
LYMPHOCYTES # BLD AUTO: 1.7 K/UL (ref 1–4.8)
LYMPHOCYTES NFR BLD: 27.9 % (ref 18–48)
MCH RBC QN AUTO: 31.4 PG (ref 27–31)
MCHC RBC AUTO-ENTMCNC: 32.2 G/DL (ref 32–36)
MCV RBC AUTO: 98 FL (ref 82–98)
MONOCYTES # BLD AUTO: 0.6 K/UL (ref 0.3–1)
MONOCYTES NFR BLD: 10.3 % (ref 4–15)
NEUTROPHILS # BLD AUTO: 3.5 K/UL (ref 1.8–7.7)
NEUTROPHILS NFR BLD: 59.4 % (ref 38–73)
NONHDLC SERPL-MCNC: 84 MG/DL
NRBC BLD-RTO: 0 /100 WBC
PLATELET # BLD AUTO: 278 K/UL (ref 150–450)
PMV BLD AUTO: 9.2 FL (ref 9.2–12.9)
POTASSIUM SERPL-SCNC: 5.6 MMOL/L (ref 3.5–5.1)
PROT SERPL-MCNC: 7.3 G/DL (ref 6–8.4)
RBC # BLD AUTO: 4.33 M/UL (ref 4.6–6.2)
SODIUM SERPL-SCNC: 138 MMOL/L (ref 136–145)
TRIGL SERPL-MCNC: 53 MG/DL (ref 30–150)
WBC # BLD AUTO: 5.91 K/UL (ref 3.9–12.7)

## 2022-09-01 PROCEDURE — 80061 LIPID PANEL: CPT | Performed by: FAMILY MEDICINE

## 2022-09-01 PROCEDURE — 84153 ASSAY OF PSA TOTAL: CPT | Performed by: FAMILY MEDICINE

## 2022-09-01 PROCEDURE — 80053 COMPREHEN METABOLIC PANEL: CPT | Performed by: FAMILY MEDICINE

## 2022-09-01 PROCEDURE — 36415 COLL VENOUS BLD VENIPUNCTURE: CPT | Mod: PO | Performed by: FAMILY MEDICINE

## 2022-09-01 PROCEDURE — 85025 COMPLETE CBC W/AUTO DIFF WBC: CPT | Performed by: FAMILY MEDICINE

## 2022-09-01 PROCEDURE — 83036 HEMOGLOBIN GLYCOSYLATED A1C: CPT | Performed by: FAMILY MEDICINE

## 2022-09-06 ENCOUNTER — OFFICE VISIT (OUTPATIENT)
Dept: FAMILY MEDICINE | Facility: CLINIC | Age: 72
End: 2022-09-06
Payer: MEDICARE

## 2022-09-06 VITALS
DIASTOLIC BLOOD PRESSURE: 60 MMHG | HEART RATE: 61 BPM | BODY MASS INDEX: 21.87 KG/M2 | TEMPERATURE: 98 F | OXYGEN SATURATION: 98 % | RESPIRATION RATE: 16 BRPM | WEIGHT: 123.44 LBS | SYSTOLIC BLOOD PRESSURE: 110 MMHG | HEIGHT: 63 IN

## 2022-09-06 DIAGNOSIS — E11.9 TYPE 2 DIABETES MELLITUS WITHOUT COMPLICATION, WITHOUT LONG-TERM CURRENT USE OF INSULIN: Primary | ICD-10-CM

## 2022-09-06 DIAGNOSIS — N40.1 BENIGN PROSTATIC HYPERPLASIA WITH INCOMPLETE BLADDER EMPTYING: ICD-10-CM

## 2022-09-06 DIAGNOSIS — R39.14 BENIGN PROSTATIC HYPERPLASIA WITH INCOMPLETE BLADDER EMPTYING: ICD-10-CM

## 2022-09-06 DIAGNOSIS — R94.4 DECREASED GFR: ICD-10-CM

## 2022-09-06 DIAGNOSIS — E78.5 HYPERLIPIDEMIA ASSOCIATED WITH TYPE 2 DIABETES MELLITUS: ICD-10-CM

## 2022-09-06 DIAGNOSIS — E11.69 HYPERLIPIDEMIA ASSOCIATED WITH TYPE 2 DIABETES MELLITUS: ICD-10-CM

## 2022-09-06 PROCEDURE — 99999 PR PBB SHADOW E&M-EST. PATIENT-LVL III: CPT | Mod: PBBFAC,,, | Performed by: FAMILY MEDICINE

## 2022-09-06 PROCEDURE — 3008F PR BODY MASS INDEX (BMI) DOCUMENTED: ICD-10-PCS | Mod: CPTII,S$GLB,, | Performed by: FAMILY MEDICINE

## 2022-09-06 PROCEDURE — 1160F PR REVIEW ALL MEDS BY PRESCRIBER/CLIN PHARMACIST DOCUMENTED: ICD-10-PCS | Mod: CPTII,S$GLB,, | Performed by: FAMILY MEDICINE

## 2022-09-06 PROCEDURE — 99999 PR PBB SHADOW E&M-EST. PATIENT-LVL III: ICD-10-PCS | Mod: PBBFAC,,, | Performed by: FAMILY MEDICINE

## 2022-09-06 PROCEDURE — 3078F DIAST BP <80 MM HG: CPT | Mod: CPTII,S$GLB,, | Performed by: FAMILY MEDICINE

## 2022-09-06 PROCEDURE — 1160F RVW MEDS BY RX/DR IN RCRD: CPT | Mod: CPTII,S$GLB,, | Performed by: FAMILY MEDICINE

## 2022-09-06 PROCEDURE — 1159F MED LIST DOCD IN RCRD: CPT | Mod: CPTII,S$GLB,, | Performed by: FAMILY MEDICINE

## 2022-09-06 PROCEDURE — 1159F PR MEDICATION LIST DOCUMENTED IN MEDICAL RECORD: ICD-10-PCS | Mod: CPTII,S$GLB,, | Performed by: FAMILY MEDICINE

## 2022-09-06 PROCEDURE — 3044F HG A1C LEVEL LT 7.0%: CPT | Mod: CPTII,S$GLB,, | Performed by: FAMILY MEDICINE

## 2022-09-06 PROCEDURE — 3078F PR MOST RECENT DIASTOLIC BLOOD PRESSURE < 80 MM HG: ICD-10-PCS | Mod: CPTII,S$GLB,, | Performed by: FAMILY MEDICINE

## 2022-09-06 PROCEDURE — 1126F AMNT PAIN NOTED NONE PRSNT: CPT | Mod: CPTII,S$GLB,, | Performed by: FAMILY MEDICINE

## 2022-09-06 PROCEDURE — 3074F PR MOST RECENT SYSTOLIC BLOOD PRESSURE < 130 MM HG: ICD-10-PCS | Mod: CPTII,S$GLB,, | Performed by: FAMILY MEDICINE

## 2022-09-06 PROCEDURE — 3008F BODY MASS INDEX DOCD: CPT | Mod: CPTII,S$GLB,, | Performed by: FAMILY MEDICINE

## 2022-09-06 PROCEDURE — 3061F NEG MICROALBUMINURIA REV: CPT | Mod: CPTII,S$GLB,, | Performed by: FAMILY MEDICINE

## 2022-09-06 PROCEDURE — 99397 PR PREVENTIVE VISIT,EST,65 & OVER: ICD-10-PCS | Mod: S$GLB,,, | Performed by: FAMILY MEDICINE

## 2022-09-06 PROCEDURE — 3288F PR FALLS RISK ASSESSMENT DOCUMENTED: ICD-10-PCS | Mod: CPTII,S$GLB,, | Performed by: FAMILY MEDICINE

## 2022-09-06 PROCEDURE — 3066F PR DOCUMENTATION OF TREATMENT FOR NEPHROPATHY: ICD-10-PCS | Mod: CPTII,S$GLB,, | Performed by: FAMILY MEDICINE

## 2022-09-06 PROCEDURE — 3061F PR NEG MICROALBUMINURIA RESULT DOCUMENTED/REVIEW: ICD-10-PCS | Mod: CPTII,S$GLB,, | Performed by: FAMILY MEDICINE

## 2022-09-06 PROCEDURE — 99397 PER PM REEVAL EST PAT 65+ YR: CPT | Mod: S$GLB,,, | Performed by: FAMILY MEDICINE

## 2022-09-06 PROCEDURE — 3288F FALL RISK ASSESSMENT DOCD: CPT | Mod: CPTII,S$GLB,, | Performed by: FAMILY MEDICINE

## 2022-09-06 PROCEDURE — 3074F SYST BP LT 130 MM HG: CPT | Mod: CPTII,S$GLB,, | Performed by: FAMILY MEDICINE

## 2022-09-06 PROCEDURE — 1126F PR PAIN SEVERITY QUANTIFIED, NO PAIN PRESENT: ICD-10-PCS | Mod: CPTII,S$GLB,, | Performed by: FAMILY MEDICINE

## 2022-09-06 PROCEDURE — 1101F PR PT FALLS ASSESS DOC 0-1 FALLS W/OUT INJ PAST YR: ICD-10-PCS | Mod: CPTII,S$GLB,, | Performed by: FAMILY MEDICINE

## 2022-09-06 PROCEDURE — 3044F PR MOST RECENT HEMOGLOBIN A1C LEVEL <7.0%: ICD-10-PCS | Mod: CPTII,S$GLB,, | Performed by: FAMILY MEDICINE

## 2022-09-06 PROCEDURE — 1101F PT FALLS ASSESS-DOCD LE1/YR: CPT | Mod: CPTII,S$GLB,, | Performed by: FAMILY MEDICINE

## 2022-09-06 PROCEDURE — 99499 UNLISTED E&M SERVICE: CPT | Mod: S$GLB,,, | Performed by: FAMILY MEDICINE

## 2022-09-06 PROCEDURE — 3066F NEPHROPATHY DOC TX: CPT | Mod: CPTII,S$GLB,, | Performed by: FAMILY MEDICINE

## 2022-09-06 NOTE — PROGRESS NOTES
Subjective:       Patient ID: Sukumar Tay is a 72 y.o. male.    Chief Complaint: Annual Exam    HPI  Review of Systems   Constitutional:  Negative for fatigue and unexpected weight change.   Respiratory:  Negative for chest tightness and shortness of breath.    Cardiovascular:  Negative for chest pain, palpitations and leg swelling.   Gastrointestinal:  Negative for abdominal pain.   Musculoskeletal:  Negative for arthralgias.   Neurological:  Negative for dizziness, syncope, light-headedness and headaches.     Patient Active Problem List   Diagnosis    Diabetes mellitus    Gout    Hyperlipidemia associated with type 2 diabetes mellitus    Macrocytic anemia     Patient is here for a chronic conditions follow up.    Reviewed labs 9/2022-GFR  down again. Usually > 60 until this year.  States he is not drinking fluids as much in effort to not have to go to the bathroom so often. Has luts sx due to prostate enlargement. Has f/u urology Dr. Iman bolivar.  Has frequency, incomplete emptying, dribbling and weak stream worsening over time     Type 2 dm with retinopathy. Eye care Dr. Stout 10/22/2021.  A1c 5.8. lipids at goal on pravachol 40. On asa 81mg.  urine ma wnl-not on ACE I    Podiatry Dr. LOGAN Mancil exam 2/22     Folate/b12 nl    mild normocytic anemia  Objective:      Physical Exam  Vitals and nursing note reviewed.   Constitutional:       Appearance: He is well-developed.   Cardiovascular:      Rate and Rhythm: Normal rate and regular rhythm.      Heart sounds: Normal heart sounds.   Pulmonary:      Effort: Pulmonary effort is normal.      Breath sounds: Normal breath sounds.   Skin:     General: Skin is warm and dry.   Neurological:      Mental Status: He is alert and oriented to person, place, and time.       Assessment:       1. Type 2 diabetes mellitus without complication, without long-term current use of insulin    2. Hyperlipidemia associated with type 2 diabetes mellitus    3. Decreased GFR    4.  Benign prostatic hyperplasia with incomplete bladder emptying        Plan:         1. Type 2 diabetes mellitus without complication, without long-term current use of insulin  Controlled on current medications.  Continue current medications.    - CBC Auto Differential; Future  - Comprehensive Metabolic Panel; Future  - Hemoglobin A1C; Future    2. Hyperlipidemia associated with type 2 diabetes mellitus  Controlled on current medications.  Continue current medications.    - Lipid Panel; Future    3. Decreased GFR  No nsaids use.  Increase fluids.          4. Benign prostatic hyperplasia with incomplete bladder emptying  F/u urology for eval and treatment    Time spent with patient:  20 min    Patient with be reevaluated in 6 months or sooner prn    Greater than 50% of this visit was spent counseling as described in above documentation:Yes

## 2022-09-09 ENCOUNTER — TELEPHONE (OUTPATIENT)
Dept: UROLOGY | Facility: CLINIC | Age: 72
End: 2022-09-09
Payer: MEDICARE

## 2022-09-09 NOTE — TELEPHONE ENCOUNTER
pre appt call   Pt with some concern for bph  Past urolgical hx is with dr clark  Psa hx noted in epic by pcp   Confirmed appt and provide urine upon arrival

## 2022-09-12 ENCOUNTER — OFFICE VISIT (OUTPATIENT)
Dept: UROLOGY | Facility: CLINIC | Age: 72
End: 2022-09-12
Payer: MEDICARE

## 2022-09-12 VITALS
DIASTOLIC BLOOD PRESSURE: 64 MMHG | RESPIRATION RATE: 18 BRPM | HEART RATE: 54 BPM | WEIGHT: 118 LBS | BODY MASS INDEX: 20.91 KG/M2 | SYSTOLIC BLOOD PRESSURE: 132 MMHG | HEIGHT: 63 IN

## 2022-09-12 DIAGNOSIS — N40.1 BPH WITH OBSTRUCTION/LOWER URINARY TRACT SYMPTOMS: Primary | ICD-10-CM

## 2022-09-12 DIAGNOSIS — R31.29 MICROHEMATURIA: ICD-10-CM

## 2022-09-12 DIAGNOSIS — N13.8 BPH WITH OBSTRUCTION/LOWER URINARY TRACT SYMPTOMS: Primary | ICD-10-CM

## 2022-09-12 LAB
BACTERIA #/AREA URNS HPF: NORMAL /HPF
BILIRUB SERPL-MCNC: ABNORMAL MG/DL
BLOOD URINE, POC: ABNORMAL
CLARITY, POC UA: CLEAR
COLOR, POC UA: YELLOW
GLUCOSE UR QL STRIP: ABNORMAL
KETONES UR QL STRIP: ABNORMAL
LEUKOCYTE ESTERASE URINE, POC: ABNORMAL
MICROSCOPIC COMMENT: NORMAL
NITRITE, POC UA: ABNORMAL
PH, POC UA: 7
POC RESIDUAL URINE VOLUME: 59 ML (ref 0–100)
PROTEIN, POC: ABNORMAL
RBC #/AREA URNS HPF: 0 /HPF (ref 0–4)
SPECIFIC GRAVITY, POC UA: 1.01
SQUAMOUS #/AREA URNS HPF: 0 /HPF
UROBILINOGEN, POC UA: 0.2
WBC #/AREA URNS HPF: 0 /HPF (ref 0–5)

## 2022-09-12 PROCEDURE — 3066F NEPHROPATHY DOC TX: CPT | Mod: CPTII,S$GLB,, | Performed by: UROLOGY

## 2022-09-12 PROCEDURE — 1101F PR PT FALLS ASSESS DOC 0-1 FALLS W/OUT INJ PAST YR: ICD-10-PCS | Mod: CPTII,S$GLB,, | Performed by: UROLOGY

## 2022-09-12 PROCEDURE — 51798 POCT BLADDER SCAN: ICD-10-PCS | Mod: S$GLB,,, | Performed by: UROLOGY

## 2022-09-12 PROCEDURE — 99999 PR PBB SHADOW E&M-EST. PATIENT-LVL IV: ICD-10-PCS | Mod: PBBFAC,,, | Performed by: UROLOGY

## 2022-09-12 PROCEDURE — 3008F PR BODY MASS INDEX (BMI) DOCUMENTED: ICD-10-PCS | Mod: CPTII,S$GLB,, | Performed by: UROLOGY

## 2022-09-12 PROCEDURE — 3061F NEG MICROALBUMINURIA REV: CPT | Mod: CPTII,S$GLB,, | Performed by: UROLOGY

## 2022-09-12 PROCEDURE — 3075F PR MOST RECENT SYSTOLIC BLOOD PRESS GE 130-139MM HG: ICD-10-PCS | Mod: CPTII,S$GLB,, | Performed by: UROLOGY

## 2022-09-12 PROCEDURE — 3061F PR NEG MICROALBUMINURIA RESULT DOCUMENTED/REVIEW: ICD-10-PCS | Mod: CPTII,S$GLB,, | Performed by: UROLOGY

## 2022-09-12 PROCEDURE — 99204 OFFICE O/P NEW MOD 45 MIN: CPT | Mod: S$GLB,,, | Performed by: UROLOGY

## 2022-09-12 PROCEDURE — 1159F PR MEDICATION LIST DOCUMENTED IN MEDICAL RECORD: ICD-10-PCS | Mod: CPTII,S$GLB,, | Performed by: UROLOGY

## 2022-09-12 PROCEDURE — 81002 URINALYSIS NONAUTO W/O SCOPE: CPT | Mod: S$GLB,,, | Performed by: UROLOGY

## 2022-09-12 PROCEDURE — 3078F PR MOST RECENT DIASTOLIC BLOOD PRESSURE < 80 MM HG: ICD-10-PCS | Mod: CPTII,S$GLB,, | Performed by: UROLOGY

## 2022-09-12 PROCEDURE — 1101F PT FALLS ASSESS-DOCD LE1/YR: CPT | Mod: CPTII,S$GLB,, | Performed by: UROLOGY

## 2022-09-12 PROCEDURE — 1126F AMNT PAIN NOTED NONE PRSNT: CPT | Mod: CPTII,S$GLB,, | Performed by: UROLOGY

## 2022-09-12 PROCEDURE — 1160F PR REVIEW ALL MEDS BY PRESCRIBER/CLIN PHARMACIST DOCUMENTED: ICD-10-PCS | Mod: CPTII,S$GLB,, | Performed by: UROLOGY

## 2022-09-12 PROCEDURE — 1159F MED LIST DOCD IN RCRD: CPT | Mod: CPTII,S$GLB,, | Performed by: UROLOGY

## 2022-09-12 PROCEDURE — 3044F HG A1C LEVEL LT 7.0%: CPT | Mod: CPTII,S$GLB,, | Performed by: UROLOGY

## 2022-09-12 PROCEDURE — 3288F PR FALLS RISK ASSESSMENT DOCUMENTED: ICD-10-PCS | Mod: CPTII,S$GLB,, | Performed by: UROLOGY

## 2022-09-12 PROCEDURE — 3078F DIAST BP <80 MM HG: CPT | Mod: CPTII,S$GLB,, | Performed by: UROLOGY

## 2022-09-12 PROCEDURE — 81000 URINALYSIS NONAUTO W/SCOPE: CPT | Performed by: UROLOGY

## 2022-09-12 PROCEDURE — 81002 POCT URINE DIPSTICK WITHOUT MICROSCOPE: ICD-10-PCS | Mod: S$GLB,,, | Performed by: UROLOGY

## 2022-09-12 PROCEDURE — 3044F PR MOST RECENT HEMOGLOBIN A1C LEVEL <7.0%: ICD-10-PCS | Mod: CPTII,S$GLB,, | Performed by: UROLOGY

## 2022-09-12 PROCEDURE — 1160F RVW MEDS BY RX/DR IN RCRD: CPT | Mod: CPTII,S$GLB,, | Performed by: UROLOGY

## 2022-09-12 PROCEDURE — 99204 PR OFFICE/OUTPT VISIT, NEW, LEVL IV, 45-59 MIN: ICD-10-PCS | Mod: S$GLB,,, | Performed by: UROLOGY

## 2022-09-12 PROCEDURE — 3288F FALL RISK ASSESSMENT DOCD: CPT | Mod: CPTII,S$GLB,, | Performed by: UROLOGY

## 2022-09-12 PROCEDURE — 3075F SYST BP GE 130 - 139MM HG: CPT | Mod: CPTII,S$GLB,, | Performed by: UROLOGY

## 2022-09-12 PROCEDURE — 1126F PR PAIN SEVERITY QUANTIFIED, NO PAIN PRESENT: ICD-10-PCS | Mod: CPTII,S$GLB,, | Performed by: UROLOGY

## 2022-09-12 PROCEDURE — 3008F BODY MASS INDEX DOCD: CPT | Mod: CPTII,S$GLB,, | Performed by: UROLOGY

## 2022-09-12 PROCEDURE — 99999 PR PBB SHADOW E&M-EST. PATIENT-LVL IV: CPT | Mod: PBBFAC,,, | Performed by: UROLOGY

## 2022-09-12 PROCEDURE — 51798 US URINE CAPACITY MEASURE: CPT | Mod: S$GLB,,, | Performed by: UROLOGY

## 2022-09-12 PROCEDURE — 3066F PR DOCUMENTATION OF TREATMENT FOR NEPHROPATHY: ICD-10-PCS | Mod: CPTII,S$GLB,, | Performed by: UROLOGY

## 2022-09-12 RX ORDER — TAMSULOSIN HYDROCHLORIDE 0.4 MG/1
0.4 CAPSULE ORAL NIGHTLY
Qty: 30 CAPSULE | Refills: 1 | Status: ON HOLD | OUTPATIENT
Start: 2022-09-12 | End: 2022-10-11 | Stop reason: SDUPTHER

## 2022-09-12 NOTE — PROGRESS NOTES
[facility] New Patient/H&P:     Sukumar Tay is a 72 y.o. male who presents for bph evaluation    Followed by Dr Rincon for primary care for T2DM, HLD, last seen last week noting - Has luts sx due to prostate enlargement. Has frequency, incomplete emptying, dribbling and weak stream worsening over time. Type 2 dm with retinopathy. A1c 5.8. GFR continues to decline in last year.    PSA history on review:    07/27/17 06/13/18 08/20/20 09/01/22    PSA, Screen 0.00 - 4.00  0.58 0.48 0.43 0.71     AUA SS:  25/5 (5: Emptying, frequency, urgency, weak stream; 2: Straining, sleeping; 1: Intermittency)  LUTS have been progressive over the last 4 years.  His obstructive symptoms such as weak stream and straining and intermittency started before it progressed urgency.  He does have significant urinary urgency now to the point where he feels like he may not be able to hold it but always can.  No urge incontinence.  He does however have significant bothersome postvoid dribbling sometimes of the 2nd stream after urinating.  This is why he feels he does not empty his bladder.  Postvoid residual today by bladder scan is 59 cc.  Urinalysis dipstick is negative except for trace blood.  He denies any gross blood in the urine, urinary tract infections, dysuria.  He has no known family history of prostate cancer.  His PSA has been normal as above.  He has never been on any genitourinary or urologic medications.  Last point of care with urologist was in the 90s for a vasectomy.  Medication averse  Drinking a lot of water 60 oz. Occ tea or coke with meals.  No coffee    Past Medical History:   Diagnosis Date    Dysuria     H. pylori infection     Squamous cell carcinoma of skin        No past surgical history on file. Just vasectomy    Family History   Problem Relation Age of Onset    Diabetes Neg Hx     Melanoma Neg Hx     Psoriasis Neg Hx     Lupus Neg Hx     Eczema Neg Hx        Social History     Socioeconomic History     "Marital status:    Tobacco Use    Smoking status: Never    Smokeless tobacco: Never   Substance and Sexual Activity    Alcohol use: No     Alcohol/week: 0.0 standard drinks    Drug use: No       Review of patient's allergies indicates:   Allergen Reactions    Bactrim [sulfamethoxazole-trimethoprim]     Sulfa (sulfonamide antibiotics)        Medications Reviewed: see MAR    Focused Physical Exam    Vitals:    09/12/22 1143   BP: 132/64   Pulse: (!) 54   Resp: 18     Body mass index is 20.9 kg/m². Weight: 53.5 kg (118 lb) Height: 5' 3" (160 cm)       Abdomen: Soft, non-tender, nondistended, no CVA tenderness  :  deferred to time of cysto/trus      LABS:    Recent Results (from the past 336 hour(s))   CBC Auto Differential    Collection Time: 09/01/22  7:13 AM   Result Value Ref Range    WBC 5.91 3.90 - 12.70 K/uL    RBC 4.33 (L) 4.60 - 6.20 M/uL    Hemoglobin 13.6 (L) 14.0 - 18.0 g/dL    Hematocrit 42.2 40.0 - 54.0 %    MCV 98 82 - 98 fL    MCH 31.4 (H) 27.0 - 31.0 pg    MCHC 32.2 32.0 - 36.0 g/dL    RDW 12.3 11.5 - 14.5 %    Platelets 278 150 - 450 K/uL    MPV 9.2 9.2 - 12.9 fL    Immature Granulocytes 0.2 0.0 - 0.5 %    Gran # (ANC) 3.5 1.8 - 7.7 K/uL    Immature Grans (Abs) 0.01 0.00 - 0.04 K/uL    Lymph # 1.7 1.0 - 4.8 K/uL    Mono # 0.6 0.3 - 1.0 K/uL    Eos # 0.1 0.0 - 0.5 K/uL    Baso # 0.03 0.00 - 0.20 K/uL    nRBC 0 0 /100 WBC    Gran % 59.4 38.0 - 73.0 %    Lymph % 27.9 18.0 - 48.0 %    Mono % 10.3 4.0 - 15.0 %    Eosinophil % 1.7 0.0 - 8.0 %    Basophil % 0.5 0.0 - 1.9 %    Differential Method Automated    Comprehensive Metabolic Panel    Collection Time: 09/01/22  7:13 AM   Result Value Ref Range    Sodium 138 136 - 145 mmol/L    Potassium 5.6 (H) 3.5 - 5.1 mmol/L    Chloride 101 95 - 110 mmol/L    CO2 27 23 - 29 mmol/L    Glucose 105 70 - 110 mg/dL    BUN 27 (H) 8 - 23 mg/dL    Creatinine 1.3 0.5 - 1.4 mg/dL    Calcium 9.6 8.7 - 10.5 mg/dL    Total Protein 7.3 6.0 - 8.4 g/dL    Albumin 4.1 3.5 - " 5.2 g/dL    Total Bilirubin 0.8 0.1 - 1.0 mg/dL    Alkaline Phosphatase 51 (L) 55 - 135 U/L    AST 29 10 - 40 U/L    ALT 20 10 - 44 U/L    Anion Gap 10 8 - 16 mmol/L    eGFR 58.7 (A) >60 mL/min/1.73 m^2   Hemoglobin A1C    Collection Time: 09/01/22  7:13 AM   Result Value Ref Range    Hemoglobin A1C 5.8 (H) 4.0 - 5.6 %    Estimated Avg Glucose 120 68 - 131 mg/dL   Lipid Panel    Collection Time: 09/01/22  7:13 AM   Result Value Ref Range    Cholesterol 125 120 - 199 mg/dL    Triglycerides 53 30 - 150 mg/dL    HDL 41 40 - 75 mg/dL    LDL Cholesterol 73.4 63.0 - 159.0 mg/dL    HDL/Cholesterol Ratio 32.8 20.0 - 50.0 %    Total Cholesterol/HDL Ratio 3.0 2.0 - 5.0    Non-HDL Cholesterol 84 mg/dL   PSA, Screening    Collection Time: 09/01/22  7:13 AM   Result Value Ref Range    PSA, Screen 0.71 0.00 - 4.00 ng/mL   Microalbumin/Creatinine Ratio, Urine    Collection Time: 09/01/22  8:34 AM   Result Value Ref Range    Microalbumin, Urine <5.0 ug/mL    Creatinine, Urine 59.0 23.0 - 375.0 mg/dL    Microalb/Creat Ratio Unable to calculate 0.0 - 30.0 ug/mg   POCT URINE DIPSTICK WITHOUT MICROSCOPE    Collection Time: 09/12/22 11:59 AM   Result Value Ref Range    Color, UA Yellow     pH, UA 7     WBC, UA neg     Nitrite, UA neg     Protein, POC neg     Glucose, UA neg     Ketones, UA neg     Urobilinogen, UA 0.2     Bilirubin, POC neg     Blood, UA trace     Clarity, UA Clear     Spec Grav UA 1.015    POCT Bladder Scan    Collection Time: 09/12/22 11:59 AM   Result Value Ref Range    POC Residual Urine Volume 59 0 - 100 mL         Assessment/Diagnosis:    1. BPH with obstruction/lower urinary tract symptoms  POCT URINE DIPSTICK WITHOUT MICROSCOPE    POCT Bladder Scan      2. Microhematuria  Urinalysis Microscopic          Plans:  Reviewed primary care notes labs workup evaluation, including his PSA history noting it to be normal and stable.  We did review the natural history of BPH with obstruction and progressive lower urinary  tract symptoms and prostate growth with aging.  We also discussed the progression from obstructive symptoms to irritative symptoms such as urgency and frequency with longer impacts of obstruction at the level of the bladder.  He has friends who have had BPH interventions and he is generally medication averse.  He is on very few medications, just metformin for his diabetes and some cholesterol medication and prefers not to be on any further medications and would prefer definitive management of prostate obstruction if present.  We did discuss in the short term pending further evaluation a trial of alpha blockers, which after discussing reasons to do so, he was agreeable to proceed in the short term, noting that on time of lower tract workup to discuss anatomic considerations, can also discuss medication responses in which symptom profile was affected to help guide further recommendations for intervention in the future to eliminate the need for medical therapy.  We did discuss lower tract evaluation with cystoscopy and prostate ultrasound guided volumetric measurement of prostate to help guide further recommendations for BPH intervention.  As his PSA history has been stable and normal and he has no family history of prostate cancer, deferred digital rectal exam to time of prostate ultrasound.  All questions answered about this diagnostic workup and this was scheduled at the [facility] on 10/11/22 as he will take Flomax 0.4 mg nightly in the interim after we did discuss common side effects, and also provided conservative recommendations for urgency frequency in writing to utilize in combination with medical therapy pending further workup.  As well, his urinalysis dipstick today demonstrates trace blood.  He has never seen gross blood in the urine is not on blood thinners and does not have significant risk factors so will send a formal urinalysis microscopic exam to rule in or out true micro hematuria.  We are already planning  cystoscopy, so greater than 5 red blood cells is present on high-powered microscope field, would get upper tract imaging prior to his cystoscopy.  I expect that it be negative.    Level of Medical Decision Making  [ _ ]  Low: 2 minor/1 stable chronic/1 acute uncomplicated --- review record/result/order test x2  [ X]  Mod: 1 chronic exac/2stable chronic/1 acute comp --- review record/result/order test x3 OR independent interp of outside test OR discuss mgmt of outside ordered test --- start drug therapy/plan minor surgery   [ _ ]  High: chronic with exacerbation/progression or trtmnt side effect vs acute/chronic w/ life/body threat ---  (2/3) review record/result/order test x3 OR independent interp of ouutside test OR discuss mgmt of outside ordered test --- drug with monitoring for toxicity, plan major surgery, hospitalize, deescalate/withdraw care bc of prognosis

## 2022-09-12 NOTE — H&P (VIEW-ONLY)
[facility] New Patient/H&P:     Sukumar Tay is a 72 y.o. male who presents for bph evaluation    Followed by Dr Rincon for primary care for T2DM, HLD, last seen last week noting - Has luts sx due to prostate enlargement. Has frequency, incomplete emptying, dribbling and weak stream worsening over time. Type 2 dm with retinopathy. A1c 5.8. GFR continues to decline in last year.    PSA history on review:    07/27/17 06/13/18 08/20/20 09/01/22    PSA, Screen 0.00 - 4.00  0.58 0.48 0.43 0.71     AUA SS:  25/5 (5: Emptying, frequency, urgency, weak stream; 2: Straining, sleeping; 1: Intermittency)  LUTS have been progressive over the last 4 years.  His obstructive symptoms such as weak stream and straining and intermittency started before it progressed urgency.  He does have significant urinary urgency now to the point where he feels like he may not be able to hold it but always can.  No urge incontinence.  He does however have significant bothersome postvoid dribbling sometimes of the 2nd stream after urinating.  This is why he feels he does not empty his bladder.  Postvoid residual today by bladder scan is 59 cc.  Urinalysis dipstick is negative except for trace blood.  He denies any gross blood in the urine, urinary tract infections, dysuria.  He has no known family history of prostate cancer.  His PSA has been normal as above.  He has never been on any genitourinary or urologic medications.  Last point of care with urologist was in the 90s for a vasectomy.  Medication averse  Drinking a lot of water 60 oz. Occ tea or coke with meals.  No coffee    Past Medical History:   Diagnosis Date    Dysuria     H. pylori infection     Squamous cell carcinoma of skin        No past surgical history on file. Just vasectomy    Family History   Problem Relation Age of Onset    Diabetes Neg Hx     Melanoma Neg Hx     Psoriasis Neg Hx     Lupus Neg Hx     Eczema Neg Hx        Social History     Socioeconomic History     "Marital status:    Tobacco Use    Smoking status: Never    Smokeless tobacco: Never   Substance and Sexual Activity    Alcohol use: No     Alcohol/week: 0.0 standard drinks    Drug use: No       Review of patient's allergies indicates:   Allergen Reactions    Bactrim [sulfamethoxazole-trimethoprim]     Sulfa (sulfonamide antibiotics)        Medications Reviewed: see MAR    Focused Physical Exam    Vitals:    09/12/22 1143   BP: 132/64   Pulse: (!) 54   Resp: 18     Body mass index is 20.9 kg/m². Weight: 53.5 kg (118 lb) Height: 5' 3" (160 cm)       Abdomen: Soft, non-tender, nondistended, no CVA tenderness  :  deferred to time of cysto/trus      LABS:    Recent Results (from the past 336 hour(s))   CBC Auto Differential    Collection Time: 09/01/22  7:13 AM   Result Value Ref Range    WBC 5.91 3.90 - 12.70 K/uL    RBC 4.33 (L) 4.60 - 6.20 M/uL    Hemoglobin 13.6 (L) 14.0 - 18.0 g/dL    Hematocrit 42.2 40.0 - 54.0 %    MCV 98 82 - 98 fL    MCH 31.4 (H) 27.0 - 31.0 pg    MCHC 32.2 32.0 - 36.0 g/dL    RDW 12.3 11.5 - 14.5 %    Platelets 278 150 - 450 K/uL    MPV 9.2 9.2 - 12.9 fL    Immature Granulocytes 0.2 0.0 - 0.5 %    Gran # (ANC) 3.5 1.8 - 7.7 K/uL    Immature Grans (Abs) 0.01 0.00 - 0.04 K/uL    Lymph # 1.7 1.0 - 4.8 K/uL    Mono # 0.6 0.3 - 1.0 K/uL    Eos # 0.1 0.0 - 0.5 K/uL    Baso # 0.03 0.00 - 0.20 K/uL    nRBC 0 0 /100 WBC    Gran % 59.4 38.0 - 73.0 %    Lymph % 27.9 18.0 - 48.0 %    Mono % 10.3 4.0 - 15.0 %    Eosinophil % 1.7 0.0 - 8.0 %    Basophil % 0.5 0.0 - 1.9 %    Differential Method Automated    Comprehensive Metabolic Panel    Collection Time: 09/01/22  7:13 AM   Result Value Ref Range    Sodium 138 136 - 145 mmol/L    Potassium 5.6 (H) 3.5 - 5.1 mmol/L    Chloride 101 95 - 110 mmol/L    CO2 27 23 - 29 mmol/L    Glucose 105 70 - 110 mg/dL    BUN 27 (H) 8 - 23 mg/dL    Creatinine 1.3 0.5 - 1.4 mg/dL    Calcium 9.6 8.7 - 10.5 mg/dL    Total Protein 7.3 6.0 - 8.4 g/dL    Albumin 4.1 3.5 - " 5.2 g/dL    Total Bilirubin 0.8 0.1 - 1.0 mg/dL    Alkaline Phosphatase 51 (L) 55 - 135 U/L    AST 29 10 - 40 U/L    ALT 20 10 - 44 U/L    Anion Gap 10 8 - 16 mmol/L    eGFR 58.7 (A) >60 mL/min/1.73 m^2   Hemoglobin A1C    Collection Time: 09/01/22  7:13 AM   Result Value Ref Range    Hemoglobin A1C 5.8 (H) 4.0 - 5.6 %    Estimated Avg Glucose 120 68 - 131 mg/dL   Lipid Panel    Collection Time: 09/01/22  7:13 AM   Result Value Ref Range    Cholesterol 125 120 - 199 mg/dL    Triglycerides 53 30 - 150 mg/dL    HDL 41 40 - 75 mg/dL    LDL Cholesterol 73.4 63.0 - 159.0 mg/dL    HDL/Cholesterol Ratio 32.8 20.0 - 50.0 %    Total Cholesterol/HDL Ratio 3.0 2.0 - 5.0    Non-HDL Cholesterol 84 mg/dL   PSA, Screening    Collection Time: 09/01/22  7:13 AM   Result Value Ref Range    PSA, Screen 0.71 0.00 - 4.00 ng/mL   Microalbumin/Creatinine Ratio, Urine    Collection Time: 09/01/22  8:34 AM   Result Value Ref Range    Microalbumin, Urine <5.0 ug/mL    Creatinine, Urine 59.0 23.0 - 375.0 mg/dL    Microalb/Creat Ratio Unable to calculate 0.0 - 30.0 ug/mg   POCT URINE DIPSTICK WITHOUT MICROSCOPE    Collection Time: 09/12/22 11:59 AM   Result Value Ref Range    Color, UA Yellow     pH, UA 7     WBC, UA neg     Nitrite, UA neg     Protein, POC neg     Glucose, UA neg     Ketones, UA neg     Urobilinogen, UA 0.2     Bilirubin, POC neg     Blood, UA trace     Clarity, UA Clear     Spec Grav UA 1.015    POCT Bladder Scan    Collection Time: 09/12/22 11:59 AM   Result Value Ref Range    POC Residual Urine Volume 59 0 - 100 mL         Assessment/Diagnosis:    1. BPH with obstruction/lower urinary tract symptoms  POCT URINE DIPSTICK WITHOUT MICROSCOPE    POCT Bladder Scan      2. Microhematuria  Urinalysis Microscopic          Plans:  Reviewed primary care notes labs workup evaluation, including his PSA history noting it to be normal and stable.  We did review the natural history of BPH with obstruction and progressive lower urinary  tract symptoms and prostate growth with aging.  We also discussed the progression from obstructive symptoms to irritative symptoms such as urgency and frequency with longer impacts of obstruction at the level of the bladder.  He has friends who have had BPH interventions and he is generally medication averse.  He is on very few medications, just metformin for his diabetes and some cholesterol medication and prefers not to be on any further medications and would prefer definitive management of prostate obstruction if present.  We did discuss in the short term pending further evaluation a trial of alpha blockers, which after discussing reasons to do so, he was agreeable to proceed in the short term, noting that on time of lower tract workup to discuss anatomic considerations, can also discuss medication responses in which symptom profile was affected to help guide further recommendations for intervention in the future to eliminate the need for medical therapy.  We did discuss lower tract evaluation with cystoscopy and prostate ultrasound guided volumetric measurement of prostate to help guide further recommendations for BPH intervention.  As his PSA history has been stable and normal and he has no family history of prostate cancer, deferred digital rectal exam to time of prostate ultrasound.  All questions answered about this diagnostic workup and this was scheduled at the [facility] on 10/11/22 as he will take Flomax 0.4 mg nightly in the interim after we did discuss common side effects, and also provided conservative recommendations for urgency frequency in writing to utilize in combination with medical therapy pending further workup.  As well, his urinalysis dipstick today demonstrates trace blood.  He has never seen gross blood in the urine is not on blood thinners and does not have significant risk factors so will send a formal urinalysis microscopic exam to rule in or out true micro hematuria.  We are already planning  cystoscopy, so greater than 5 red blood cells is present on high-powered microscope field, would get upper tract imaging prior to his cystoscopy.  I expect that it be negative.    Level of Medical Decision Making  [ _ ]  Low: 2 minor/1 stable chronic/1 acute uncomplicated --- review record/result/order test x2  [ X]  Mod: 1 chronic exac/2stable chronic/1 acute comp --- review record/result/order test x3 OR independent interp of outside test OR discuss mgmt of outside ordered test --- start drug therapy/plan minor surgery   [ _ ]  High: chronic with exacerbation/progression or trtmnt side effect vs acute/chronic w/ life/body threat ---  (2/3) review record/result/order test x3 OR independent interp of ouutside test OR discuss mgmt of outside ordered test --- drug with monitoring for toxicity, plan major surgery, hospitalize, deescalate/withdraw care bc of prognosis

## 2022-09-12 NOTE — PATIENT INSTRUCTIONS
Discussed conservative measures to control urgency and frequency including   1. Avoiding/minimizing bladder irritants (see below), especially in afternoon and evening hours    Discussed bladder irritants include coffe (even decaf), tea, alcohol, soda, spicy foods, acidic juices (orange, tomato), vinegar, and artificial sweeteners/sugary beverages.    2. timed voiding - empty on a schedule (approx ~2-3 hours) in spite of need to urinate, to get ahead of urge    3. dont postponing voiding - dont hold it on purpose     4. bowel regimen as distended bowel has extrinsic compressive effect on bladder.   - any or all of the following in any combination, titrate to soft daily bowel movement without pushing or straining  - colace/stool softener capsule - once to twice daily  - miralax - 1 capful daily to start, can increase to 2x daily (or decrease to 1/2 cap daily)  - increase dietary fibery  - fiber supplements, such as metamucil  - prunes, prune juice    5. INCREASE water intake    6. Stop fluids 2 hours before bed, and urinate just before bed    7. Flomax 0.4 mg nightly in the shortterm pending further evaluation to assess response.     Tuesday afternoon 10/11/22, cystoscopy camera into bladder, and prostate ultrasound for size, at the [address] in the afternoon.  Will get call day prior with an afternoon arrival time.  No prep for fasting is needed.

## 2022-09-27 LAB
LEFT EYE DM RETINOPATHY: POSITIVE
RIGHT EYE DM RETINOPATHY: POSITIVE

## 2022-10-10 NOTE — DISCHARGE INSTRUCTIONS
After the procedure    Drink plenty of fluids.  You may have burning or light bleeding when you urinate--this is normal.  Medications may be prescribed to ease any discomfort or prevent infection. Take these as directed.  Call your doctor if you have heavy bleeding or blood clots, burning that lasts more than a day, a fever over 100°F  (38° C), or trouble urinating.    After Surgery:  Always be aware that any surgery can cause these symptoms:    Pain- Medication can be prescribed for pain to decrease your pain but may not completely take your pain away.  Over the Counter pain medicine my be enough and you can always use Ice and rest to help ease pain.    Bleeding- a little bleeding after a surgery is usually within normal.  If there is a lot of blood you need to notify your MD.  Emergency treatments of bleeding are cold application, elevation of the bleeding site and compression.    Infection- Infection after surgery is NOT a normal occurrence.  Signs of infection are fever, swelling, hot to touch the incision.  If this occurs notify your MD immediately.    Nausea- this can be common after a surgery especially if you have had anesthesia medicine or are taking pain medicine.  Staying on clear liquids, bland foods, gingerale, or over the counter anti nausea medicines can help.  If you vomit more than once, notify your MD.  Anti Nausea medicines can be prescribed.       Transrectal Ultrasound   A transrectal ultrasound is an imaging test. It uses sound waves to create pictures of a mans prostate gland to determine its size.Your prostate gland is in front of your rectum and if too large may become inflamed and impede the flow of urine. For this test, a special probe (transducer) is placed directly into your rectum.     Before leaving, you may need to wait for a short time while the images are reviewed. In most cases, you can go back to your normal routine after the test.       © 8720-8286 [employer] [address] [place] [address]. All rights reserved. This information is not intended as a substitute for professional medical care. Always follow your healthcare professional's instructions.

## 2022-10-11 ENCOUNTER — HOSPITAL ENCOUNTER (OUTPATIENT)
Facility: AMBULARY SURGERY CENTER | Age: 72
Discharge: HOME | End: 2022-10-11
Attending: UROLOGY | Admitting: UROLOGY
Payer: MEDICARE

## 2022-10-11 DIAGNOSIS — N40.1 BPH WITH OBSTRUCTION/LOWER URINARY TRACT SYMPTOMS: ICD-10-CM

## 2022-10-11 DIAGNOSIS — N13.8 BPH WITH OBSTRUCTION/LOWER URINARY TRACT SYMPTOMS: ICD-10-CM

## 2022-10-11 PROCEDURE — 76872 US TRANSRECTAL: CPT | Performed by: UROLOGY

## 2022-10-11 PROCEDURE — 52000 PR CYSTOURETHROSCOPY: ICD-10-PCS | Mod: ,,, | Performed by: UROLOGY

## 2022-10-11 PROCEDURE — 52000 CYSTOURETHROSCOPY: CPT | Mod: ,,, | Performed by: UROLOGY

## 2022-10-11 PROCEDURE — 76872 PR US TRANSRECTAL: ICD-10-PCS | Mod: 26,,, | Performed by: UROLOGY

## 2022-10-11 PROCEDURE — 76872 US TRANSRECTAL: CPT | Mod: 26,,, | Performed by: UROLOGY

## 2022-10-11 PROCEDURE — 52000 CYSTOURETHROSCOPY: CPT | Performed by: UROLOGY

## 2022-10-11 RX ORDER — WATER 1 ML/ML
IRRIGANT IRRIGATION
Status: DISCONTINUED | OUTPATIENT
Start: 2022-10-11 | End: 2022-10-11 | Stop reason: HOSPADM

## 2022-10-11 RX ORDER — TAMSULOSIN HYDROCHLORIDE 0.4 MG/1
0.8 CAPSULE ORAL NIGHTLY
Qty: 60 CAPSULE | Refills: 1 | Status: SHIPPED | OUTPATIENT
Start: 2022-10-11 | End: 2023-03-07 | Stop reason: ALTCHOICE

## 2022-10-11 RX ORDER — LIDOCAINE HYDROCHLORIDE 20 MG/ML
JELLY TOPICAL
Status: DISCONTINUED | OUTPATIENT
Start: 2022-10-11 | End: 2022-10-11 | Stop reason: HOSPADM

## 2022-10-11 RX ORDER — CIPROFLOXACIN 500 MG/1
500 TABLET ORAL 2 TIMES DAILY
Qty: 4 TABLET | Refills: 0 | Status: SHIPPED | OUTPATIENT
Start: 2022-10-11 | End: 2023-03-07 | Stop reason: ALTCHOICE

## 2022-10-11 NOTE — INTERVAL H&P NOTE
The patient has been examined and the H&P has been reviewed:    Acceptable for asc    There are no hospital problems to display for this patient.

## 2022-10-12 VITALS
OXYGEN SATURATION: 99 % | HEIGHT: 63 IN | WEIGHT: 118 LBS | HEART RATE: 53 BPM | SYSTOLIC BLOOD PRESSURE: 127 MMHG | DIASTOLIC BLOOD PRESSURE: 59 MMHG | TEMPERATURE: 99 F | RESPIRATION RATE: 17 BRPM | BODY MASS INDEX: 20.91 KG/M2

## 2022-10-12 DIAGNOSIS — N13.8 BPH WITH OBSTRUCTION/LOWER URINARY TRACT SYMPTOMS: Primary | ICD-10-CM

## 2022-10-12 DIAGNOSIS — N40.1 BPH WITH OBSTRUCTION/LOWER URINARY TRACT SYMPTOMS: Primary | ICD-10-CM

## 2022-10-14 NOTE — OP NOTE
[facility]rology Operative/Brief Discharge Note     Date: 8/9/22     Staff Surgeon: Jorge Love MD     Pre-Op Diagnosis:   BPH with LUTS     Post-Op Diagnosis:   same     Procedure(s) Performed:   Cystoscopy, flexible  Transrectal ultrasound with volumetric measurement of prostate     Specimen(s): none     Anesthesia: local, 2% xylocaine jelly urojet     Findings:   Volume 23.5 cm3 (W 41.8mm, H 24.1 mm, L 44.8mm), no med lobe, significant PVR (catheterized PVR was 300cc)  Cysto: significant anterolateral lobe obstruction of prostatic urethra with only mild bladder neck elevation. Mild diffuse trabeculations more prominent at bladder base. No median lobe or intravesical extension. Impression of stool/colon projecting at posterior bladder      Estimated Blood Loss: none     Drains: none     Complications: none     Indications for procedure:  71 yo M with long history of BPH/LUTS progressive over last 4 years, weak stream dribbline subjective incomplete emptying. Recent office eval with AUASS 25/5, though pvr in office was 59cc. No change with flomax. Presents for cystoscopic evaluation to guide recommendations for intervention.     Procedure in detail:  After informed consent, the patient was placed in left lateral decubitus position. Transrectal ultrasound probe was passed into the rectum and the prostate was visualized on the screen.  Three-dimensional measurements taken as above with reported prostate volume as documented.  Pertinent ultrasound findings noted above, with absence of median lobe, and No ultrasonic abnormalities of prostate were visualized. Transverse and saggital image captured.  The ultrasound probe was removed     He was then placed in supine position and prepped and drapped in standard cystoscopic fashion and 2% xylocaine jelly was instilled into the urethra. First 16fr coude in out cath to drain bladder given noted large residual on trus. Cathd volume of 300cc. A flexible cystoscope was  passed into the bladder via the urethra.      Anterior urethra normal without narrowing. The prostatic urethra demonstrated significant obstruction as described above in findings, with lateral lobe obstruction present with the absence of median lobe, as confirmed on retroflexion      Bladder otherwise systematically inspected and no mucosal lesions or tumors seen.  Bladder was also assessed for signs of chronic obstruction such as trabeculations, cellules, diverticulum, of which pertinent findings are noted above with extensive signs of chronic obstruction.  Bilateral ureteral orifices seen in orthotopic position on trigone bilaterally with clear efflux.       Patient tolerated the procedure well. No complications     Disposition:  We did discuss options for his symptomatic obstruction refractory to alpha blockers. No benefit of adding finasteride given gland size <40g. Discussed options for intervention such as urolift, rezum, tuip, turp. He is interested in proceeding with Urolift after discussion of all risks, benefits, and alternatives. Especially given gland size and anatomic proportions. Discussed stacking.      Procedure in detail discussed. Discussed risks including but not limited to hematuria, postop retention, pain, infection, injury to bladder or urethra, and worsening urgency, latent pelvic pain, no guarantee of symptomatic relief, prostate regrowth, need for future procedures. We did discuss the non-incidence of ejaculatory dysfunction, as well as the 1-2% retreatment rate in 5 year studies. Also discussed about 20% of people may need a catheter after (due to retention or hematuria) but not required if voiding postop, though tend to leave molina prophylactically overnight, which he is agreeable to.     Discussed that symptomatic relief may take longer to be fully appreciated, in the setting of longer standing obstruction, and period of symptomatic adjustment postop. Also discussed transient increases in  urgency frequency which may yield transient UUI in postop period.     All questions patient had answered and appropriate informed consent obtained.  Urolift in OR 11/3/22  Increase flomax to 0.8mg nightly interim given incomplete emptying        Discharge home today status post uncomplicated procedure as above  Diet - resume home diet  Follow up: urolift 11/3/22  Instructions:  Drink plenty of water, may see blood in urine, complete prophylactic antibiotics.  Hold asa/fish oil/bloodthinners 1 week prior to urolift   Meds:     Medication List        START taking these medications      ciprofloxacin HCl 500 MG tablet  Commonly known as: CIPRO  Take 1 tablet (500 mg total) by mouth 2 (two) times daily.            CHANGE how you take these medications      tamsulosin 0.4 mg Cap  Commonly known as: FLOMAX  Take 2 capsules (0.8 mg total) by mouth every evening.  What changed: how much to take            CONTINUE taking these medications      metFORMIN 500 MG tablet  Commonly known as: GLUCOPHAGE  TAKE 1 TABLET DAILY WITH BREAKFAST.     pravastatin 40 MG tablet  Commonly known as: PRAVACHOL  TAKE 1 TABLET (40 MG TOTAL) BY MOUTH EVERY EVENING.               Where to Get Your Medications        These medications were sent to [facility] [address] [place] [id number] [facility]  [address] [facility] [address]      Phone: [phone]   ciprofloxacin HCl 500 MG tablet  tamsulosin 0.4 mg Cap

## 2022-10-21 ENCOUNTER — HOSPITAL ENCOUNTER (OUTPATIENT)
Dept: PREADMISSION TESTING | Facility: HOSPITAL | Age: 72
Discharge: HOME | End: 2022-10-21
Attending: UROLOGY
Payer: MEDICARE

## 2022-10-21 DIAGNOSIS — N13.8 BPH WITH OBSTRUCTION/LOWER URINARY TRACT SYMPTOMS: ICD-10-CM

## 2022-10-21 DIAGNOSIS — N40.1 BPH WITH OBSTRUCTION/LOWER URINARY TRACT SYMPTOMS: ICD-10-CM

## 2022-10-21 DIAGNOSIS — Z01.810 PREOP CARDIOVASCULAR EXAM: ICD-10-CM

## 2022-10-21 PROCEDURE — 93010 EKG 12-LEAD: ICD-10-PCS | Mod: ,,, | Performed by: INTERNAL MEDICINE

## 2022-10-21 PROCEDURE — 93005 ELECTROCARDIOGRAM TRACING: CPT

## 2022-10-21 PROCEDURE — 93010 ELECTROCARDIOGRAM REPORT: CPT | Mod: ,,, | Performed by: INTERNAL MEDICINE

## 2022-10-21 PROCEDURE — 99900103 DSU ONLY-NO CHARGE-INITIAL HR (STAT)

## 2022-10-21 PROCEDURE — 99900104 DSU ONLY-NO CHARGE-EA ADD'L HR (STAT)

## 2022-10-21 NOTE — DISCHARGE INSTRUCTIONS
To confirm, Your doctor has instructed you that surgery is scheduled for: 11/3/22 with Dr. Love    Please report to [facility], [facility] the morning of surgery. You must check-in and receive a wristband before going to your procedure.    Pre-Op will call the afternoon prior to surgery between 1:00 and 6:00 PM with the final arrival time.  Phone number: [phone]    PLEASE NOTE:  The surgery schedule has many variables which may affect the time of your surgery case.  Family members should be available if your surgery time changes.  Plan to be here the day of your procedure between 4-6 hours.    MEDICATIONS:  TAKE ONLY THESE MEDICATIONS WITH A SMALL SIP OF WATER THE MORNING OF YOUR PROCEDURE:  NONE    NO METFORMIN MORNING OF SURGERY.      DO NOT TAKE THESE MEDICATIONS 5-7 DAYS PRIOR to your procedure or per your surgeon's request:   ASPIRIN, ALEVE, ADVIL, IBUPROFEN, FISH OIL VITAMIN E, HERBALS  (May take Tylenol)    ONLY if you are prescribed any types of blood thinners such as:  Aspirin, Coumadin, Plavix, Pradaxa, Xarelto, Aggrenox, Effient, Eliquis, Savasya, Brilinta, or any other, ask your surgeon whether you should stop taking them and how long before surgery you should stop.  You may also need to verify with the prescribing physician if it is ok to stop your medication.      INSTRUCTIONS IMPORTANT!!  Do not eat or drink anything between midnight and the time of your procedure- this includes gum, mints, and candy.  Do not smoke or drink alcoholic beverages 24 hours prior to your procedure.  Shower the night before AND the morning of your procedure with a Chlorhexidine wash such as Hibiclens or Dial antibacterial soap from the neck down.  Do not get it on your face or in your eyes.  You may use your own shampoo and face wash. This helps your skin to be as bacteria free as possible.    If you wear contact lenses, dentures, hearing aids or glasses, bring a container to put them in  during surgery and give to a family member for safe keeping.  Please leave all jewelry, piercing's and valuables at home.   DO NOT remove hair from the surgery site.  Do not shave the incision site unless you are given specific instructions to do so.    ONLY if you have been diagnosed with sleep apnea please bring your C-PAP machine.  ONLY if you wear home oxygen please bring your portable oxygen tank the day of your procedure.  ONLY if you have a history of OPEN HEART SURGERY you will need a clearance from your Cardiologist per Anesthesia.      ONLY for patients requiring bowel prep, written instructions will be given by your doctor's office.  ONLY if you have a neuro stimulator, please bring the controller with you the morning of surgery  ONLY if a type and screen test is needed before surgery, please return:  If your doctor has scheduled you for an overnight stay, bring a small overnight bag with any personal items you need.  Make arrangements in advance for transportation home by a responsible adult.  It is not safe to drive a vehicle during the 24 hours after anesthesia.      [facility] Visitor Policy    Effective September 26, 2022    [facility] will resume routine visitation for COVID-19 negative patients, including inpatients, outpatients, and procedural areas, in accordance with local campus procedures.    All [facility] facilities and properties are tobacco free.  Smoking is NOT allowed.   If you have any questions about these instructions, call Pre-Op Admit  Nursing at [phone] or the Pre-Op Day Surgery Unit at [phone].

## 2022-11-02 ENCOUNTER — ANESTHESIA EVENT (OUTPATIENT)
Dept: SURGERY | Facility: HOSPITAL | Age: 72
End: 2022-11-02
Payer: MEDICARE

## 2022-11-03 ENCOUNTER — HOSPITAL ENCOUNTER (OUTPATIENT)
Facility: HOSPITAL | Age: 72
Discharge: HOME | End: 2022-11-03
Attending: UROLOGY | Admitting: UROLOGY
Payer: MEDICARE

## 2022-11-03 ENCOUNTER — ANESTHESIA (OUTPATIENT)
Dept: SURGERY | Facility: HOSPITAL | Age: 72
End: 2022-11-03
Payer: MEDICARE

## 2022-11-03 DIAGNOSIS — N13.8 BPH WITH OBSTRUCTION/LOWER URINARY TRACT SYMPTOMS: ICD-10-CM

## 2022-11-03 DIAGNOSIS — N40.1 BPH WITH OBSTRUCTION/LOWER URINARY TRACT SYMPTOMS: ICD-10-CM

## 2022-11-03 PROCEDURE — 71000039 HC RECOVERY, EACH ADD'L HOUR: Performed by: UROLOGY

## 2022-11-03 PROCEDURE — 52442 PR CYSTO W/INSERT PERMANENT ADJ IMPLANT, EA ADDTL IMPLANT: ICD-10-PCS | Mod: ,,, | Performed by: UROLOGY

## 2022-11-03 PROCEDURE — 25000003 PHARM REV CODE 250: Performed by: ANESTHESIOLOGY

## 2022-11-03 PROCEDURE — 94799 UNLISTED PULMONARY SVC/PX: CPT

## 2022-11-03 PROCEDURE — 63600175 PHARM REV CODE 636 W HCPCS: Performed by: STUDENT IN AN ORGANIZED HEALTH CARE EDUCATION/TRAINING PROGRAM

## 2022-11-03 PROCEDURE — 71000033 HC RECOVERY, INTIAL HOUR: Performed by: UROLOGY

## 2022-11-03 PROCEDURE — D9220A PRA ANESTHESIA: ICD-10-PCS | Mod: ANES,,, | Performed by: ANESTHESIOLOGY

## 2022-11-03 PROCEDURE — 63600175 PHARM REV CODE 636 W HCPCS: Performed by: UROLOGY

## 2022-11-03 PROCEDURE — 52442 CYSTO INS TRNSPRSTC IMPLT EA: CPT | Mod: ,,, | Performed by: UROLOGY

## 2022-11-03 PROCEDURE — 52441 CYSTO INSJ TRNSPRSTC 1 IMPLT: CPT | Mod: ,,, | Performed by: UROLOGY

## 2022-11-03 PROCEDURE — 71000016 HC POSTOP RECOV ADDL HR: Performed by: UROLOGY

## 2022-11-03 PROCEDURE — 99900104 DSU ONLY-NO CHARGE-EA ADD'L HR (STAT): Performed by: UROLOGY

## 2022-11-03 PROCEDURE — 36000707: Performed by: UROLOGY

## 2022-11-03 PROCEDURE — 37000009 HC ANESTHESIA EA ADD 15 MINS: Performed by: UROLOGY

## 2022-11-03 PROCEDURE — 52441 PR CYSTO W/INSERT PERMANENT ADJ IMPLANT, SINGLE: ICD-10-PCS | Mod: ,,, | Performed by: UROLOGY

## 2022-11-03 PROCEDURE — 71000015 HC POSTOP RECOV 1ST HR: Performed by: UROLOGY

## 2022-11-03 PROCEDURE — 37000008 HC ANESTHESIA 1ST 15 MINUTES: Performed by: UROLOGY

## 2022-11-03 PROCEDURE — D9220A PRA ANESTHESIA: Mod: ANES,,, | Performed by: ANESTHESIOLOGY

## 2022-11-03 PROCEDURE — 36000706: Performed by: UROLOGY

## 2022-11-03 PROCEDURE — L8699 PROSTHETIC IMPLANT NOS: HCPCS | Performed by: UROLOGY

## 2022-11-03 PROCEDURE — D9220A PRA ANESTHESIA: ICD-10-PCS | Mod: CRNA,,, | Performed by: STUDENT IN AN ORGANIZED HEALTH CARE EDUCATION/TRAINING PROGRAM

## 2022-11-03 PROCEDURE — 99900103 DSU ONLY-NO CHARGE-INITIAL HR (STAT): Performed by: UROLOGY

## 2022-11-03 PROCEDURE — D9220A PRA ANESTHESIA: Mod: CRNA,,, | Performed by: STUDENT IN AN ORGANIZED HEALTH CARE EDUCATION/TRAINING PROGRAM

## 2022-11-03 PROCEDURE — 99900035 HC TECH TIME PER 15 MIN (STAT)

## 2022-11-03 RX ORDER — CEFAZOLIN SODIUM 2 G/50ML
2 SOLUTION INTRAVENOUS
Status: COMPLETED | OUTPATIENT
Start: 2022-11-03 | End: 2022-11-03

## 2022-11-03 RX ORDER — FENTANYL CITRATE 50 UG/ML
25 INJECTION, SOLUTION INTRAMUSCULAR; INTRAVENOUS EVERY 5 MIN PRN
Status: DISCONTINUED | OUTPATIENT
Start: 2022-11-03 | End: 2022-11-03 | Stop reason: HOSPADM

## 2022-11-03 RX ORDER — LIDOCAINE HYDROCHLORIDE 10 MG/ML
1 INJECTION, SOLUTION EPIDURAL; INFILTRATION; INTRACAUDAL; PERINEURAL ONCE
Status: DISCONTINUED | OUTPATIENT
Start: 2022-11-03 | End: 2022-11-03 | Stop reason: HOSPADM

## 2022-11-03 RX ORDER — ACETAMINOPHEN 10 MG/ML
INJECTION, SOLUTION INTRAVENOUS
Status: DISCONTINUED | OUTPATIENT
Start: 2022-11-03 | End: 2022-11-03

## 2022-11-03 RX ORDER — KETOROLAC TROMETHAMINE 30 MG/ML
INJECTION, SOLUTION INTRAMUSCULAR; INTRAVENOUS
Status: DISCONTINUED | OUTPATIENT
Start: 2022-11-03 | End: 2022-11-03

## 2022-11-03 RX ORDER — PHENYLEPHRINE HYDROCHLORIDE 10 MG/ML
INJECTION INTRAVENOUS
Status: DISCONTINUED | OUTPATIENT
Start: 2022-11-03 | End: 2022-11-03

## 2022-11-03 RX ORDER — CEFDINIR 300 MG/1
300 CAPSULE ORAL 2 TIMES DAILY
Qty: 10 CAPSULE | Refills: 0 | Status: SHIPPED | OUTPATIENT
Start: 2022-11-03 | End: 2022-11-08

## 2022-11-03 RX ORDER — ONDANSETRON 2 MG/ML
INJECTION INTRAMUSCULAR; INTRAVENOUS
Status: DISCONTINUED | OUTPATIENT
Start: 2022-11-03 | End: 2022-11-03

## 2022-11-03 RX ORDER — HYDROMORPHONE HYDROCHLORIDE 2 MG/ML
0.2 INJECTION, SOLUTION INTRAMUSCULAR; INTRAVENOUS; SUBCUTANEOUS EVERY 5 MIN PRN
Status: DISCONTINUED | OUTPATIENT
Start: 2022-11-03 | End: 2022-11-03 | Stop reason: HOSPADM

## 2022-11-03 RX ORDER — MIDAZOLAM HYDROCHLORIDE 1 MG/ML
INJECTION, SOLUTION INTRAMUSCULAR; INTRAVENOUS
Status: DISCONTINUED | OUTPATIENT
Start: 2022-11-03 | End: 2022-11-03

## 2022-11-03 RX ORDER — SODIUM CHLORIDE, SODIUM LACTATE, POTASSIUM CHLORIDE, CALCIUM CHLORIDE 600; 310; 30; 20 MG/100ML; MG/100ML; MG/100ML; MG/100ML
INJECTION, SOLUTION INTRAVENOUS CONTINUOUS
Status: DISCONTINUED | OUTPATIENT
Start: 2022-11-03 | End: 2022-11-03 | Stop reason: HOSPADM

## 2022-11-03 RX ORDER — PHENAZOPYRIDINE HYDROCHLORIDE 200 MG/1
200 TABLET, FILM COATED ORAL 3 TIMES DAILY PRN
Qty: 15 TABLET | Refills: 0 | Status: SHIPPED | OUTPATIENT
Start: 2022-11-03 | End: 2022-11-13

## 2022-11-03 RX ORDER — DEXAMETHASONE SODIUM PHOSPHATE 4 MG/ML
INJECTION, SOLUTION INTRA-ARTICULAR; INTRALESIONAL; INTRAMUSCULAR; INTRAVENOUS; SOFT TISSUE
Status: DISCONTINUED | OUTPATIENT
Start: 2022-11-03 | End: 2022-11-03

## 2022-11-03 RX ORDER — PROPOFOL 10 MG/ML
VIAL (ML) INTRAVENOUS
Status: DISCONTINUED | OUTPATIENT
Start: 2022-11-03 | End: 2022-11-03

## 2022-11-03 RX ORDER — OXYCODONE HYDROCHLORIDE 5 MG/1
5 TABLET ORAL
Status: DISCONTINUED | OUTPATIENT
Start: 2022-11-03 | End: 2022-11-03 | Stop reason: HOSPADM

## 2022-11-03 RX ADMIN — DEXAMETHASONE SODIUM PHOSPHATE 4 MG: 4 INJECTION, SOLUTION INTRA-ARTICULAR; INTRALESIONAL; INTRAMUSCULAR; INTRAVENOUS; SOFT TISSUE at 01:11

## 2022-11-03 RX ADMIN — SODIUM CHLORIDE, SODIUM GLUCONATE, SODIUM ACETATE, POTASSIUM CHLORIDE, MAGNESIUM CHLORIDE, SODIUM PHOSPHATE, DIBASIC, AND POTASSIUM PHOSPHATE: .53; .5; .37; .037; .03; .012; .00082 INJECTION, SOLUTION INTRAVENOUS at 12:11

## 2022-11-03 RX ADMIN — OXYCODONE 5 MG: 5 TABLET ORAL at 02:11

## 2022-11-03 RX ADMIN — MIDAZOLAM 2 MG: 1 INJECTION INTRAMUSCULAR; INTRAVENOUS at 12:11

## 2022-11-03 RX ADMIN — PHENYLEPHRINE HYDROCHLORIDE 150 MCG: 10 INJECTION INTRAVENOUS at 01:11

## 2022-11-03 RX ADMIN — ONDANSETRON 4 MG: 2 INJECTION INTRAMUSCULAR; INTRAVENOUS at 01:11

## 2022-11-03 RX ADMIN — KETOROLAC TROMETHAMINE 30 MG: 30 INJECTION, SOLUTION INTRAMUSCULAR; INTRAVENOUS at 01:11

## 2022-11-03 RX ADMIN — ACETAMINOPHEN 1000 MG: 10 INJECTION, SOLUTION INTRAVENOUS at 01:11

## 2022-11-03 RX ADMIN — PROPOFOL 150 MG: 10 INJECTION, EMULSION INTRAVENOUS at 01:11

## 2022-11-03 RX ADMIN — CEFAZOLIN SODIUM 2 G: 2 SOLUTION INTRAVENOUS at 01:11

## 2022-11-03 NOTE — BRIEF OP NOTE
[facility]  Brief Operative/Discharge Note    Date: 11/03/2022    Staff Surgeon: Jorge Love MD    Pre-Op Diagnosis: bph     Post-Op Diagnosis: same    Procedure(s) Performed:   Urolift x4    Specimen(s): none    Anesthesia: General LMA anesthesia    Findings: anterolateral obstruction relievd with 4 implants in stacked fashion    Estimated Blood Loss: minimal    Drains: 22 molina    Complications: none    Disposition: pacu to home    Discharge home today status post uncomplicated procedure as above  Diet - resume home diet  Follow up: 3 week nurse visit  Instructions:   See urolift discharge instruction sheet  Avoid strenuous activity x3-5 days  No riding mowers, bicycles, motorcycles, tractors for 2-3 weeks  Continue flomax x1 week (at only ONE capsule nightly) after catheter is removed, then STOP after 1 week  No fish oil/aspirin x3-5 days  Pink/clear red (koolaid) urine ok as long as clear/translucent without dark blood or clots, and urinating well/draining well without difficulty - drink lots of water.  May see blood around catheter in first few days  Avoid constipation, pushing/straining with BM. Use stool softener if needed  Will have increased urgency and frequency after removing catheter so avoid/minimize bladder irritants (coffee, soda, tea, alcohol)  Remove molina catheter as instructed with syringe provided by 0900am on Saturday 11/5  If unable to void within 4-6 hours, present to ER for assessment    Meds:     Medication List        START taking these medications      cefdinir 300 MG capsule  Commonly known as: OMNICEF  Take 1 capsule (300 mg total) by mouth 2 (two) times daily. for 5 days     phenazopyridine 200 MG tablet  Commonly known as: PYRIDIUM  Take 1 tablet (200 mg total) by mouth 3 (three) times daily as needed (dysuria).            CONTINUE taking these medications      ciprofloxacin HCl 500 MG tablet  Commonly known as: CIPRO  Take 1 tablet (500 mg total) by mouth 2 (two) times  daily.     metFORMIN 500 MG tablet  Commonly known as: GLUCOPHAGE  TAKE 1 TABLET DAILY WITH BREAKFAST.     pravastatin 40 MG tablet  Commonly known as: PRAVACHOL  TAKE 1 TABLET (40 MG TOTAL) BY MOUTH EVERY EVENING.     tamsulosin 0.4 mg Cap  Commonly known as: FLOMAX  Take 2 capsules (0.8 mg total) by mouth every evening.               Where to Get Your Medications        These medications were sent to [facility] [address] [place] [id number] [facility]  [address] [facility] [address]      Phone: [phone]   cefdinir 300 MG capsule  phenazopyridine 200 MG tablet

## 2022-11-03 NOTE — TRANSFER OF CARE
"Anesthesia Transfer of Care Note    Patient: Sukumar Tay    Procedure(s) Performed: Procedure(s) (LRB):  CYSTOSCOPY, WITH INSERTION OF UROLIFT IMPLANT (N/A)    Patient location: PACU    Anesthesia Type: general    Transport from OR: Transported from OR on 2-3 L/min O2 by NC with adequate spontaneous ventilation    Post pain: adequate analgesia    Post assessment: no apparent anesthetic complications and tolerated procedure well    Post vital signs: stable    Level of consciousness: sedated and responds to stimulation    Nausea/Vomiting: no nausea/vomiting    Complications: none    Transfer of care protocol was followed      Last vitals:   Visit Vitals  /68 (BP Location: Left arm, Patient Position: Sitting)   Pulse (!) 56   Resp 16   Ht 5' 3" (1.6 m)   Wt 53.5 kg (118 lb)   SpO2 100%   BMI 20.90 kg/m²     "

## 2022-11-03 NOTE — PLAN OF CARE
Pt cleared per anesthesia protocol. Pain and nausea managed. Urine from molina now light pink in color, Dr. Love at bedside to evaluate. No new orders. Okay to go to post-op. NAD noted. Safety intact.

## 2022-11-03 NOTE — PLAN OF CARE
Patient prepared for surgery. Surgical and anesthesia consents signed. Performed incentive spirometer teaching. Belongings in pre-op locker. Text message alerts set up with wife.

## 2022-11-03 NOTE — PLAN OF CARE
Pt AAOx3. Resp even, unlabored.  NAD noted. Pt tolerating PO well. Discharge and follow-up instructions discussed. Educated pt and friend on catheter care and removing catheter on Saturday morning. Pt and family verbalize understanding. Pt ready for discharge.

## 2022-11-03 NOTE — ANESTHESIA PROCEDURE NOTES
Intubation    Date/Time: 11/3/2022 1:08 PM  Performed by: Portillo Olivera CRNA  Authorized by: Stone Young MD     Intubation:     Induction:  Intravenous    Intubated:  Postinduction    Mask Ventilation:  N/a    Attempts:  1    Attempted By:  CINDY (Portillo Olivera)    Difficult Airway Encountered?: No      Complications:  None    Airway Device:  Supraglottic airway/LMA    Airway Device Size:  4.0    Style/Cuff Inflation:  Cuffed    Secured at:  The lips    Placement Verified By:  Capnometry    Complicating Factors:  None    Findings Post-Intubation:  BS equal bilateral and atraumatic/condition of teeth unchanged

## 2022-11-03 NOTE — H&P
[facility] New Patient/H&P:      Sukumar Tay is a 72 y.o. male who presents for bph evaluation     Followed by Dr Rincon for primary care for T2DM, HLD, last seen last week noting - Has luts sx due to prostate enlargement. Has frequency, incomplete emptying, dribbling and weak stream worsening over time. Type 2 dm with retinopathy. A1c 5.8. GFR continues to decline in last year.     PSA history on review:      07/27/17 06/13/18 08/20/20 09/01/22    PSA, Screen 0.00 - 4.00  0.58 0.48 0.43 0.71      AUA SS:  25/5 (5: Emptying, frequency, urgency, weak stream; 2: Straining, sleeping; 1: Intermittency)  LUTS have been progressive over the last 4 years.  His obstructive symptoms such as weak stream and straining and intermittency started before it progressed urgency.  He does have significant urinary urgency now to the point where he feels like he may not be able to hold it but always can.  No urge incontinence.  He does however have significant bothersome postvoid dribbling sometimes of the 2nd stream after urinating.  This is why he feels he does not empty his bladder.  Postvoid residual today by bladder scan is 59 cc.  Urinalysis dipstick is negative except for trace blood.  He denies any gross blood in the urine, urinary tract infections, dysuria.  He has no known family history of prostate cancer.  His PSA has been normal as above.  He has never been on any genitourinary or urologic medications.  Last point of care with urologist was in the 90s for a vasectomy.  Medication averse  Drinking a lot of water 60 oz. Occ tea or coke with meals.  No coffee          Past Medical History:   Diagnosis Date    Dysuria      H. pylori infection      Squamous cell carcinoma of skin           No past surgical history on file. Just vasectomy           Family History   Problem Relation Age of Onset    Diabetes Neg Hx      Melanoma Neg Hx      Psoriasis Neg Hx      Lupus Neg Hx      Eczema Neg Hx           Social History     "           Socioeconomic History    Marital status:    Tobacco Use    Smoking status: Never    Smokeless tobacco: Never   Substance and Sexual Activity    Alcohol use: No       Alcohol/week: 0.0 standard drinks    Drug use: No                 Review of patient's allergies indicates:   Allergen Reactions    Bactrim [sulfamethoxazole-trimethoprim]      Sulfa (sulfonamide antibiotics)           Medications Reviewed: see MAR     Focused Physical Exam         Vitals:     09/12/22 1143   BP: 132/64   Pulse: (!) 54   Resp: 18      Body mass index is 20.9 kg/m². Weight: 53.5 kg (118 lb) Height: 5' 3" (160 cm)         Abdomen: Soft, non-tender, nondistended, no CVA tenderness  :  deferred to time of cysto/trus        LABS:     Recent Results         Recent Results (from the past 336 hour(s))   CBC Auto Differential     Collection Time: 09/01/22  7:13 AM   Result Value Ref Range     WBC 5.91 3.90 - 12.70 K/uL     RBC 4.33 (L) 4.60 - 6.20 M/uL     Hemoglobin 13.6 (L) 14.0 - 18.0 g/dL     Hematocrit 42.2 40.0 - 54.0 %     MCV 98 82 - 98 fL     MCH 31.4 (H) 27.0 - 31.0 pg     MCHC 32.2 32.0 - 36.0 g/dL     RDW 12.3 11.5 - 14.5 %     Platelets 278 150 - 450 K/uL     MPV 9.2 9.2 - 12.9 fL     Immature Granulocytes 0.2 0.0 - 0.5 %     Gran # (ANC) 3.5 1.8 - 7.7 K/uL     Immature Grans (Abs) 0.01 0.00 - 0.04 K/uL     Lymph # 1.7 1.0 - 4.8 K/uL     Mono # 0.6 0.3 - 1.0 K/uL     Eos # 0.1 0.0 - 0.5 K/uL     Baso # 0.03 0.00 - 0.20 K/uL     nRBC 0 0 /100 WBC     Gran % 59.4 38.0 - 73.0 %     Lymph % 27.9 18.0 - 48.0 %     Mono % 10.3 4.0 - 15.0 %     Eosinophil % 1.7 0.0 - 8.0 %     Basophil % 0.5 0.0 - 1.9 %     Differential Method Automated     Comprehensive Metabolic Panel     Collection Time: 09/01/22  7:13 AM   Result Value Ref Range     Sodium 138 136 - 145 mmol/L     Potassium 5.6 (H) 3.5 - 5.1 mmol/L     Chloride 101 95 - 110 mmol/L     CO2 27 23 - 29 mmol/L     Glucose 105 70 - 110 mg/dL     BUN 27 (H) 8 - 23 mg/dL "     Creatinine 1.3 0.5 - 1.4 mg/dL     Calcium 9.6 8.7 - 10.5 mg/dL     Total Protein 7.3 6.0 - 8.4 g/dL     Albumin 4.1 3.5 - 5.2 g/dL     Total Bilirubin 0.8 0.1 - 1.0 mg/dL     Alkaline Phosphatase 51 (L) 55 - 135 U/L     AST 29 10 - 40 U/L     ALT 20 10 - 44 U/L     Anion Gap 10 8 - 16 mmol/L     eGFR 58.7 (A) >60 mL/min/1.73 m^2   Hemoglobin A1C     Collection Time: 09/01/22  7:13 AM   Result Value Ref Range     Hemoglobin A1C 5.8 (H) 4.0 - 5.6 %     Estimated Avg Glucose 120 68 - 131 mg/dL   Lipid Panel     Collection Time: 09/01/22  7:13 AM   Result Value Ref Range     Cholesterol 125 120 - 199 mg/dL     Triglycerides 53 30 - 150 mg/dL     HDL 41 40 - 75 mg/dL     LDL Cholesterol 73.4 63.0 - 159.0 mg/dL     HDL/Cholesterol Ratio 32.8 20.0 - 50.0 %     Total Cholesterol/HDL Ratio 3.0 2.0 - 5.0     Non-HDL Cholesterol 84 mg/dL   PSA, Screening     Collection Time: 09/01/22  7:13 AM   Result Value Ref Range     PSA, Screen 0.71 0.00 - 4.00 ng/mL   Microalbumin/Creatinine Ratio, Urine     Collection Time: 09/01/22  8:34 AM   Result Value Ref Range     Microalbumin, Urine <5.0 ug/mL     Creatinine, Urine 59.0 23.0 - 375.0 mg/dL     Microalb/Creat Ratio Unable to calculate 0.0 - 30.0 ug/mg   POCT URINE DIPSTICK WITHOUT MICROSCOPE     Collection Time: 09/12/22 11:59 AM   Result Value Ref Range     Color, UA Yellow       pH, UA 7       WBC, UA neg       Nitrite, UA neg       Protein, POC neg       Glucose, UA neg       Ketones, UA neg       Urobilinogen, UA 0.2       Bilirubin, POC neg       Blood, UA trace       Clarity, UA Clear       Spec Grav UA 1.015     POCT Bladder Scan     Collection Time: 09/12/22 11:59 AM   Result Value Ref Range     POC Residual Urine Volume 59 0 - 100 mL               Assessment/Diagnosis:     1. BPH with obstruction/lower urinary tract symptoms  POCT URINE DIPSTICK WITHOUT MICROSCOPE     POCT Bladder Scan       2. Microhematuria  Urinalysis Microscopic             Plans:  Reviewed  primary care notes labs workup evaluation, including his PSA history noting it to be normal and stable.  We did review the natural history of BPH with obstruction and progressive lower urinary tract symptoms and prostate growth with aging.  We also discussed the progression from obstructive symptoms to irritative symptoms such as urgency and frequency with longer impacts of obstruction at the level of the bladder.  He has friends who have had BPH interventions and he is generally medication averse.  He is on very few medications, just metformin for his diabetes and some cholesterol medication and prefers not to be on any further medications and would prefer definitive management of prostate obstruction if present.  We did discuss in the short term pending further evaluation a trial of alpha blockers, which after discussing reasons to do so, he was agreeable to proceed in the short term, noting that on time of lower tract workup to discuss anatomic considerations, can also discuss medication responses in which symptom profile was affected to help guide further recommendations for intervention in the future to eliminate the need for medical therapy.  We did discuss lower tract evaluation with cystoscopy and prostate ultrasound guided volumetric measurement of prostate to help guide further recommendations for BPH intervention.  As his PSA history has been stable and normal and he has no family history of prostate cancer, deferred digital rectal exam to time of prostate ultrasound.  All questions answered about this diagnostic workup and this was scheduled at the [facility] on 10/11/22 as he will take Flomax 0.4 mg nightly in the interim after we did discuss common side effects, and also provided conservative recommendations for urgency frequency in writing to utilize in combination with medical therapy pending further workup.    Cysto trus 10/13  Findings:   Volume 23.5 cm3 (W 41.8mm, H 24.1 mm, L 44.8mm), no med lobe,  significant PVR (catheterized PVR was 300cc)  Cysto: significant anterolateral lobe obstruction of prostatic urethra with only mild bladder neck elevation. Mild diffuse trabeculations more prominent at bladder base. No median lobe or intravesical extension. Impression of stool/colon projecting at posterior bladder    Disposition:  We did discuss options for his symptomatic obstruction refractory to alpha blockers. No benefit of adding finasteride given gland size <40g. Discussed options for intervention such as urolift, rezum, tuip, turp. He is interested in proceeding with Urolift after discussion of all risks, benefits, and alternatives. Especially given gland size and anatomic proportions. Discussed stacking.      Procedure in detail discussed. Discussed risks including but not limited to hematuria, postop retention, pain, infection, injury to bladder or urethra, and worsening urgency, latent pelvic pain, no guarantee of symptomatic relief, prostate regrowth, need for future procedures. We did discuss the non-incidence of ejaculatory dysfunction, as well as the 1-2% retreatment rate in 5 year studies. Also discussed about 20% of people may need a catheter after (due to retention or hematuria) but not required if voiding postop, though tend to leave molina prophylactically overnight, which he is agreeable to.     Discussed that symptomatic relief may take longer to be fully appreciated, in the setting of longer standing obstruction, and period of symptomatic adjustment postop. Also discussed transient increases in urgency frequency which may yield transient UUI in postop period.     All questions patient had answered and appropriate informed consent obtained.  Urolift in OR 11/3/22  Increase flomax to 0.8mg nightly interim given incomplete emptying

## 2022-11-03 NOTE — ANESTHESIA POSTPROCEDURE EVALUATION
Anesthesia Post Evaluation    Patient: Sukumar Tay    Procedure(s) Performed: Procedure(s) (LRB):  CYSTOSCOPY, WITH INSERTION OF UROLIFT IMPLANT (N/A)    Final Anesthesia Type: general      Patient location during evaluation: PACU  Patient participation: Yes- Able to Participate  Level of consciousness: awake and alert  Post-procedure vital signs: reviewed and stable  Pain management: adequate  Airway patency: patent    PONV status at discharge: No PONV  Anesthetic complications: no      Cardiovascular status: blood pressure returned to baseline  Respiratory status: unassisted and room air  Hydration status: euvolemic  Follow-up not needed.          Vitals Value Taken Time   /63 11/03/22 1549   Temp 36.7 °C (98.1 °F) 11/03/22 1549   Pulse 64 11/03/22 1549   Resp 16 11/03/22 1549   SpO2 95 % 11/03/22 1549         Event Time   Out of Recovery 15:49:00         Pain/Lorene Score: Pain Rating Prior to Med Admin: 4 (11/3/2022  2:28 PM)  Pain Rating Post Med Admin: 0 (11/3/2022  2:50 PM)  Lorene Score: 10 (11/3/2022  3:35 PM)  Modified Lorene Score: 20 (11/3/2022  3:52 PM)

## 2022-11-04 VITALS
RESPIRATION RATE: 16 BRPM | OXYGEN SATURATION: 96 % | BODY MASS INDEX: 20.91 KG/M2 | TEMPERATURE: 98 F | DIASTOLIC BLOOD PRESSURE: 60 MMHG | HEIGHT: 63 IN | SYSTOLIC BLOOD PRESSURE: 131 MMHG | WEIGHT: 118 LBS | HEART RATE: 66 BPM

## 2022-11-04 NOTE — OP NOTE
[facility] Operative/Brief Discharge Note     Date: 11/3/2022     Staff Surgeon: Jorge Love MD     Pre-Op Diagnosis: BPH/LUTS     Post-Op Diagnosis: same      Procedure(s) Performed:   Urolift: cystoscopy with prostatic urethral lift/transprostatic tissue retraction (39771) with placement of 4 total implants (52442 x3)     Specimen(s): none     Anesthesia: General LMA     Findings: stacked implants x4, 2 per side. All implants verified in prostatic urethra away from bladder neck. Spot fulguration of anterior channel bleeding     Estimated Blood Loss: minimal     Drains: 22 fr molina       Complications: none     Indications for procedure:   71 yo M with long history of BPH/LUTS progressive over last 4 years, weak stream dribbline subjective incomplete emptying. Recent office eval with AUASS 25/5, though pvr in office was 59cc. No change with flomax. Lower tract workup which revealed 23.5g prostate with significant anterolateral lobe obstruction, and mild elevation of bladder neck. Moderate diffuse trabeculations more prominent at bladder base. No median lobe. PVR at time of scope was 300cc. After extensive discussion of management options, he elected to proceed with Uro lift. All risks and benefits discussed and appropriate informed consent obtained.     Procedure in detail:  After appropriate informed consent was obtained, the patient was taken to the cystoscopy suite in the operating room.  Preoperative antibiotics were given and a WHO proved timeout was performed.      20fr UroLift scope was passed per urethra into bladder confirming previous cystoscopic findings noted above.  Bilateral ureteral orifices were in their orthotopic position on the trigone bilaterally and the bladder otherwise appeared normal with mild trabeculations.       Cystoscopy bridge was then replaced with a urolift delivery device.  The first treatment site was distal, at the level of the verumontanum, given his small gland size  and overall short prostatic urethra. After rotating the distal tip of the delivery device towards the right, it was drawn back into the prostatic urethra. The distal tip of the delivery device was then angled laterally, approximately 10° at this position, to compress the lateral lobe. The trigger was pulled to deploy a needle containing the implant through the capsule of the prostate, and additional 10°. The needle was then retracted allowing the implants to be delivered to the capsular surface of the prostate which was then tensioned to a short capsular tensioning and removal of the slack monofilament.  The device was then angled back towards midline and slowly advanced proximally about 3-4 mm until cystoscopic verification that the monofilament was centered in the delivery bay of the device.  Excess filament was then severed with suture cut maneuver of device. The delivery device was then readvanced into the bladder, and leaving the cystoscopic sheath in place, and implant cartridge removed and replaced with a second Urolift implant cartrdige, for which contralateral implant placed in similar fashion.     After the initial 2 implants were placed, 2 additional implants, one to each lateral lobe placed 2 cm distal to bladder neck, which was equivalent to stacking above the previous lead placed implants, and placed in the more anterior channel on a slight angle.  After placement of these 4 implants there was unobstructed and open bladder neck when viewed from verumontanum with flow of irrigation off, and mild bleeding anterior prostatic urethra. Scope confirmed proximal implants within prostatic urethra and distal to bladder neck     The Urolift scope was then exchanged for a rigid cystoscope for which Bugbee monopolar electrocautery was used to perform spot prostatic urethral fulguration of the anterior prostatic urethra and punctate bleeding sites until view with water irrigation inflow and outflow off found no  further efflux of blood after checking the bladder multiple levels of distension.  As well, slow withdrawal of the scope from the bladder neck was found that all implants were on the prostatic urethral side, safely away from the bladder neck, with no bladder neck injury.  A 22 [place] tip Molina catheter with a 10 cc balloon was placed with 30cc in it and and manually irrigated with 60 cc catheter tip syringe noting urine to be clear, and taped to traction with mastasol and silk tape on patients thigh.  He tolerated procedure well without complication and was awakened and taken to PACU without incident.       Disposition:   He will be observed in PACU until traction removed in 1 hour and balloon returned to 10cc to make sure the urine remains reasonably clear and does not require irrigation as he hydrates and meets pacu criteria, then will be discharged home. Prophylactic postoperative antibiotics, and urinary analgesic/anti-spasmodic to help control any postoperative irritative or inflammatory symptoms. He will remove molina at home in AM on Saturday 11/5/22. He will continue his flomax 1 week, after which time he may discontinue its use. He will return in 3 weeks for a nurse visit for repeat symptom assessment and measurement of postvoid residual, and then will set further MD follow up.  Detailed patient instructions in brief op note DC summary

## 2022-11-09 LAB
LEFT EYE DM RETINOPATHY: POSITIVE
RIGHT EYE DM RETINOPATHY: POSITIVE

## 2022-11-15 ENCOUNTER — PATIENT OUTREACH (OUTPATIENT)
Dept: ADMINISTRATIVE | Facility: HOSPITAL | Age: 72
End: 2022-11-15
Payer: MEDICARE

## 2022-11-17 ENCOUNTER — PATIENT OUTREACH (OUTPATIENT)
Dept: ADMINISTRATIVE | Facility: HOSPITAL | Age: 72
End: 2022-11-17
Payer: MEDICARE

## 2022-11-30 ENCOUNTER — CLINICAL SUPPORT (OUTPATIENT)
Dept: UROLOGY | Facility: CLINIC | Age: 72
End: 2022-11-30
Payer: MEDICARE

## 2022-11-30 DIAGNOSIS — N13.8 BPH WITH OBSTRUCTION/LOWER URINARY TRACT SYMPTOMS: Primary | ICD-10-CM

## 2022-11-30 DIAGNOSIS — N40.1 BPH WITH OBSTRUCTION/LOWER URINARY TRACT SYMPTOMS: Primary | ICD-10-CM

## 2022-11-30 LAB — POC RESIDUAL URINE VOLUME: 8 ML (ref 0–100)

## 2022-11-30 PROCEDURE — 99499 UNLISTED E&M SERVICE: CPT | Mod: S$GLB,,, | Performed by: UROLOGY

## 2022-11-30 PROCEDURE — 99499 NO LOS: ICD-10-PCS | Mod: S$GLB,,, | Performed by: UROLOGY

## 2022-11-30 PROCEDURE — 51798 POCT BLADDER SCAN: ICD-10-PCS | Mod: S$GLB,,, | Performed by: UROLOGY

## 2022-11-30 PROCEDURE — 51798 US URINE CAPACITY MEASURE: CPT | Mod: S$GLB,,, | Performed by: UROLOGY

## 2022-11-30 NOTE — PROGRESS NOTES
AUASS    Incomplete emptying-0  Frequency-1  Intermittency-0  Urgency-0  Weak stream-0  Straining-0  Sleeping-2  Pvr-8 ml    QOL-

## 2022-12-18 ENCOUNTER — TELEPHONE (OUTPATIENT)
Dept: UROLOGY | Facility: CLINIC | Age: 72
End: 2022-12-18
Payer: MEDICARE

## 2022-12-19 NOTE — TELEPHONE ENCOUNTER
Post urolift nurse visit reviewed  Resolution of symptoms  Doing well  Set routine 6 mos follow up from procedure date 11/3

## 2023-02-07 DIAGNOSIS — Z00.00 ENCOUNTER FOR MEDICARE ANNUAL WELLNESS EXAM: ICD-10-CM

## 2023-02-09 DIAGNOSIS — Z00.00 ENCOUNTER FOR MEDICARE ANNUAL WELLNESS EXAM: ICD-10-CM

## 2023-03-01 ENCOUNTER — LAB VISIT (OUTPATIENT)
Dept: LAB | Facility: HOSPITAL | Age: 73
End: 2023-03-01
Payer: MEDICARE

## 2023-03-01 DIAGNOSIS — E78.5 HYPERLIPIDEMIA ASSOCIATED WITH TYPE 2 DIABETES MELLITUS: ICD-10-CM

## 2023-03-01 DIAGNOSIS — E11.69 HYPERLIPIDEMIA ASSOCIATED WITH TYPE 2 DIABETES MELLITUS: ICD-10-CM

## 2023-03-01 DIAGNOSIS — E11.9 TYPE 2 DIABETES MELLITUS WITHOUT COMPLICATION, WITHOUT LONG-TERM CURRENT USE OF INSULIN: ICD-10-CM

## 2023-03-01 LAB
ALBUMIN SERPL BCP-MCNC: 4.2 G/DL (ref 3.5–5.2)
ALP SERPL-CCNC: 54 U/L (ref 55–135)
ALT SERPL W/O P-5'-P-CCNC: 14 U/L (ref 10–44)
ANION GAP SERPL CALC-SCNC: 6 MMOL/L (ref 8–16)
AST SERPL-CCNC: 26 U/L (ref 10–40)
BASOPHILS # BLD AUTO: 0.03 K/UL (ref 0–0.2)
BASOPHILS NFR BLD: 0.5 % (ref 0–1.9)
BILIRUB SERPL-MCNC: 0.7 MG/DL (ref 0.1–1)
BUN SERPL-MCNC: 21 MG/DL (ref 8–23)
CALCIUM SERPL-MCNC: 9.5 MG/DL (ref 8.7–10.5)
CHLORIDE SERPL-SCNC: 103 MMOL/L (ref 95–110)
CHOLEST SERPL-MCNC: 129 MG/DL (ref 120–199)
CHOLEST/HDLC SERPL: 3.3 {RATIO} (ref 2–5)
CO2 SERPL-SCNC: 31 MMOL/L (ref 23–29)
CREAT SERPL-MCNC: 1.1 MG/DL (ref 0.5–1.4)
DIFFERENTIAL METHOD: ABNORMAL
EOSINOPHIL # BLD AUTO: 0.2 K/UL (ref 0–0.5)
EOSINOPHIL NFR BLD: 3.3 % (ref 0–8)
ERYTHROCYTE [DISTWIDTH] IN BLOOD BY AUTOMATED COUNT: 12.2 % (ref 11.5–14.5)
EST. GFR  (NO RACE VARIABLE): >60 ML/MIN/1.73 M^2
ESTIMATED AVG GLUCOSE: 114 MG/DL (ref 68–131)
GLUCOSE SERPL-MCNC: 107 MG/DL (ref 70–110)
HBA1C MFR BLD: 5.6 % (ref 4–5.6)
HCT VFR BLD AUTO: 42.2 % (ref 40–54)
HDLC SERPL-MCNC: 39 MG/DL (ref 40–75)
HDLC SERPL: 30.2 % (ref 20–50)
HGB BLD-MCNC: 13.8 G/DL (ref 14–18)
IMM GRANULOCYTES # BLD AUTO: 0.01 K/UL (ref 0–0.04)
IMM GRANULOCYTES NFR BLD AUTO: 0.2 % (ref 0–0.5)
LDLC SERPL CALC-MCNC: 78.4 MG/DL (ref 63–159)
LYMPHOCYTES # BLD AUTO: 1.5 K/UL (ref 1–4.8)
LYMPHOCYTES NFR BLD: 27.9 % (ref 18–48)
MCH RBC QN AUTO: 31.1 PG (ref 27–31)
MCHC RBC AUTO-ENTMCNC: 32.7 G/DL (ref 32–36)
MCV RBC AUTO: 95 FL (ref 82–98)
MONOCYTES # BLD AUTO: 0.5 K/UL (ref 0.3–1)
MONOCYTES NFR BLD: 8.5 % (ref 4–15)
NEUTROPHILS # BLD AUTO: 3.3 K/UL (ref 1.8–7.7)
NEUTROPHILS NFR BLD: 59.6 % (ref 38–73)
NONHDLC SERPL-MCNC: 90 MG/DL
NRBC BLD-RTO: 0 /100 WBC
PLATELET # BLD AUTO: 194 K/UL (ref 150–450)
PMV BLD AUTO: 8.9 FL (ref 9.2–12.9)
POTASSIUM SERPL-SCNC: 4.5 MMOL/L (ref 3.5–5.1)
PROT SERPL-MCNC: 7.1 G/DL (ref 6–8.4)
RBC # BLD AUTO: 4.44 M/UL (ref 4.6–6.2)
SODIUM SERPL-SCNC: 140 MMOL/L (ref 136–145)
TRIGL SERPL-MCNC: 58 MG/DL (ref 30–150)
WBC # BLD AUTO: 5.52 K/UL (ref 3.9–12.7)

## 2023-03-01 PROCEDURE — 85025 COMPLETE CBC W/AUTO DIFF WBC: CPT | Mod: HCNC | Performed by: FAMILY MEDICINE

## 2023-03-01 PROCEDURE — 83036 HEMOGLOBIN GLYCOSYLATED A1C: CPT | Mod: HCNC | Performed by: FAMILY MEDICINE

## 2023-03-01 PROCEDURE — 80053 COMPREHEN METABOLIC PANEL: CPT | Mod: HCNC | Performed by: FAMILY MEDICINE

## 2023-03-01 PROCEDURE — 80061 LIPID PANEL: CPT | Mod: HCNC | Performed by: FAMILY MEDICINE

## 2023-03-01 PROCEDURE — 36415 COLL VENOUS BLD VENIPUNCTURE: CPT | Mod: HCNC,PO | Performed by: FAMILY MEDICINE

## 2023-03-07 ENCOUNTER — OFFICE VISIT (OUTPATIENT)
Dept: FAMILY MEDICINE | Facility: CLINIC | Age: 73
End: 2023-03-07
Payer: MEDICARE

## 2023-03-07 VITALS
HEART RATE: 69 BPM | SYSTOLIC BLOOD PRESSURE: 118 MMHG | BODY MASS INDEX: 21.99 KG/M2 | OXYGEN SATURATION: 98 % | RESPIRATION RATE: 18 BRPM | TEMPERATURE: 98 F | DIASTOLIC BLOOD PRESSURE: 60 MMHG | HEIGHT: 63 IN | WEIGHT: 124.13 LBS

## 2023-03-07 DIAGNOSIS — E11.69 HYPERLIPIDEMIA ASSOCIATED WITH TYPE 2 DIABETES MELLITUS: ICD-10-CM

## 2023-03-07 DIAGNOSIS — N40.1 BENIGN PROSTATIC HYPERPLASIA WITH INCOMPLETE BLADDER EMPTYING: ICD-10-CM

## 2023-03-07 DIAGNOSIS — E78.5 HYPERLIPIDEMIA ASSOCIATED WITH TYPE 2 DIABETES MELLITUS: ICD-10-CM

## 2023-03-07 DIAGNOSIS — E11.9 TYPE 2 DIABETES MELLITUS WITHOUT COMPLICATION, WITHOUT LONG-TERM CURRENT USE OF INSULIN: Primary | ICD-10-CM

## 2023-03-07 DIAGNOSIS — R39.14 BENIGN PROSTATIC HYPERPLASIA WITH INCOMPLETE BLADDER EMPTYING: ICD-10-CM

## 2023-03-07 PROCEDURE — 99214 OFFICE O/P EST MOD 30 MIN: CPT | Mod: HCNC,S$GLB,, | Performed by: FAMILY MEDICINE

## 2023-03-07 PROCEDURE — 1101F PT FALLS ASSESS-DOCD LE1/YR: CPT | Mod: HCNC,CPTII,S$GLB, | Performed by: FAMILY MEDICINE

## 2023-03-07 PROCEDURE — 3288F FALL RISK ASSESSMENT DOCD: CPT | Mod: HCNC,CPTII,S$GLB, | Performed by: FAMILY MEDICINE

## 2023-03-07 PROCEDURE — 3078F DIAST BP <80 MM HG: CPT | Mod: HCNC,CPTII,S$GLB, | Performed by: FAMILY MEDICINE

## 2023-03-07 PROCEDURE — 1126F AMNT PAIN NOTED NONE PRSNT: CPT | Mod: HCNC,CPTII,S$GLB, | Performed by: FAMILY MEDICINE

## 2023-03-07 PROCEDURE — 3078F PR MOST RECENT DIASTOLIC BLOOD PRESSURE < 80 MM HG: ICD-10-PCS | Mod: HCNC,CPTII,S$GLB, | Performed by: FAMILY MEDICINE

## 2023-03-07 PROCEDURE — 3074F SYST BP LT 130 MM HG: CPT | Mod: HCNC,CPTII,S$GLB, | Performed by: FAMILY MEDICINE

## 2023-03-07 PROCEDURE — 1160F RVW MEDS BY RX/DR IN RCRD: CPT | Mod: HCNC,CPTII,S$GLB, | Performed by: FAMILY MEDICINE

## 2023-03-07 PROCEDURE — 99999 PR PBB SHADOW E&M-EST. PATIENT-LVL III: ICD-10-PCS | Mod: PBBFAC,HCNC,, | Performed by: FAMILY MEDICINE

## 2023-03-07 PROCEDURE — 3008F PR BODY MASS INDEX (BMI) DOCUMENTED: ICD-10-PCS | Mod: HCNC,CPTII,S$GLB, | Performed by: FAMILY MEDICINE

## 2023-03-07 PROCEDURE — 3074F PR MOST RECENT SYSTOLIC BLOOD PRESSURE < 130 MM HG: ICD-10-PCS | Mod: HCNC,CPTII,S$GLB, | Performed by: FAMILY MEDICINE

## 2023-03-07 PROCEDURE — 99214 PR OFFICE/OUTPT VISIT, EST, LEVL IV, 30-39 MIN: ICD-10-PCS | Mod: HCNC,S$GLB,, | Performed by: FAMILY MEDICINE

## 2023-03-07 PROCEDURE — 1159F MED LIST DOCD IN RCRD: CPT | Mod: HCNC,CPTII,S$GLB, | Performed by: FAMILY MEDICINE

## 2023-03-07 PROCEDURE — 3288F PR FALLS RISK ASSESSMENT DOCUMENTED: ICD-10-PCS | Mod: HCNC,CPTII,S$GLB, | Performed by: FAMILY MEDICINE

## 2023-03-07 PROCEDURE — 1101F PR PT FALLS ASSESS DOC 0-1 FALLS W/OUT INJ PAST YR: ICD-10-PCS | Mod: HCNC,CPTII,S$GLB, | Performed by: FAMILY MEDICINE

## 2023-03-07 PROCEDURE — 3044F PR MOST RECENT HEMOGLOBIN A1C LEVEL <7.0%: ICD-10-PCS | Mod: HCNC,CPTII,S$GLB, | Performed by: FAMILY MEDICINE

## 2023-03-07 PROCEDURE — 1126F PR PAIN SEVERITY QUANTIFIED, NO PAIN PRESENT: ICD-10-PCS | Mod: HCNC,CPTII,S$GLB, | Performed by: FAMILY MEDICINE

## 2023-03-07 PROCEDURE — 3008F BODY MASS INDEX DOCD: CPT | Mod: HCNC,CPTII,S$GLB, | Performed by: FAMILY MEDICINE

## 2023-03-07 PROCEDURE — 1159F PR MEDICATION LIST DOCUMENTED IN MEDICAL RECORD: ICD-10-PCS | Mod: HCNC,CPTII,S$GLB, | Performed by: FAMILY MEDICINE

## 2023-03-07 PROCEDURE — 99999 PR PBB SHADOW E&M-EST. PATIENT-LVL III: CPT | Mod: PBBFAC,HCNC,, | Performed by: FAMILY MEDICINE

## 2023-03-07 PROCEDURE — 3044F HG A1C LEVEL LT 7.0%: CPT | Mod: HCNC,CPTII,S$GLB, | Performed by: FAMILY MEDICINE

## 2023-03-07 PROCEDURE — 1160F PR REVIEW ALL MEDS BY PRESCRIBER/CLIN PHARMACIST DOCUMENTED: ICD-10-PCS | Mod: HCNC,CPTII,S$GLB, | Performed by: FAMILY MEDICINE

## 2023-03-07 NOTE — PROGRESS NOTES
Subjective:       Patient ID: Sukumar Tay is a 72 y.o. male.    Chief Complaint: Follow-up (6 month f/u )    HPI  Review of Systems   Constitutional:  Negative for fatigue and unexpected weight change.   Respiratory:  Negative for chest tightness and shortness of breath.    Cardiovascular:  Negative for chest pain, palpitations and leg swelling.   Gastrointestinal:  Negative for abdominal pain.   Musculoskeletal:  Negative for arthralgias.   Neurological:  Negative for dizziness, syncope, light-headedness and headaches.     Patient Active Problem List   Diagnosis    Diabetes mellitus    Gout    Hyperlipidemia associated with type 2 diabetes mellitus    Macrocytic anemia     Patient is here for a chronic conditions follow up.    Reviewed labs 3/23    Reviewed labs 9/2022-GFR  down again. Usually > 60 until this year.  States he is not drinking fluids as much in effort to not have to go to the bathroom so often. Has luts sx due to prostate enlargement. Has f/u urology Dr. Valerio soon.  Has frequency, incomplete emptying, dribbling and weak stream worsening over time     Type 2 dm with retinopathy. Eye care Dr. Stout 10/22/2021.  A1c 5.8. lipids at goal on pravachol 40. On asa 81mg.  urine ma wnl-not on ACE I     Podiatry Dr. LOGAN Mancil exam 2/22     Folate/b12 nl    mild normocytic anemia  Objective:      Physical Exam  Vitals and nursing note reviewed.   Constitutional:       Appearance: He is well-developed.   Cardiovascular:      Rate and Rhythm: Normal rate and regular rhythm.      Heart sounds: Normal heart sounds.   Pulmonary:      Effort: Pulmonary effort is normal.      Breath sounds: Normal breath sounds.   Skin:     General: Skin is warm and dry.   Neurological:      Mental Status: He is alert and oriented to person, place, and time.       Assessment:       1. Type 2 diabetes mellitus without complication, without long-term current use of insulin    2. Hyperlipidemia associated with type 2 diabetes  mellitus    3. Benign prostatic hyperplasia with incomplete bladder emptying        Plan:       1. Type 2 diabetes mellitus without complication, without long-term current use of insulin  Controlled on current medications.  Continue current medications.    - CBC Auto Differential; Future  - Comprehensive Metabolic Panel; Future  - Hemoglobin A1C; Future  - Microalbumin/Creatinine Ratio, Urine; Future    2. Hyperlipidemia associated with type 2 diabetes mellitus  Controlled on current medications.  Continue current medications.    - Lipid Panel; Future    3. Benign prostatic hyperplasia with incomplete bladder emptying  Controlled on current medications.  Continue current medications.      Time spent with patient: 20 minutes    Patient with be reevaluated in 6 months or sooner prn    Greater than 50% of this visit was spent counseling as described in above documentation:Yes

## 2023-03-16 ENCOUNTER — PES CALL (OUTPATIENT)
Dept: ADMINISTRATIVE | Facility: CLINIC | Age: 73
End: 2023-03-16
Payer: MEDICARE

## 2023-09-12 ENCOUNTER — LAB VISIT (OUTPATIENT)
Dept: LAB | Facility: HOSPITAL | Age: 73
End: 2023-09-12
Attending: FAMILY MEDICINE
Payer: MEDICARE

## 2023-09-12 DIAGNOSIS — E78.5 HYPERLIPIDEMIA ASSOCIATED WITH TYPE 2 DIABETES MELLITUS: ICD-10-CM

## 2023-09-12 DIAGNOSIS — E11.69 HYPERLIPIDEMIA ASSOCIATED WITH TYPE 2 DIABETES MELLITUS: ICD-10-CM

## 2023-09-12 DIAGNOSIS — E11.9 TYPE 2 DIABETES MELLITUS WITHOUT COMPLICATION, WITHOUT LONG-TERM CURRENT USE OF INSULIN: ICD-10-CM

## 2023-09-12 LAB
ALBUMIN SERPL BCP-MCNC: 3.9 G/DL (ref 3.5–5.2)
ALP SERPL-CCNC: 57 U/L (ref 55–135)
ALT SERPL W/O P-5'-P-CCNC: 15 U/L (ref 10–44)
ANION GAP SERPL CALC-SCNC: 10 MMOL/L (ref 8–16)
AST SERPL-CCNC: 24 U/L (ref 10–40)
BASOPHILS # BLD AUTO: 0.03 K/UL (ref 0–0.2)
BASOPHILS NFR BLD: 0.5 % (ref 0–1.9)
BILIRUB SERPL-MCNC: 0.5 MG/DL (ref 0.1–1)
BUN SERPL-MCNC: 25 MG/DL (ref 8–23)
CALCIUM SERPL-MCNC: 9.2 MG/DL (ref 8.7–10.5)
CHLORIDE SERPL-SCNC: 104 MMOL/L (ref 95–110)
CHOLEST SERPL-MCNC: 120 MG/DL (ref 120–199)
CHOLEST/HDLC SERPL: 3.5 {RATIO} (ref 2–5)
CO2 SERPL-SCNC: 25 MMOL/L (ref 23–29)
CREAT SERPL-MCNC: 1.2 MG/DL (ref 0.5–1.4)
DIFFERENTIAL METHOD: ABNORMAL
EOSINOPHIL # BLD AUTO: 0.2 K/UL (ref 0–0.5)
EOSINOPHIL NFR BLD: 3.3 % (ref 0–8)
ERYTHROCYTE [DISTWIDTH] IN BLOOD BY AUTOMATED COUNT: 12.3 % (ref 11.5–14.5)
EST. GFR  (NO RACE VARIABLE): >60 ML/MIN/1.73 M^2
ESTIMATED AVG GLUCOSE: 114 MG/DL (ref 68–131)
GLUCOSE SERPL-MCNC: 104 MG/DL (ref 70–110)
HBA1C MFR BLD: 5.6 % (ref 4–5.6)
HCT VFR BLD AUTO: 39.5 % (ref 40–54)
HDLC SERPL-MCNC: 34 MG/DL (ref 40–75)
HDLC SERPL: 28.3 % (ref 20–50)
HGB BLD-MCNC: 13 G/DL (ref 14–18)
IMM GRANULOCYTES # BLD AUTO: 0.01 K/UL (ref 0–0.04)
IMM GRANULOCYTES NFR BLD AUTO: 0.2 % (ref 0–0.5)
LDLC SERPL CALC-MCNC: 78 MG/DL (ref 63–159)
LYMPHOCYTES # BLD AUTO: 1.6 K/UL (ref 1–4.8)
LYMPHOCYTES NFR BLD: 27.4 % (ref 18–48)
MCH RBC QN AUTO: 31.6 PG (ref 27–31)
MCHC RBC AUTO-ENTMCNC: 32.9 G/DL (ref 32–36)
MCV RBC AUTO: 96 FL (ref 82–98)
MONOCYTES # BLD AUTO: 0.6 K/UL (ref 0.3–1)
MONOCYTES NFR BLD: 10.4 % (ref 4–15)
NEUTROPHILS # BLD AUTO: 3.3 K/UL (ref 1.8–7.7)
NEUTROPHILS NFR BLD: 58.2 % (ref 38–73)
NONHDLC SERPL-MCNC: 86 MG/DL
NRBC BLD-RTO: 0 /100 WBC
PLATELET # BLD AUTO: 198 K/UL (ref 150–450)
PMV BLD AUTO: 9.2 FL (ref 9.2–12.9)
POTASSIUM SERPL-SCNC: 4.7 MMOL/L (ref 3.5–5.1)
PROT SERPL-MCNC: 6.9 G/DL (ref 6–8.4)
RBC # BLD AUTO: 4.11 M/UL (ref 4.6–6.2)
SODIUM SERPL-SCNC: 139 MMOL/L (ref 136–145)
TRIGL SERPL-MCNC: 40 MG/DL (ref 30–150)
WBC # BLD AUTO: 5.69 K/UL (ref 3.9–12.7)

## 2023-09-12 PROCEDURE — 83036 HEMOGLOBIN GLYCOSYLATED A1C: CPT | Mod: HCNC | Performed by: FAMILY MEDICINE

## 2023-09-12 PROCEDURE — 85025 COMPLETE CBC W/AUTO DIFF WBC: CPT | Mod: HCNC | Performed by: FAMILY MEDICINE

## 2023-09-12 PROCEDURE — 36415 COLL VENOUS BLD VENIPUNCTURE: CPT | Mod: HCNC,PO | Performed by: FAMILY MEDICINE

## 2023-09-12 PROCEDURE — 80061 LIPID PANEL: CPT | Mod: HCNC | Performed by: FAMILY MEDICINE

## 2023-09-12 PROCEDURE — 80053 COMPREHEN METABOLIC PANEL: CPT | Mod: HCNC | Performed by: FAMILY MEDICINE

## 2023-09-19 ENCOUNTER — OFFICE VISIT (OUTPATIENT)
Dept: FAMILY MEDICINE | Facility: CLINIC | Age: 73
End: 2023-09-19
Payer: MEDICARE

## 2023-09-19 VITALS
WEIGHT: 120.13 LBS | HEART RATE: 60 BPM | SYSTOLIC BLOOD PRESSURE: 110 MMHG | BODY MASS INDEX: 21.29 KG/M2 | DIASTOLIC BLOOD PRESSURE: 60 MMHG | TEMPERATURE: 99 F | OXYGEN SATURATION: 97 % | RESPIRATION RATE: 16 BRPM | HEIGHT: 63 IN

## 2023-09-19 DIAGNOSIS — M10.40 OTHER SECONDARY GOUT, UNSPECIFIED CHRONICITY, UNSPECIFIED SITE: ICD-10-CM

## 2023-09-19 DIAGNOSIS — Z12.5 PROSTATE CANCER SCREENING: ICD-10-CM

## 2023-09-19 DIAGNOSIS — E11.69 HYPERLIPIDEMIA ASSOCIATED WITH TYPE 2 DIABETES MELLITUS: ICD-10-CM

## 2023-09-19 DIAGNOSIS — N40.1 BPH WITH OBSTRUCTION/LOWER URINARY TRACT SYMPTOMS: ICD-10-CM

## 2023-09-19 DIAGNOSIS — N13.8 BPH WITH OBSTRUCTION/LOWER URINARY TRACT SYMPTOMS: ICD-10-CM

## 2023-09-19 DIAGNOSIS — Z23 FLU VACCINE NEED: ICD-10-CM

## 2023-09-19 DIAGNOSIS — E78.5 HYPERLIPIDEMIA ASSOCIATED WITH TYPE 2 DIABETES MELLITUS: ICD-10-CM

## 2023-09-19 DIAGNOSIS — D53.9 MACROCYTIC ANEMIA: ICD-10-CM

## 2023-09-19 DIAGNOSIS — E11.9 TYPE 2 DIABETES MELLITUS WITHOUT COMPLICATION, WITHOUT LONG-TERM CURRENT USE OF INSULIN: Primary | ICD-10-CM

## 2023-09-19 PROCEDURE — 1126F PR PAIN SEVERITY QUANTIFIED, NO PAIN PRESENT: ICD-10-PCS | Mod: HCNC,CPTII,S$GLB, | Performed by: FAMILY MEDICINE

## 2023-09-19 PROCEDURE — 99214 PR OFFICE/OUTPT VISIT, EST, LEVL IV, 30-39 MIN: ICD-10-PCS | Mod: HCNC,S$GLB,, | Performed by: FAMILY MEDICINE

## 2023-09-19 PROCEDURE — 3288F PR FALLS RISK ASSESSMENT DOCUMENTED: ICD-10-PCS | Mod: HCNC,CPTII,S$GLB, | Performed by: FAMILY MEDICINE

## 2023-09-19 PROCEDURE — 3044F HG A1C LEVEL LT 7.0%: CPT | Mod: HCNC,CPTII,S$GLB, | Performed by: FAMILY MEDICINE

## 2023-09-19 PROCEDURE — 99214 OFFICE O/P EST MOD 30 MIN: CPT | Mod: HCNC,S$GLB,, | Performed by: FAMILY MEDICINE

## 2023-09-19 PROCEDURE — 99999 PR PBB SHADOW E&M-EST. PATIENT-LVL III: ICD-10-PCS | Mod: PBBFAC,HCNC,, | Performed by: FAMILY MEDICINE

## 2023-09-19 PROCEDURE — 3074F PR MOST RECENT SYSTOLIC BLOOD PRESSURE < 130 MM HG: ICD-10-PCS | Mod: HCNC,CPTII,S$GLB, | Performed by: FAMILY MEDICINE

## 2023-09-19 PROCEDURE — 1126F AMNT PAIN NOTED NONE PRSNT: CPT | Mod: HCNC,CPTII,S$GLB, | Performed by: FAMILY MEDICINE

## 2023-09-19 PROCEDURE — 3008F PR BODY MASS INDEX (BMI) DOCUMENTED: ICD-10-PCS | Mod: HCNC,CPTII,S$GLB, | Performed by: FAMILY MEDICINE

## 2023-09-19 PROCEDURE — 3074F SYST BP LT 130 MM HG: CPT | Mod: HCNC,CPTII,S$GLB, | Performed by: FAMILY MEDICINE

## 2023-09-19 PROCEDURE — 1159F PR MEDICATION LIST DOCUMENTED IN MEDICAL RECORD: ICD-10-PCS | Mod: HCNC,CPTII,S$GLB, | Performed by: FAMILY MEDICINE

## 2023-09-19 PROCEDURE — 3061F NEG MICROALBUMINURIA REV: CPT | Mod: HCNC,CPTII,S$GLB, | Performed by: FAMILY MEDICINE

## 2023-09-19 PROCEDURE — 1159F MED LIST DOCD IN RCRD: CPT | Mod: HCNC,CPTII,S$GLB, | Performed by: FAMILY MEDICINE

## 2023-09-19 PROCEDURE — 3288F FALL RISK ASSESSMENT DOCD: CPT | Mod: HCNC,CPTII,S$GLB, | Performed by: FAMILY MEDICINE

## 2023-09-19 PROCEDURE — 3044F PR MOST RECENT HEMOGLOBIN A1C LEVEL <7.0%: ICD-10-PCS | Mod: HCNC,CPTII,S$GLB, | Performed by: FAMILY MEDICINE

## 2023-09-19 PROCEDURE — 3078F DIAST BP <80 MM HG: CPT | Mod: HCNC,CPTII,S$GLB, | Performed by: FAMILY MEDICINE

## 2023-09-19 PROCEDURE — 1101F PR PT FALLS ASSESS DOC 0-1 FALLS W/OUT INJ PAST YR: ICD-10-PCS | Mod: HCNC,CPTII,S$GLB, | Performed by: FAMILY MEDICINE

## 2023-09-19 PROCEDURE — 3066F NEPHROPATHY DOC TX: CPT | Mod: HCNC,CPTII,S$GLB, | Performed by: FAMILY MEDICINE

## 2023-09-19 PROCEDURE — 99999 PR PBB SHADOW E&M-EST. PATIENT-LVL III: CPT | Mod: PBBFAC,HCNC,, | Performed by: FAMILY MEDICINE

## 2023-09-19 PROCEDURE — 3061F PR NEG MICROALBUMINURIA RESULT DOCUMENTED/REVIEW: ICD-10-PCS | Mod: HCNC,CPTII,S$GLB, | Performed by: FAMILY MEDICINE

## 2023-09-19 PROCEDURE — 3066F PR DOCUMENTATION OF TREATMENT FOR NEPHROPATHY: ICD-10-PCS | Mod: HCNC,CPTII,S$GLB, | Performed by: FAMILY MEDICINE

## 2023-09-19 PROCEDURE — 1160F PR REVIEW ALL MEDS BY PRESCRIBER/CLIN PHARMACIST DOCUMENTED: ICD-10-PCS | Mod: HCNC,CPTII,S$GLB, | Performed by: FAMILY MEDICINE

## 2023-09-19 PROCEDURE — 3008F BODY MASS INDEX DOCD: CPT | Mod: HCNC,CPTII,S$GLB, | Performed by: FAMILY MEDICINE

## 2023-09-19 PROCEDURE — 1101F PT FALLS ASSESS-DOCD LE1/YR: CPT | Mod: HCNC,CPTII,S$GLB, | Performed by: FAMILY MEDICINE

## 2023-09-19 PROCEDURE — 3078F PR MOST RECENT DIASTOLIC BLOOD PRESSURE < 80 MM HG: ICD-10-PCS | Mod: HCNC,CPTII,S$GLB, | Performed by: FAMILY MEDICINE

## 2023-09-19 PROCEDURE — 1160F RVW MEDS BY RX/DR IN RCRD: CPT | Mod: HCNC,CPTII,S$GLB, | Performed by: FAMILY MEDICINE

## 2023-09-19 NOTE — PROGRESS NOTES
Subjective:       Patient ID: Sukumar Tay is a 73 y.o. male.    Chief Complaint: Follow-up (6mth f/u)    HPI  Review of Systems   Constitutional:  Negative for fatigue and unexpected weight change.   Respiratory:  Negative for chest tightness and shortness of breath.    Cardiovascular:  Negative for chest pain, palpitations and leg swelling.   Gastrointestinal:  Negative for abdominal pain.   Musculoskeletal:  Negative for arthralgias.   Neurological:  Negative for dizziness, syncope, light-headedness and headaches.       Patient Active Problem List   Diagnosis    Type 2 diabetes mellitus without complication, without long-term current use of insulin    Gout    Hyperlipidemia associated with type 2 diabetes mellitus    Macrocytic anemia    BPH with obstruction/lower urinary tract symptoms     Patient is here for a chronic conditions follow up.    Reviewed labs 9/23     Urology Dr. Love BPH with luts sx.   Has frequency, incomplete emptying, dribbling and weak stream worsening over time. S/p urolift 2022-doing well since      Type 2 dm with retinopathy. Eye care Dr. Stout.  A1c 5.6. lipids at goal on pravachol 40. On asa 81mg.  urine ma wnl-not on ACE I     Podiatry Dr. DOREEN Son exam 2/22     Folate/b12 nl    mild normocytic anemia  Objective:      Physical Exam  Vitals and nursing note reviewed.   Constitutional:       Appearance: He is well-developed.   Cardiovascular:      Rate and Rhythm: Normal rate and regular rhythm.      Heart sounds: Normal heart sounds.   Pulmonary:      Effort: Pulmonary effort is normal.      Breath sounds: Normal breath sounds.   Skin:     General: Skin is warm and dry.   Neurological:      Mental Status: He is alert and oriented to person, place, and time.         Assessment:       1. Type 2 diabetes mellitus without complication, without long-term current use of insulin    2. Flu vaccine need    3. Hyperlipidemia associated with type 2 diabetes mellitus    4. Macrocytic  anemia    5. Other secondary gout, unspecified chronicity, unspecified site    6. BPH with obstruction/lower urinary tract symptoms    7. Prostate cancer screening        Plan:         1. Type 2 diabetes mellitus without complication, without long-term current use of insulin  Controlled on current medications.  Continue current medications.    - CBC Auto Differential; Future  - Comprehensive Metabolic Panel; Future  - Hemoglobin A1C; Future    2. Flu vaccine need  done    3. Hyperlipidemia associated with type 2 diabetes mellitus  Controlled on current medications.  Continue current medications.    - Lipid Panel; Future    4. Macrocytic anemia  Stable, no def    5. Other secondary gout, unspecified chronicity, unspecified site  Screen and treat as indicated:    - Uric Acid; Future    6. BPH with obstruction/lower urinary tract symptoms  controlled    7. Prostate cancer screening  Discussed benefits, risks and limitations of PSA testing and current USPSTF guidelines.  Patient expressed verbal understanding and wished to pursue screening.    - PSA, Screening; Future      Time spent with patient: 20 minutes    Patient with be reevaluated in 6 months or sooner prn    Greater than 50% of this visit was spent counseling as described in above documentation:Yes

## 2023-09-20 DIAGNOSIS — E11.69 HYPERLIPIDEMIA ASSOCIATED WITH TYPE 2 DIABETES MELLITUS: ICD-10-CM

## 2023-09-20 DIAGNOSIS — E78.5 HYPERLIPIDEMIA ASSOCIATED WITH TYPE 2 DIABETES MELLITUS: ICD-10-CM

## 2023-09-20 RX ORDER — PRAVASTATIN SODIUM 40 MG/1
TABLET ORAL
Qty: 90 TABLET | Refills: 3 | Status: SHIPPED | OUTPATIENT
Start: 2023-09-20

## 2023-09-20 NOTE — TELEPHONE ENCOUNTER
No care due was identified.  [facility] Embedded Care Due Messages. Reference number: [id number].   9/20/2023 1:28:11 PM CDT

## 2023-09-20 NOTE — TELEPHONE ENCOUNTER
Refill Decision Note   Sukumar Jasvir  is requesting a refill authorization.  Brief Assessment and Rationale for Refill:  Approve     Medication Therapy Plan:         Comments:     Note composed:6:17 PM 09/20/2023

## 2023-10-27 NOTE — ANESTHESIA PREPROCEDURE EVALUATION
11/03/2022  Sukumar Tay is a 72 y.o., male.      Pre-op Assessment    I have reviewed the Patient Summary Reports.     I have reviewed the Nursing Notes.       Review of Systems  Anesthesia Hx:  No problems with previous Anesthesia    Cardiovascular:  Cardiovascular Normal     Pulmonary:  Pulmonary Normal    Endocrine:   Diabetes        Physical Exam  General: Well nourished        Anesthesia Plan  Type of Anesthesia, risks & benefits discussed:    Anesthesia Type: Gen Natural Airway, Gen Supraglottic Airway  Intra-op Monitoring Plan: Standard ASA Monitors  Post Op Pain Control Plan: multimodal analgesia and IV/PO Opioids PRN  Induction:  IV  Informed Consent: Informed consent signed with the Patient and all parties understand the risks and agree with anesthesia plan.  All questions answered.   ASA Score: 2  Day of Surgery Review of History & Physical: H&P Update referred to the surgeon/provider.    Ready For Surgery From Anesthesia Perspective.     .       Yes

## 2023-11-09 LAB
LEFT EYE DM RETINOPATHY: NEGATIVE
RIGHT EYE DM RETINOPATHY: NEGATIVE

## 2023-11-21 ENCOUNTER — PATIENT OUTREACH (OUTPATIENT)
Dept: ADMINISTRATIVE | Facility: HOSPITAL | Age: 73
End: 2023-11-21
Payer: MEDICARE

## 2023-11-21 NOTE — PROGRESS NOTES
Population Health Chart Review & Patient Outreach Details    Outreach Performed: NO    Additional Pop Health Notes:           Updates Requested / Reviewed:      Updated Care Coordination Note, Care Everywhere, and Care Team Updated         Health Maintenance Topics Overdue:    Health Maintenance Due   Topic Date Due    TETANUS VACCINE  Never done    RSV Vaccine (Age 60+ and Pregnant patients) (1 - 1-dose 60+ series) Never done    Foot Exam  09/03/2020    Influenza Vaccine (1) 09/01/2023         Health Maintenance Topic(s) Outreach Outcomes & Actions Taken:    Eye Exam - Outreach Outcomes & Actions Taken  : Diabetic Eye External Records Uploaded, Care Team & History Updated if Applicable

## 2023-12-03 DIAGNOSIS — E11.9 TYPE 2 DIABETES MELLITUS WITHOUT COMPLICATION, WITHOUT LONG-TERM CURRENT USE OF INSULIN: ICD-10-CM

## 2023-12-03 NOTE — TELEPHONE ENCOUNTER
No care due was identified.  [facility] Embedded Care Due Messages. Reference number: [id number].   12/03/2023 4:17:12 AM CST

## 2023-12-04 RX ORDER — METFORMIN HYDROCHLORIDE 500 MG/1
TABLET ORAL
Qty: 90 TABLET | Refills: 1 | Status: SHIPPED | OUTPATIENT
Start: 2023-12-04

## 2023-12-04 NOTE — TELEPHONE ENCOUNTER
Refill Routing Note   Medication(s) are not appropriate for processing by [facility] for the following reason(s):      Drug-disease interaction    [facility] action(s):  Defer Care Due:  None identified     Medication Therapy Plan: metFORMIN and BPH with obstruction/lower urinary tract symptoms    Pharmacist review requested: Yes     Appointments  past 12m or future 3m with PCP    Date Provider   Last Visit   9/19/2023 Petrona Rincon MD   Next Visit   Visit date not found Petrona Rincon MD   ED visits in past 90 days: 0        Note composed:7:59 AM 12/04/2023

## 2023-12-04 NOTE — TELEPHONE ENCOUNTER
Refill Decision Note   Sukumar Tay  is requesting a refill authorization.  Brief Assessment and Rationale for Refill:  Approve     Medication Therapy Plan:         Pharmacist review requested: Yes   Comments:     Note composed:10:42 AM 12/04/2023

## 2024-06-03 ENCOUNTER — LAB VISIT (OUTPATIENT)
Dept: LAB | Facility: HOSPITAL | Age: 74
End: 2024-06-03
Attending: FAMILY MEDICINE
Payer: MEDICARE

## 2024-06-03 DIAGNOSIS — E11.9 TYPE 2 DIABETES MELLITUS WITHOUT COMPLICATION, WITHOUT LONG-TERM CURRENT USE OF INSULIN: ICD-10-CM

## 2024-06-03 DIAGNOSIS — M10.40 OTHER SECONDARY GOUT, UNSPECIFIED CHRONICITY, UNSPECIFIED SITE: ICD-10-CM

## 2024-06-03 DIAGNOSIS — E78.5 HYPERLIPIDEMIA ASSOCIATED WITH TYPE 2 DIABETES MELLITUS: ICD-10-CM

## 2024-06-03 DIAGNOSIS — E11.69 HYPERLIPIDEMIA ASSOCIATED WITH TYPE 2 DIABETES MELLITUS: ICD-10-CM

## 2024-06-03 DIAGNOSIS — Z12.5 PROSTATE CANCER SCREENING: ICD-10-CM

## 2024-06-03 LAB
ALBUMIN SERPL BCP-MCNC: 3.9 G/DL (ref 3.5–5.2)
ALP SERPL-CCNC: 52 U/L (ref 55–135)
ALT SERPL W/O P-5'-P-CCNC: 15 U/L (ref 10–44)
ANION GAP SERPL CALC-SCNC: 7 MMOL/L (ref 8–16)
AST SERPL-CCNC: 24 U/L (ref 10–40)
BASOPHILS # BLD AUTO: 0.03 K/UL (ref 0–0.2)
BASOPHILS NFR BLD: 0.5 % (ref 0–1.9)
BILIRUB SERPL-MCNC: 0.5 MG/DL (ref 0.1–1)
BUN SERPL-MCNC: 24 MG/DL (ref 8–23)
CALCIUM SERPL-MCNC: 9.6 MG/DL (ref 8.7–10.5)
CHLORIDE SERPL-SCNC: 107 MMOL/L (ref 95–110)
CHOLEST SERPL-MCNC: 125 MG/DL (ref 120–199)
CHOLEST/HDLC SERPL: 3.1 {RATIO} (ref 2–5)
CO2 SERPL-SCNC: 27 MMOL/L (ref 23–29)
COMPLEXED PSA SERPL-MCNC: 1.9 NG/ML (ref 0–4)
CREAT SERPL-MCNC: 1.1 MG/DL (ref 0.5–1.4)
DIFFERENTIAL METHOD BLD: ABNORMAL
EOSINOPHIL # BLD AUTO: 0.1 K/UL (ref 0–0.5)
EOSINOPHIL NFR BLD: 1.9 % (ref 0–8)
ERYTHROCYTE [DISTWIDTH] IN BLOOD BY AUTOMATED COUNT: 12.8 % (ref 11.5–14.5)
EST. GFR  (NO RACE VARIABLE): >60 ML/MIN/1.73 M^2
ESTIMATED AVG GLUCOSE: 126 MG/DL (ref 68–131)
GLUCOSE SERPL-MCNC: 115 MG/DL (ref 70–110)
HBA1C MFR BLD: 6 % (ref 4–5.6)
HCT VFR BLD AUTO: 41.1 % (ref 40–54)
HDLC SERPL-MCNC: 40 MG/DL (ref 40–75)
HDLC SERPL: 32 % (ref 20–50)
HGB BLD-MCNC: 13.4 G/DL (ref 14–18)
IMM GRANULOCYTES # BLD AUTO: 0.02 K/UL (ref 0–0.04)
IMM GRANULOCYTES NFR BLD AUTO: 0.3 % (ref 0–0.5)
LDLC SERPL CALC-MCNC: 76.2 MG/DL (ref 63–159)
LYMPHOCYTES # BLD AUTO: 1.4 K/UL (ref 1–4.8)
LYMPHOCYTES NFR BLD: 22.3 % (ref 18–48)
MCH RBC QN AUTO: 31.4 PG (ref 27–31)
MCHC RBC AUTO-ENTMCNC: 32.6 G/DL (ref 32–36)
MCV RBC AUTO: 96 FL (ref 82–98)
MONOCYTES # BLD AUTO: 0.5 K/UL (ref 0.3–1)
MONOCYTES NFR BLD: 8.4 % (ref 4–15)
NEUTROPHILS # BLD AUTO: 4.1 K/UL (ref 1.8–7.7)
NEUTROPHILS NFR BLD: 66.6 % (ref 38–73)
NONHDLC SERPL-MCNC: 85 MG/DL
NRBC BLD-RTO: 0 /100 WBC
PLATELET # BLD AUTO: 202 K/UL (ref 150–450)
PMV BLD AUTO: 9.1 FL (ref 9.2–12.9)
POTASSIUM SERPL-SCNC: 4.8 MMOL/L (ref 3.5–5.1)
PROT SERPL-MCNC: 7.1 G/DL (ref 6–8.4)
RBC # BLD AUTO: 4.27 M/UL (ref 4.6–6.2)
SODIUM SERPL-SCNC: 141 MMOL/L (ref 136–145)
TRIGL SERPL-MCNC: 44 MG/DL (ref 30–150)
URATE SERPL-MCNC: 6.4 MG/DL (ref 3.4–7)
WBC # BLD AUTO: 6.2 K/UL (ref 3.9–12.7)

## 2024-06-03 PROCEDURE — 84550 ASSAY OF BLOOD/URIC ACID: CPT | Mod: HCNC | Performed by: FAMILY MEDICINE

## 2024-06-03 PROCEDURE — 80061 LIPID PANEL: CPT | Mod: HCNC | Performed by: FAMILY MEDICINE

## 2024-06-03 PROCEDURE — 83036 HEMOGLOBIN GLYCOSYLATED A1C: CPT | Mod: HCNC | Performed by: FAMILY MEDICINE

## 2024-06-03 PROCEDURE — 36415 COLL VENOUS BLD VENIPUNCTURE: CPT | Mod: HCNC,PO | Performed by: FAMILY MEDICINE

## 2024-06-03 PROCEDURE — 84153 ASSAY OF PSA TOTAL: CPT | Mod: HCNC | Performed by: FAMILY MEDICINE

## 2024-06-03 PROCEDURE — 80053 COMPREHEN METABOLIC PANEL: CPT | Mod: HCNC | Performed by: FAMILY MEDICINE

## 2024-06-03 PROCEDURE — 85025 COMPLETE CBC W/AUTO DIFF WBC: CPT | Mod: HCNC | Performed by: FAMILY MEDICINE

## 2024-06-11 ENCOUNTER — OFFICE VISIT (OUTPATIENT)
Dept: FAMILY MEDICINE | Facility: CLINIC | Age: 74
End: 2024-06-11
Payer: MEDICARE

## 2024-06-11 VITALS
HEART RATE: 59 BPM | RESPIRATION RATE: 10 BRPM | WEIGHT: 124.56 LBS | SYSTOLIC BLOOD PRESSURE: 110 MMHG | TEMPERATURE: 98 F | HEIGHT: 63 IN | BODY MASS INDEX: 22.07 KG/M2 | OXYGEN SATURATION: 100 % | DIASTOLIC BLOOD PRESSURE: 60 MMHG

## 2024-06-11 DIAGNOSIS — E78.5 HYPERLIPIDEMIA ASSOCIATED WITH TYPE 2 DIABETES MELLITUS: ICD-10-CM

## 2024-06-11 DIAGNOSIS — M10.40 OTHER SECONDARY GOUT, UNSPECIFIED CHRONICITY, UNSPECIFIED SITE: ICD-10-CM

## 2024-06-11 DIAGNOSIS — L72.3 INFECTED SEBACEOUS CYST: ICD-10-CM

## 2024-06-11 DIAGNOSIS — N40.1 BPH WITH OBSTRUCTION/LOWER URINARY TRACT SYMPTOMS: ICD-10-CM

## 2024-06-11 DIAGNOSIS — E11.9 TYPE 2 DIABETES MELLITUS WITHOUT COMPLICATION, WITHOUT LONG-TERM CURRENT USE OF INSULIN: Primary | ICD-10-CM

## 2024-06-11 DIAGNOSIS — D53.9 MACROCYTIC ANEMIA: ICD-10-CM

## 2024-06-11 DIAGNOSIS — N13.8 BPH WITH OBSTRUCTION/LOWER URINARY TRACT SYMPTOMS: ICD-10-CM

## 2024-06-11 DIAGNOSIS — E11.69 HYPERLIPIDEMIA ASSOCIATED WITH TYPE 2 DIABETES MELLITUS: ICD-10-CM

## 2024-06-11 DIAGNOSIS — L08.9 INFECTED SEBACEOUS CYST: ICD-10-CM

## 2024-06-11 PROCEDURE — 3288F FALL RISK ASSESSMENT DOCD: CPT | Mod: HCNC,CPTII,S$GLB, | Performed by: FAMILY MEDICINE

## 2024-06-11 PROCEDURE — 1159F MED LIST DOCD IN RCRD: CPT | Mod: HCNC,CPTII,S$GLB, | Performed by: FAMILY MEDICINE

## 2024-06-11 PROCEDURE — 1101F PT FALLS ASSESS-DOCD LE1/YR: CPT | Mod: HCNC,CPTII,S$GLB, | Performed by: FAMILY MEDICINE

## 2024-06-11 PROCEDURE — 99999 PR PBB SHADOW E&M-EST. PATIENT-LVL III: CPT | Mod: PBBFAC,HCNC,, | Performed by: FAMILY MEDICINE

## 2024-06-11 PROCEDURE — 3074F SYST BP LT 130 MM HG: CPT | Mod: HCNC,CPTII,S$GLB, | Performed by: FAMILY MEDICINE

## 2024-06-11 PROCEDURE — 3008F BODY MASS INDEX DOCD: CPT | Mod: HCNC,CPTII,S$GLB, | Performed by: FAMILY MEDICINE

## 2024-06-11 PROCEDURE — 99214 OFFICE O/P EST MOD 30 MIN: CPT | Mod: HCNC,S$GLB,, | Performed by: FAMILY MEDICINE

## 2024-06-11 PROCEDURE — 3078F DIAST BP <80 MM HG: CPT | Mod: HCNC,CPTII,S$GLB, | Performed by: FAMILY MEDICINE

## 2024-06-11 PROCEDURE — 1126F AMNT PAIN NOTED NONE PRSNT: CPT | Mod: HCNC,CPTII,S$GLB, | Performed by: FAMILY MEDICINE

## 2024-06-11 PROCEDURE — 3044F HG A1C LEVEL LT 7.0%: CPT | Mod: HCNC,CPTII,S$GLB, | Performed by: FAMILY MEDICINE

## 2024-06-11 PROCEDURE — G2211 COMPLEX E/M VISIT ADD ON: HCPCS | Mod: HCNC,S$GLB,, | Performed by: FAMILY MEDICINE

## 2024-06-11 PROCEDURE — 1160F RVW MEDS BY RX/DR IN RCRD: CPT | Mod: HCNC,CPTII,S$GLB, | Performed by: FAMILY MEDICINE

## 2024-06-11 RX ORDER — ASPIRIN 81 MG/1
81 TABLET ORAL DAILY
COMMUNITY
Start: 2024-06-11 | End: 2025-06-11

## 2024-06-11 RX ORDER — DOXYCYCLINE 100 MG/1
100 CAPSULE ORAL EVERY 12 HOURS
Qty: 10 CAPSULE | Refills: 0 | Status: SHIPPED | OUTPATIENT
Start: 2024-06-11

## 2024-06-11 NOTE — PROGRESS NOTES
Subjective:       Patient ID: Sukumar Tay is a 73 y.o. male.    Chief Complaint: Annual Exam    HPI  Review of Systems   Constitutional:  Negative for fatigue and unexpected weight change.   Respiratory:  Negative for chest tightness and shortness of breath.    Cardiovascular:  Negative for chest pain, palpitations and leg swelling.   Gastrointestinal:  Negative for abdominal pain.   Musculoskeletal:  Negative for arthralgias.   Neurological:  Negative for dizziness, syncope, light-headedness and headaches.       Patient Active Problem List   Diagnosis    Type 2 diabetes mellitus without complication, without long-term current use of insulin    Gout    Hyperlipidemia associated with type 2 diabetes mellitus    Macrocytic anemia    BPH with obstruction/lower urinary tract symptoms     Patient is here for a chronic conditions follow up.    Reviewed labs 6/24  C/o cyst right chest wall and was big, swollen, red and painful. Getting better. Refuses surgery referral    Derm Dr. Weil rosacea     Urology Dr. Love BPH with luts sx.   Has frequency, incomplete emptying, dribbling and weak stream worsening over time. S/p urolift 2022-doing well since      Endo Type 2 dm with retinopathy. Eye care Dr. Stout.  A1c 6.0. lipids at goal on pravachol 40. On asa 81mg.  urine ma wnl-not on ACE I. Was on a 40 day cruise and ate very well     Podiatry Dr. LOGAN Mancil exam 2/22     Heme Folate/b12 nl    mild normocytic anemia    GI  [place] colonoscopy 2017 on 10 year surveillance  Objective:      Physical Exam  Vitals and nursing note reviewed.   Constitutional:       Appearance: He is well-developed.   Cardiovascular:      Rate and Rhythm: Normal rate and regular rhythm.      Heart sounds: Normal heart sounds.   Pulmonary:      Effort: Pulmonary effort is normal.      Breath sounds: Normal breath sounds.   Skin:     General: Skin is warm and dry.   Neurological:      Mental Status: He is alert and oriented to person, place,  and time.         Assessment:       1. Type 2 diabetes mellitus without complication, without long-term current use of insulin    2. Hyperlipidemia associated with type 2 diabetes mellitus    3. Other secondary gout, unspecified chronicity, unspecified site    4. BPH with obstruction/lower urinary tract symptoms    5. Macrocytic anemia    6. Infected sebaceous cyst        Plan:         1. Type 2 diabetes mellitus without complication, without long-term current use of insulin  Work on diabetic diet.  Stable condition.  Continue current medications.  Will adjust based on lab findings or if condition changes.  continue  - aspirin (ECOTRIN) 81 MG EC tablet; Take 1 tablet (81 mg total) by mouth once daily.  - CBC Auto Differential; Future  - Comprehensive Metabolic Panel; Future  - Hemoglobin A1C; Future  - Microalbumin/Creatinine Ratio, Urine; Future    2. Hyperlipidemia associated with type 2 diabetes mellitus  Controlled on current medications.  Continue current medications.    - Lipid Panel; Future    3. Other secondary gout, unspecified chronicity, unspecified site  Controlled with diet. monitor    4. BPH with obstruction/lower urinary tract symptoms  Cont monitoring    5. Macrocytic anemia  Screen and treat as indicated:    - Vitamin B12; Future  - FOLATE; Future    6. Infected sebaceous cyst  Cleanse wound twice daily.  Apply antibiotic ointment and sterile bandage twice daily and as needed.  Monitor for increased pain, pus, redness and swelling.  If occurs then return to clinic or go to the emergency room if clinic is closed for a recheck.      - doxycycline (VIBRAMYCIN) 100 MG Cap; Take 1 capsule (100 mg total) by mouth every 12 (twelve) hours.  Dispense: 10 capsule; Refill: 0      Time spent with patient: 20 minutes    Patient with be reevaluated in 6 months or sooner prn    Greater than 50% of this visit was spent counseling as described in above documentation:Yes

## 2024-06-19 ENCOUNTER — PATIENT MESSAGE (OUTPATIENT)
Dept: FAMILY MEDICINE | Facility: CLINIC | Age: 74
End: 2024-06-19
Payer: MEDICARE

## 2024-09-24 LAB
LEFT EYE DM RETINOPATHY: POSITIVE
RIGHT EYE DM RETINOPATHY: POSITIVE

## 2024-09-25 DIAGNOSIS — E78.5 HYPERLIPIDEMIA ASSOCIATED WITH TYPE 2 DIABETES MELLITUS: ICD-10-CM

## 2024-09-25 DIAGNOSIS — E11.69 HYPERLIPIDEMIA ASSOCIATED WITH TYPE 2 DIABETES MELLITUS: ICD-10-CM

## 2024-09-25 RX ORDER — PRAVASTATIN SODIUM 40 MG/1
40 TABLET ORAL NIGHTLY
Qty: 90 TABLET | Refills: 2 | Status: SHIPPED | OUTPATIENT
Start: 2024-09-25

## 2024-09-25 NOTE — TELEPHONE ENCOUNTER
Care Due:                  Date            Visit Type   Department     Provider  --------------------------------------------------------------------------------                                MYCHART                              ANNUAL                              CHECKUP/PHY  [facility]  Last Visit: 06-      S            MEDICINE       Petrona Rincon                              EP -                              PRIMARY      [facility]  Next Visit: 12-      CARE ([facility])   MEDICINE       Petrona Rincon                                                            Last  Test          Frequency    Reason                     Performed    Due Date  --------------------------------------------------------------------------------    HBA1C.......  6 months...  metFORMIN................  06- 11-    [facility] Embedded Care Due Messages. Reference number: [id number].   9/25/2024 2:29:13 AM CDT

## 2024-09-26 NOTE — TELEPHONE ENCOUNTER
Refill Decision Note   Sukumar Jasvir  is requesting a refill authorization.  Brief Assessment and Rationale for Refill:  Approve     Medication Therapy Plan:  LAILA; FLOS      Comments:     Note composed:7:41 PM 09/25/2024

## 2024-10-01 ENCOUNTER — PATIENT OUTREACH (OUTPATIENT)
Dept: ADMINISTRATIVE | Facility: HOSPITAL | Age: 74
End: 2024-10-01
Payer: MEDICARE

## 2024-10-01 NOTE — PROGRESS NOTES
Population Health Chart Review & Patient Outreach Details      Additional [facility] Health Notes:               Updates Requested / Reviewed:      Updated Care Coordination Note, Care Everywhere, and Immunizations Reconciliation Completed or Queried: [place]         Health Maintenance Topics Overdue:      [facility] Score: 1     Foot Exam    Influenza Vaccine  Tetanus Vaccine                  Health Maintenance Topic(s) Outreach Outcomes & Actions Taken:    Eye Exam - Outreach Outcomes & Actions Taken  : Diabetic Eye External Records Uploaded, Care Team & History Updated if Applicable

## 2024-10-11 LAB
LEFT EYE DM RETINOPATHY: NEGATIVE
RIGHT EYE DM RETINOPATHY: NEGATIVE

## 2024-10-15 ENCOUNTER — PATIENT OUTREACH (OUTPATIENT)
Dept: ADMINISTRATIVE | Facility: HOSPITAL | Age: 74
End: 2024-10-15
Payer: MEDICARE

## 2024-10-15 NOTE — PROGRESS NOTES
Population Health Chart Review & Patient Outreach Details      Additional Pop Health Notes:               Updates Requested / Reviewed:      Updated Care Coordination Note and          Health Maintenance Topics Overdue:      [facility] Score: 1     Foot Exam    Influenza Vaccine  Tetanus Vaccine                  Health Maintenance Topic(s) Outreach Outcomes & Actions Taken:    Eye Exam - Outreach Outcomes & Actions Taken  : Diabetic Eye External Records Uploaded, Care Team & History Updated if Applicable

## 2024-12-10 ENCOUNTER — LAB VISIT (OUTPATIENT)
Dept: LAB | Facility: HOSPITAL | Age: 74
End: 2024-12-10
Attending: FAMILY MEDICINE
Payer: MEDICARE

## 2024-12-10 DIAGNOSIS — D53.9 MACROCYTIC ANEMIA: ICD-10-CM

## 2024-12-10 DIAGNOSIS — E11.69 HYPERLIPIDEMIA ASSOCIATED WITH TYPE 2 DIABETES MELLITUS: ICD-10-CM

## 2024-12-10 DIAGNOSIS — E78.5 HYPERLIPIDEMIA ASSOCIATED WITH TYPE 2 DIABETES MELLITUS: ICD-10-CM

## 2024-12-10 DIAGNOSIS — E11.9 TYPE 2 DIABETES MELLITUS WITHOUT COMPLICATION, WITHOUT LONG-TERM CURRENT USE OF INSULIN: ICD-10-CM

## 2024-12-10 LAB
ALBUMIN SERPL BCP-MCNC: 3.9 G/DL (ref 3.5–5.2)
ALP SERPL-CCNC: 57 U/L (ref 40–150)
ALT SERPL W/O P-5'-P-CCNC: 14 U/L (ref 10–44)
ANION GAP SERPL CALC-SCNC: 7 MMOL/L (ref 8–16)
AST SERPL-CCNC: 25 U/L (ref 10–40)
BASOPHILS # BLD AUTO: 0.03 K/UL (ref 0–0.2)
BASOPHILS NFR BLD: 0.5 % (ref 0–1.9)
BILIRUB SERPL-MCNC: 0.5 MG/DL (ref 0.1–1)
BUN SERPL-MCNC: 30 MG/DL (ref 8–23)
CALCIUM SERPL-MCNC: 9.1 MG/DL (ref 8.7–10.5)
CHLORIDE SERPL-SCNC: 105 MMOL/L (ref 95–110)
CHOLEST SERPL-MCNC: 121 MG/DL (ref 120–199)
CHOLEST/HDLC SERPL: 3.2 {RATIO} (ref 2–5)
CO2 SERPL-SCNC: 25 MMOL/L (ref 23–29)
CREAT SERPL-MCNC: 1.2 MG/DL (ref 0.5–1.4)
DIFFERENTIAL METHOD BLD: ABNORMAL
EOSINOPHIL # BLD AUTO: 0.2 K/UL (ref 0–0.5)
EOSINOPHIL NFR BLD: 2.9 % (ref 0–8)
ERYTHROCYTE [DISTWIDTH] IN BLOOD BY AUTOMATED COUNT: 12.2 % (ref 11.5–14.5)
EST. GFR  (NO RACE VARIABLE): >60 ML/MIN/1.73 M^2
ESTIMATED AVG GLUCOSE: 114 MG/DL (ref 68–131)
FOLATE SERPL-MCNC: 10.2 NG/ML (ref 4–24)
GLUCOSE SERPL-MCNC: 116 MG/DL (ref 70–110)
HBA1C MFR BLD: 5.6 % (ref 4–5.6)
HCT VFR BLD AUTO: 39.4 % (ref 40–54)
HDLC SERPL-MCNC: 38 MG/DL (ref 40–75)
HDLC SERPL: 31.4 % (ref 20–50)
HGB BLD-MCNC: 13.2 G/DL (ref 14–18)
IMM GRANULOCYTES # BLD AUTO: 0.01 K/UL (ref 0–0.04)
IMM GRANULOCYTES NFR BLD AUTO: 0.2 % (ref 0–0.5)
LDLC SERPL CALC-MCNC: 69.8 MG/DL (ref 63–159)
LYMPHOCYTES # BLD AUTO: 2 K/UL (ref 1–4.8)
LYMPHOCYTES NFR BLD: 33.5 % (ref 18–48)
MCH RBC QN AUTO: 32.3 PG (ref 27–31)
MCHC RBC AUTO-ENTMCNC: 33.5 G/DL (ref 32–36)
MCV RBC AUTO: 96 FL (ref 82–98)
MONOCYTES # BLD AUTO: 0.5 K/UL (ref 0.3–1)
MONOCYTES NFR BLD: 8.9 % (ref 4–15)
NEUTROPHILS # BLD AUTO: 3.2 K/UL (ref 1.8–7.7)
NEUTROPHILS NFR BLD: 54 % (ref 38–73)
NONHDLC SERPL-MCNC: 83 MG/DL
NRBC BLD-RTO: 0 /100 WBC
PLATELET # BLD AUTO: 194 K/UL (ref 150–450)
PMV BLD AUTO: 9.2 FL (ref 9.2–12.9)
POTASSIUM SERPL-SCNC: 5 MMOL/L (ref 3.5–5.1)
PROT SERPL-MCNC: 7 G/DL (ref 6–8.4)
RBC # BLD AUTO: 4.09 M/UL (ref 4.6–6.2)
SODIUM SERPL-SCNC: 137 MMOL/L (ref 136–145)
TRIGL SERPL-MCNC: 66 MG/DL (ref 30–150)
VIT B12 SERPL-MCNC: 290 PG/ML (ref 210–950)
WBC # BLD AUTO: 5.94 K/UL (ref 3.9–12.7)

## 2024-12-10 PROCEDURE — 80053 COMPREHEN METABOLIC PANEL: CPT | Mod: HCNC | Performed by: FAMILY MEDICINE

## 2024-12-10 PROCEDURE — 80061 LIPID PANEL: CPT | Mod: HCNC | Performed by: FAMILY MEDICINE

## 2024-12-10 PROCEDURE — 82746 ASSAY OF FOLIC ACID SERUM: CPT | Mod: HCNC | Performed by: FAMILY MEDICINE

## 2024-12-10 PROCEDURE — 82607 VITAMIN B-12: CPT | Mod: HCNC | Performed by: FAMILY MEDICINE

## 2024-12-10 PROCEDURE — 85025 COMPLETE CBC W/AUTO DIFF WBC: CPT | Mod: HCNC | Performed by: FAMILY MEDICINE

## 2024-12-10 PROCEDURE — 83036 HEMOGLOBIN GLYCOSYLATED A1C: CPT | Mod: HCNC | Performed by: FAMILY MEDICINE

## 2024-12-10 PROCEDURE — 36415 COLL VENOUS BLD VENIPUNCTURE: CPT | Mod: HCNC,PO | Performed by: FAMILY MEDICINE

## 2024-12-16 NOTE — TELEPHONE ENCOUNTER
HM UPDATE   [facility]   Physical Therapy Treatment Note     Name: Kimberly Pearce  Clinic Number: [id number]    Therapy Diagnosis:   Encounter Diagnoses   Name Primary?    Neck pain Yes    Cervical radiculopathy     Diffuse pain in right upper extremity     Muscle weakness      Physician: Marina Whitfield PA-C    Visit Date: 12/17/2024    Physician Orders: PT Eval and Treat   Medical Diagnosis from Referral: M54.12 (ICD-10-CM) - Cervical radiculopathy   Evaluation Date: 12/3/2024  Authorization Period Expiration: 12-31-24  Plan of Care Expiration: 1-28-25  Progress Note Due: 1-3-25  Visit # / Visits authorized: 5/13     FOTO goal 66 11 visits  Foto  Date  Score    #1/3 12/3/2024 46   #2/3       #3/3             MD Follow up appointment: none scheduled            Precautions: scoliosis        PTA Visit #: 0/5     Time In: 9:02  Time Out: 9:45  Total Billable Time: 43  minutes    SUBJECTIVE     Pt reports: some scapula soreness and mild neck pain  Pt states adjusting to new pillow   Pt was compliant with home exercise program.  Response to previous treatment: felt ok  Functional change: can sleep better with new pillow     Pain: 0/10 in neck at rest 1/10 with movement  and 2/10 in scapula nothing down arm or in shoulder  at end 1/10 neck and scapula     Location: right side of neck into shoulder shooting pain to mid forearm R      MRI1.  There is degenerative change discussed in detail by level above.  There is no fracture.  These findings however are present in the setting of a spinal canal which may be borderline small on a developmental basis.  The most significant abnormality is present at the C5-6 level where there is a right paracentral disc extrusion with cephalad extension contributing to flattening of the right ventral cord surface, severe right lateral recess and proximal foraminal stenosis, moderate spinal stenosis.  Cord signal is grossly normal.  The study however is motion degraded which  does limit evaluation of cord signal.     2.  At the C6-7 level there is a broad left paracentral disc protrusion which flattens the left side of the cord and contributes to mild spinal stenosis and crowding of the left lateral recess.     3.  At the C4-5 level there is a broad right paracentral disc protrusion which flattens the right ventral cord surface and contributes to mild spinal canal and foraminal stenosis.       OBJECTIVE     At last session Cervical rotation after ROM ex 70 R  10 L at previous session 60 R and 70 L     Treatment     Kimberly received the treatments listed below:      Therapeutic exercises were performed to improve ROM, strength, flexibility and stabilization for 13 minutes.         Start with chin tuck and neck flexion perform all ex and scap stab and then end with chin tuck and neck flexion last continue q 2 hours flexion principles or at onset of increased symptoms   Cervical rotation x 10    neuromuscular re-education activities to improve: posture and to decrease discogenic signs and symptoms for 15 minutes. The following activities were included:  Instructed pt further in proper posture and need to work on   Chin tuck x 10   Neck flexion x 10     Protraction x 20 supine with 2#   [place] with retraction with YTB x 15  Retraction with YTB x 10 stopped with fatigue   ER with YTB x 20 B  Provided pt YTB for home use    Instructed pt to increase sets per day to help build endurance     April shoulder taping #1,2,3 was performed to provide support to scapula and shoulder to decrease pain and improve functional use of UE.  Instructed pt in use, care and precautions with tape.       manual therapy techniques: soft tissue mobilization  were applied to the: neck and upper trap and levator scapulae and suboccipital for 15 minutes, including:  STM suboccipital release mod-severe tightness levator scapulae and min- mod suboccipital  Provided info on theracane for scapula  STM    (NP)therapeutic activities to improve functional performance for 0  minutes, including:    Instructed pt in role of levator scapulae with FH position and need to do more unloading   Kenny cards but probably wrapping to keep to minimum     Reviewed sleep position and pt to borrow sister's pillow to try also Pt with another pillow that is better than previous pillow  Instructed pt further in unloading and to continue with q 2 hour ex or at onset of symptoms     Patient Education and Home Exercises     Home Exercises Provided and Patient Education Provided     Education provided:   - flexion/extension principles  - HEP   - stretch to point of tightness not pain   - exercise in pain free zone       Written Home Exercises Provided: continue prior HEP . Exercises were reviewed and Kimberly was able to demonstrate them prior to the end of the session.  Kimberly demonstrated good understanding of the education provided. See EMR under Patient Instructions for exercises provided during therapy sessions    ASSESSMENT     Continue with similar symptoms no radicular symptoms Pt progressing with endurance with scap stab Pt may benefit from Chen taping Pt juliet treatment well and decreased tightness levator scapulae noted at end of session    Kimberly Is progressing well towards her goals.     Pt prognosis is Good.   Rehab potential:  Good    Pt will continue to benefit from skilled outpatient physical therapy to address the goals listed in the initial evaluation, provide pt/family education and to maximize pt's level of independence in the home and community environment.     Pt's spiritual, cultural and educational needs considered and pt agreeable to plan of care and goals.     Anticipated barriers to physical therapy: none    GOALS:   Short Term Goals:  4 weeks (Progressing, not met)  Increase range of motion 25%  Increase strength 1/2 muscle grade  Increase postural awareness to normal  Be able to perform HEP with  minimal cueing required     Long Term Goals: 8 weeks (Progressing, not met)  Increase range of motion to 75%to 100% of full  Increase strength 1 muscle grade  Improve scapular stabilization to normal  Restore normal sleep habits without disturbances due to pain  Restore ability to drive without increased pain  Restore ability to participate in an exercise program for Wellness   Restore ability to perform ADL's and household activities independently and without increased pain  Pt to be independent with HEP to improve ROM and independence with ADL's    PLAN   Continue with established plan of care towards PT goals.      Maintain flexion principles  Consider cervical traction as needed  Consider levator scapulae stretch     Melonie Oconnor, PT

## 2024-12-17 ENCOUNTER — OFFICE VISIT (OUTPATIENT)
Dept: FAMILY MEDICINE | Facility: CLINIC | Age: 74
End: 2024-12-17
Payer: MEDICARE

## 2024-12-17 VITALS
HEIGHT: 63 IN | SYSTOLIC BLOOD PRESSURE: 110 MMHG | HEART RATE: 60 BPM | OXYGEN SATURATION: 96 % | RESPIRATION RATE: 12 BRPM | DIASTOLIC BLOOD PRESSURE: 60 MMHG | BODY MASS INDEX: 23.28 KG/M2 | WEIGHT: 131.38 LBS | TEMPERATURE: 98 F

## 2024-12-17 DIAGNOSIS — E78.5 HYPERLIPIDEMIA ASSOCIATED WITH TYPE 2 DIABETES MELLITUS: ICD-10-CM

## 2024-12-17 DIAGNOSIS — E11.9 TYPE 2 DIABETES MELLITUS WITHOUT COMPLICATION, WITHOUT LONG-TERM CURRENT USE OF INSULIN: ICD-10-CM

## 2024-12-17 DIAGNOSIS — E53.8 B12 DEFICIENCY: Primary | ICD-10-CM

## 2024-12-17 DIAGNOSIS — M19.049 HAND ARTHRITIS: ICD-10-CM

## 2024-12-17 DIAGNOSIS — E11.69 HYPERLIPIDEMIA ASSOCIATED WITH TYPE 2 DIABETES MELLITUS: ICD-10-CM

## 2024-12-17 PROCEDURE — 1126F AMNT PAIN NOTED NONE PRSNT: CPT | Mod: HCNC,CPTII,S$GLB, | Performed by: FAMILY MEDICINE

## 2024-12-17 PROCEDURE — 3288F FALL RISK ASSESSMENT DOCD: CPT | Mod: HCNC,CPTII,S$GLB, | Performed by: FAMILY MEDICINE

## 2024-12-17 PROCEDURE — 1160F RVW MEDS BY RX/DR IN RCRD: CPT | Mod: HCNC,CPTII,S$GLB, | Performed by: FAMILY MEDICINE

## 2024-12-17 PROCEDURE — 3078F DIAST BP <80 MM HG: CPT | Mod: HCNC,CPTII,S$GLB, | Performed by: FAMILY MEDICINE

## 2024-12-17 PROCEDURE — 3074F SYST BP LT 130 MM HG: CPT | Mod: HCNC,CPTII,S$GLB, | Performed by: FAMILY MEDICINE

## 2024-12-17 PROCEDURE — 99214 OFFICE O/P EST MOD 30 MIN: CPT | Mod: HCNC,S$GLB,, | Performed by: FAMILY MEDICINE

## 2024-12-17 PROCEDURE — 1101F PT FALLS ASSESS-DOCD LE1/YR: CPT | Mod: HCNC,CPTII,S$GLB, | Performed by: FAMILY MEDICINE

## 2024-12-17 PROCEDURE — 1159F MED LIST DOCD IN RCRD: CPT | Mod: HCNC,CPTII,S$GLB, | Performed by: FAMILY MEDICINE

## 2024-12-17 PROCEDURE — G2211 COMPLEX E/M VISIT ADD ON: HCPCS | Mod: HCNC,S$GLB,, | Performed by: FAMILY MEDICINE

## 2024-12-17 PROCEDURE — 3044F HG A1C LEVEL LT 7.0%: CPT | Mod: HCNC,CPTII,S$GLB, | Performed by: FAMILY MEDICINE

## 2024-12-17 PROCEDURE — 3008F BODY MASS INDEX DOCD: CPT | Mod: HCNC,CPTII,S$GLB, | Performed by: FAMILY MEDICINE

## 2024-12-17 PROCEDURE — 99999 PR PBB SHADOW E&M-EST. PATIENT-LVL III: CPT | Mod: PBBFAC,HCNC,, | Performed by: FAMILY MEDICINE

## 2024-12-17 PROCEDURE — 3066F NEPHROPATHY DOC TX: CPT | Mod: HCNC,CPTII,S$GLB, | Performed by: FAMILY MEDICINE

## 2024-12-17 PROCEDURE — 2023F DILAT RTA XM W/O RTNOPTHY: CPT | Mod: HCNC,CPTII,S$GLB, | Performed by: FAMILY MEDICINE

## 2024-12-17 PROCEDURE — 3061F NEG MICROALBUMINURIA REV: CPT | Mod: HCNC,CPTII,S$GLB, | Performed by: FAMILY MEDICINE

## 2024-12-17 RX ORDER — CELECOXIB 200 MG/1
200 CAPSULE ORAL DAILY
COMMUNITY
Start: 2024-12-17

## 2024-12-17 RX ORDER — CHOLECALCIFEROL (VITAMIN D3) 25 MCG
1000 TABLET,CHEWABLE ORAL DAILY
COMMUNITY
Start: 2024-12-17

## 2024-12-17 NOTE — PROGRESS NOTES
Subjective:       Patient ID: Sukumar Tay is a 74 y.o. male.    Chief Complaint: Follow-up (6mth f/u)    Patient Active Problem List   Diagnosis    Type 2 diabetes mellitus without complication, without long-term current use of insulin    Gout    Hyperlipidemia associated with type 2 diabetes mellitus    Macrocytic anemia    BPH with obstruction/lower urinary tract symptoms     Patient is here for a chronic conditions follow up.    Reviewed labs 12/2024   History of Present Illness    CHIEF COMPLAINT:  Mr. Tay presents today for follow up.    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 He reports partial improvement with generic Celecoxib.    LABS:  B12 levels are low and decreasing. A1c is 5.6 with a fasting glucose of 116. Blood counts show stable mild anemia.      ROS:  ROS findings as noted in HPI.        Review of Systems   Constitutional:  Negative for fatigue and unexpected weight change.   Respiratory:  Negative for chest tightness and shortness of breath.    Cardiovascular:  Negative for chest pain, palpitations and leg swelling.   Gastrointestinal:  Negative for abdominal pain.   Musculoskeletal:  Negative for arthralgias.   Neurological:  Negative for dizziness, syncope, light-headedness and headaches.      Relevant History:  Eye Dr. Santoyo     Rheum Dr. Washington OA multiple sites     Derm Dr. Weil rosacea. C/o cyst right chest wall and was big, swollen, red and painful. Getting better. Refuses surgery referral     Urology Dr. Love BPH with luts sx.   Has frequency, incomplete emptying, dribbling and weak stream worsening over time. S/p urolift 2022-doing well since      Endo Type 2 dm with retinopathy. Eye care Dr. Stout.  A1c 6.0. lipids at goal on pravachol 40. On asa 81mg.  urine ma wnl-not on ACE I. Was on a 40 day cruise and ate very well     Podiatry Dr. DOREEN Son exam 2/22     Heme Folate/b12 nl    mild normocytic anemia     GI   [facility] colonoscopy 2017 on 10 year surveillance  Objective:      Physical Exam  Vitals and nursing note reviewed.   Constitutional:       Appearance: He is well-developed.   Cardiovascular:      Rate and Rhythm: Normal rate and regular rhythm.      Heart sounds: Normal heart sounds.   Pulmonary:      Effort: Pulmonary effort is normal.      Breath sounds: Normal breath sounds.   Skin:     General: Skin is warm and dry.   Neurological:      Mental Status: He is alert and oriented to person, place, and time.         Assessment:       ICD-10-CM ICD-9-CM    1. B12 deficiency  E53.8 266.2 cyanocobalamin, vitamin B-12, 1,000 mcg Lozg      CBC Auto Differential      Vitamin B12      2. Hand arthritis  M19.049 716.94 celecoxib (CELEBREX) 200 MG capsule      3. Hyperlipidemia associated with type 2 diabetes mellitus  E11.69 250.80 Comprehensive Metabolic Panel    E78.5 272.4 Lipid Panel      4. Type 2 diabetes mellitus without complication, without long-term current use of insulin  E11.9 250.00 Hemoglobin A1C         Plan:   1. B12 deficiency (Primary)  Treat and monitor  - cyanocobalamin, vitamin B-12, 1,000 mcg Lozg; Place 1,000 mcg under the tongue once daily.  - CBC Auto Differential; Future  - Vitamin B12; Future    2. Hand arthritis  treat  - celecoxib (CELEBREX) 200 MG capsule; Take 1 capsule (200 mg total) by mouth once daily.    3. Hyperlipidemia associated with type 2 diabetes mellitus  Controlled on current medications.  Continue current medications.    - Comprehensive Metabolic Panel; Future  - Lipid Panel; Future    4. Type 2 diabetes mellitus without complication, without long-term current use of insulin  Controlled on current medications.  Continue current medications.    - Hemoglobin A1C; Future    Assessment & Plan    - Explained importance of vitamin B12 for preventing anemia, nerve damage, and memory problems.  - Discussed A1c as a better indicator of overall glucose control compared to daily glucose  readings.  - Advised on potential side effects of Celebrex, including abdominal pain and GI bleeding.  - Mr. Tay to continue daily 3.5-mile walks.  - Started vitamin B12 supplement (sublingual form) daily.  - Started Celecoxib (generic for Celebrex) as prescribed by rheumatologist.  - Recommend using OTC Voltaren gel for hand arthritis.  - Continued Tylenol as needed for additional pain relief.  - B12 level ordered for 6 months from now.  - Comprehensive metabolic panel and complete blood count ordered for 6 months from now.  - Follow up in 6 months with labs before the appointment.  - Contact the office in mid-January to update insurance information and ensure correct primary care physician is listed.       Time spent with patient: 20 minutes  Patient with be reevaluated in 6 months or sooner prn  Greater than 50% of this visit was spent counseling as described in above documentation:Yes  This note was generated with the assistance of ambient listening technology. Verbal consent was obtained by the patient and accompanying visitor(s) for the recording of patient appointment to facilitate this note. I attest to having reviewed and edited the generated note for accuracy, though some syntax or spelling errors may persist. Please contact the author of this note for any clarification.

## 2025-01-08 ENCOUNTER — TELEPHONE (OUTPATIENT)
Dept: DERMATOLOGY | Facility: CLINIC | Age: 75
End: 2025-01-08
Payer: MEDICARE

## 2025-01-08 NOTE — TELEPHONE ENCOUNTER
----- Message from Nakita sent at 1/8/2025 10:52 AM CST -----  Regarding: Needs Medical Advice  Contact: patient at [phone]  Type: Needs Medical Advice  Who Called:  patient at [phone]    Additional Information: patient is calling to verify if Dr. Beatty accepts [employer] insurance which is the new insurance starting this January 2025. Please call and advise. Thank you  
Attempted to contact the pt with no answer.  LVM that pt should contact his insurance carrier or the [facility] billing department (phone number provided) to ask about the insurance.   
language

## 2025-01-18 ENCOUNTER — PATIENT MESSAGE (OUTPATIENT)
Dept: FAMILY MEDICINE | Facility: CLINIC | Age: 75
End: 2025-01-18
Payer: MEDICARE

## 2025-01-18 DIAGNOSIS — M19.049 HAND ARTHRITIS: ICD-10-CM

## 2025-01-18 DIAGNOSIS — E11.69 HYPERLIPIDEMIA ASSOCIATED WITH TYPE 2 DIABETES MELLITUS: ICD-10-CM

## 2025-01-18 DIAGNOSIS — E11.9 TYPE 2 DIABETES MELLITUS WITHOUT COMPLICATION, WITHOUT LONG-TERM CURRENT USE OF INSULIN: ICD-10-CM

## 2025-01-18 DIAGNOSIS — E78.5 HYPERLIPIDEMIA ASSOCIATED WITH TYPE 2 DIABETES MELLITUS: ICD-10-CM

## 2025-01-20 NOTE — TELEPHONE ENCOUNTER
No care due was identified.  [facility] Embedded Care Due Messages. Reference number: [id number].   1/20/2025 12:03:49 PM CST

## 2025-01-21 RX ORDER — METFORMIN HYDROCHLORIDE 500 MG/1
500 TABLET ORAL
Qty: 90 TABLET | Refills: 1 | Status: SHIPPED | OUTPATIENT
Start: 2025-01-21

## 2025-01-21 RX ORDER — PRAVASTATIN SODIUM 40 MG/1
40 TABLET ORAL NIGHTLY
Qty: 90 TABLET | Refills: 1 | Status: SHIPPED | OUTPATIENT
Start: 2025-01-21

## 2025-01-21 RX ORDER — CELECOXIB 200 MG/1
200 CAPSULE ORAL DAILY
Qty: 90 CAPSULE | Refills: 1 | Status: SHIPPED | OUTPATIENT
Start: 2025-01-21

## 2025-03-24 DIAGNOSIS — Z00.00 ENCOUNTER FOR MEDICARE ANNUAL WELLNESS EXAM: ICD-10-CM

## 2025-03-28 ENCOUNTER — TELEPHONE (OUTPATIENT)
Dept: DERMATOLOGY | Facility: CLINIC | Age: 75
End: 2025-03-28
Payer: MEDICARE

## 2025-03-28 NOTE — TELEPHONE ENCOUNTER
Called and got patient scheduled for an appointment.     ----- Message from Jacqui sent at 3/28/2025  3:02 PM CDT -----  Contact: self  Type:  Sooner Appointment RequestName of Caller: Pt When is the first available appointment? 8/19/2025Symptoms: Pt has hx of cancer / pt states he has a spot on his nose and face Would the patient rather a call back or a response via [facility]?  Call Best Call Back Number: [phone] call to advise... Thank you...

## 2025-05-13 ENCOUNTER — OFFICE VISIT (OUTPATIENT)
Dept: DERMATOLOGY | Facility: CLINIC | Age: 75
End: 2025-05-13
Payer: MEDICARE

## 2025-05-13 VITALS — BODY MASS INDEX: 23.16 KG/M2 | WEIGHT: 130.75 LBS

## 2025-05-13 DIAGNOSIS — Z08 ENCOUNTER FOR FOLLOW-UP SURVEILLANCE OF SKIN CANCER: ICD-10-CM

## 2025-05-13 DIAGNOSIS — Z85.828 ENCOUNTER FOR FOLLOW-UP SURVEILLANCE OF SKIN CANCER: ICD-10-CM

## 2025-05-13 DIAGNOSIS — L82.1 SEBORRHEIC KERATOSES: ICD-10-CM

## 2025-05-13 DIAGNOSIS — D22.9 MULTIPLE BENIGN NEVI: ICD-10-CM

## 2025-05-13 DIAGNOSIS — D18.01 CHERRY ANGIOMA: ICD-10-CM

## 2025-05-13 DIAGNOSIS — L81.4 SOLAR LENTIGO: ICD-10-CM

## 2025-05-13 DIAGNOSIS — L57.0 ACTINIC KERATOSES: ICD-10-CM

## 2025-05-13 DIAGNOSIS — D48.5 NEOPLASM OF UNCERTAIN BEHAVIOR OF SKIN: Primary | ICD-10-CM

## 2025-05-13 DIAGNOSIS — L57.8 ACTINIC SKIN DAMAGE: ICD-10-CM

## 2025-05-13 PROCEDURE — 1101F PT FALLS ASSESS-DOCD LE1/YR: CPT | Mod: CPTII,S$GLB,, | Performed by: DERMATOLOGY

## 2025-05-13 PROCEDURE — 88305 TISSUE EXAM BY PATHOLOGIST: CPT | Mod: TC | Performed by: DERMATOLOGY

## 2025-05-13 PROCEDURE — 11103 TANGNTL BX SKIN EA SEP/ADDL: CPT | Mod: S$GLB,,, | Performed by: DERMATOLOGY

## 2025-05-13 PROCEDURE — 99213 OFFICE O/P EST LOW 20 MIN: CPT | Mod: 25,S$GLB,, | Performed by: DERMATOLOGY

## 2025-05-13 PROCEDURE — 1159F MED LIST DOCD IN RCRD: CPT | Mod: CPTII,S$GLB,, | Performed by: DERMATOLOGY

## 2025-05-13 PROCEDURE — 1126F AMNT PAIN NOTED NONE PRSNT: CPT | Mod: CPTII,S$GLB,, | Performed by: DERMATOLOGY

## 2025-05-13 PROCEDURE — 3008F BODY MASS INDEX DOCD: CPT | Mod: CPTII,S$GLB,, | Performed by: DERMATOLOGY

## 2025-05-13 PROCEDURE — 17004 DESTROY PREMAL LESIONS 15/>: CPT | Mod: S$GLB,,, | Performed by: DERMATOLOGY

## 2025-05-13 PROCEDURE — 3288F FALL RISK ASSESSMENT DOCD: CPT | Mod: CPTII,S$GLB,, | Performed by: DERMATOLOGY

## 2025-05-13 PROCEDURE — 1160F RVW MEDS BY RX/DR IN RCRD: CPT | Mod: CPTII,S$GLB,, | Performed by: DERMATOLOGY

## 2025-05-13 PROCEDURE — 11102 TANGNTL BX SKIN SINGLE LES: CPT | Mod: XS,S$GLB,, | Performed by: DERMATOLOGY

## 2025-05-13 PROCEDURE — 88305 TISSUE EXAM BY PATHOLOGIST: CPT | Mod: 26,,, | Performed by: PATHOLOGY

## 2025-05-13 NOTE — PATIENT INSTRUCTIONS
Shave Biopsy Wound Care    Your doctor has performed a shave biopsy today.  A band aid and vaseline ointment has been placed over the site.  This should remain in place for 24 hours.  It is recommended that you keep the area dry for the first 24 hours.  After 24 hours, you may remove the band aid and wash the area with warm soap and water and apply Vaseline jelly.  Many patients prefer to use Neosporin or Bacitracin ointment.  This is acceptable; however, know that you can develop an allergy to this medication even if you have used it safely for years.  It is important to keep the area moist.  Letting it dry out and get air slows healing time, and will worsen the scar.  Band aid is optional after first 24 hours.      If you notice increasing redness, tenderness, pain, or yellow drainage at the biopsy site, please notify your doctor.  These are signs of an infection.    If your biopsy site is bleeding, apply firm pressure for 15 minutes straight.  Repeat for another 15 minutes, if it is still bleeding.   If the surgical site continues to bleed, then please contact your doctor.       [facility] - DERMATOLOGY  [address], [address]  [place] [address]  Dept: [phone]  Dept Fax: [phone]      CRYOSURGERY      Your doctor has used a method called cryosurgery to treat your skin condition. Cryosurgery refers to the use of very cold substances to treat a variety of skin conditions such as warts, pre-skin cancers, molluscum contagiosum, sun spots, and several benign growths. The substance we use in cryosurgery is liquid nitrogen and is so cold (-195 degrees Celsius) that is burns when administered.     Following treatment in the office, the skin may immediately burn and become red. You may find the area around the lesion is affected as well. It is sometimes necessary to treat not only the lesion, but a small area of the surrounding normal skin to achieve a good response.     A  blister, and even a blood filled blister, may form after treatment.   This is a normal response. If the blister is painful, it is acceptable to sterilize a needle and with rubbing alcohol and gently pop the blister. It is important that you gently wash the area with soap and warm water as the blister fluid may contain wart virus if a wart was treated. Do no remove the roof of the blister.     The area treated can take anywhere from 1-3 weeks to heal. Healing time depends on the kind skin lesion treated, the location, and how aggressively the lesion was treated. It is recommended that the areas treated are covered with Vaseline or bacitracin ointment and a band-aid. If a band-aid is not practical, just ointment applied several times per day will do. Keeping these areas moist will speed the healing time.    Treatment with liquid nitrogen can leave a scar. In dark skin, it may be a light or dark scar, in light skin it may be a white or pink scar. These will generally fade with time, but may never go away completely.     If you have any concerns after your treatment, please feel free to call the office.         [facility] - DERMATOLOGY  [address], [address]  [place] [address]  Dept: [phone]  Dept Fax: [phone]

## 2025-05-13 NOTE — PROGRESS NOTES
Subjective:      Patient ID:  Sukumar Tay is a 74 y.o. male who presents for   Chief Complaint   Patient presents with    Skin Check     UBSC     LOV 06/12/2022  Inflamed sebaceous cyst, AK, SK, lentigo, Hx NMSC, scar    Patient is coming in today for UBSC.  C/o spot on his nose  C/o spot on his back  C/o spots on his neck    Derm Hx:   BCC - Nose-6-2019  Has no fhx of MM      Current Outpatient Medications:   ·  aspirin (ECOTRIN) 81 MG EC tablet, Take 1 tablet (81 mg total) by mouth once daily., Disp: , Rfl:   ·  celecoxib (CELEBREX) 200 MG capsule, Take 1 capsule (200 mg total) by mouth once daily., Disp: 90 capsule, Rfl: 1  ·  cyanocobalamin, vitamin B-12, 1,000 mcg Lozg, Place 1,000 mcg under the tongue once daily., Disp: , Rfl:   ·  metFORMIN (GLUCOPHAGE) 500 MG tablet, Take 1 tablet (500 mg total) by mouth daily with breakfast., Disp: 90 tablet, Rfl: 1  ·  pravastatin (PRAVACHOL) 40 MG tablet, Take 1 tablet (40 mg total) by mouth every evening., Disp: 90 tablet, Rfl: 1           Review of Systems   Constitutional:  Negative for fever, chills and fatigue.   Respiratory:  Negative for cough and shortness of breath.    Gastrointestinal:  Negative for nausea, vomiting and diarrhea.   Skin:  Positive for wears hat. Negative for daily sunscreen use and activity-related sunscreen use.   Hematologic/Lymphatic: Bruises/bleeds easily (asa 81).       Objective:   Physical Exam   Constitutional: He appears well-developed and well-nourished. No distress.   Neurological: He is alert and oriented to person, place, and time. He is not disoriented.   Psychiatric: He has a normal mood and affect.   Skin:   Areas Examined (abnormalities noted in diagram):   Scalp / Hair Palpated and Inspected  Head / Face Inspection Performed  Neck Inspection Performed  Chest / Axilla Inspection Performed  Abdomen Inspection Performed  Back Inspection Performed  RUE Inspected  LUE Inspection Performed  Nails and Digits Inspection  Performed                     Diagram Legend     Erythematous scaling macule/papule c/w actinic keratosis       Vascular papule c/w angioma      Pigmented verrucoid papule/plaque c/w seborrheic keratosis      Yellow umbilicated papule c/w sebaceous hyperplasia      Irregularly shaped tan macule c/w lentigo     1-2 mm smooth white papules consistent with Milia      Movable subcutaneous cyst with punctum c/w epidermal inclusion cyst      Subcutaneous movable cyst c/w pilar cyst      Firm pink to brown papule c/w dermatofibroma      Pedunculated fleshy papule(s) c/w skin tag(s)      Evenly pigmented macule c/w junctional nevus     Mildly variegated pigmented, slightly irregular-bordered macule c/w mildly atypical nevus      Flesh colored to evenly pigmented papule c/w intradermal nevus       Pink pearly papule/plaque c/w basal cell carcinoma      Erythematous hyperkeratotic cursted plaque c/w SCC      Surgical scar with no sign of skin cancer recurrence      Open and closed comedones      Inflammatory papules and pustules      Verrucoid papule consistent consistent with wart     Erythematous eczematous patches and plaques     Dystrophic onycholytic nail with subungual debris c/w onychomycosis     Umbilicated papule    Erythematous-base heme-crusted tan verrucoid plaque consistent with inflamed seborrheic keratosis     Erythematous Silvery Scaling Plaque c/w Psoriasis     See annotation            Assessment / Plan:      Pathology Orders:       Normal Orders This Visit    Specimen to Pathology, Dermatology     Questions:    Procedure Type: Dermatology and skin neoplasms    Number of Specimens: 2    ------------------------: -------------------------    Spec 1 Procedure: Shave Biopsy    Spec 1 Clinical Impression: site of previous BCC, r/o residual lesion/recurrence    Spec 1 Source: R nasal tip    ------------------------: -------------------------    Spec 2 Procedure: Shave Biopsy    Spec 2 Clinical Impression: BCC vs  other    Spec 2 Source: L paramedian mid back    Clinical Information: see EPIC    Clinical History: see EPIC    Specimen Source: Skin    Release to patient: Immediate    Send normal result to authorizing provider's In Basket if patient is active on MyChart: Yes          Neoplasm of uncertain behavior of skin  -     Specimen to Pathology, Dermatology  Shave biopsy procedure note:x2    Shave biopsy performed after verbal consent including risk of infection, scar, recurrence, need for additional treatment of site. Area prepped with alcohol, anesthetized with approximately 1.0cc of 1% lidocaine with epinephrine. Lesional tissue shaved with razor blade. Hemostasis achieved with application of aluminum chloride followed by hyfrecation. No complications. Dressing applied. Wound care explained.    Actinic keratoses  Cryosurgery Procedure Note    Verbal consent from the patient is obtained and the patient is aware of the precancerous quality and need for treatment of these lesions. Liquid nitrogen cryosurgery is applied to the 32 actinic keratoses, as detailed in the physical exam, to produce a freeze injury. The patient is aware that blisters may form and is instructed on wound care with gentle cleansing and use of vaseline ointment to keep moist until healed. The patient is supplied a handout on cryosurgery and is instructed to call if lesions do not completely resolve.    PLAN FIELD TX WITH EFUDEX     Seborrheic keratoses  These are benign inherited growths without a malignant potential. Reassurance given to patient. No treatment is necessary.     Solar lentigo  This is a benign hyperpigmented sun induced lesion. Daily sun protection will reduce the number of new lesions. Treatment of these benign lesions are considered cosmetic.    Cherry angioma  This is a benign vascular lesion. Reassurance given. No treatment required.     Multiple benign nevi  Careful dermoscopy evaluation of nevi performed with none identified as  needing biopsy today  Monitor for new mole or moles that are becoming bigger, darker, irritated, or developing irregular borders.     Encounter for follow-up surveillance of skin cancer  Area of previous NMSC (NOSE) examined. Site well healed with signs of recurrence.  Upper body skin examination performed today including at least 9 points as noted in physical examination. 1 lesions suspicious for malignancy noted.    Actinic skin damage  Patient instructed in importance in daily broad spectrum sun protection of at least spf 30. Mineral sunscreen ingredients preferred (Zinc +/- Titanium) and can be found OTC.   Patient encouraged to wear hat for all outdoor exposure.   Also discussed sun avoidance and use of protective clothing.             Follow up in about 6 months (around 11/13/2025).

## 2025-05-15 ENCOUNTER — TELEPHONE (OUTPATIENT)
Dept: DERMATOLOGY | Facility: CLINIC | Age: 75
End: 2025-05-15
Payer: MEDICARE

## 2025-05-15 ENCOUNTER — RESULTS FOLLOW-UP (OUTPATIENT)
Dept: DERMATOLOGY | Facility: CLINIC | Age: 75
End: 2025-05-15
Payer: MEDICARE

## 2025-05-15 DIAGNOSIS — C44.311 BASAL CELL CARCINOMA (BCC) OF LEFT NASAL TIP: Primary | ICD-10-CM

## 2025-05-15 LAB
ESTROGEN SERPL-MCNC: NORMAL PG/ML
INSULIN SERPL-ACNC: NORMAL U[IU]/ML
LAB AP CLINICAL INFORMATION: NORMAL
LAB AP GROSS DESCRIPTION: NORMAL
LAB AP REPORT FOOTNOTES: NORMAL
T3RU NFR SERPL: NORMAL %

## 2025-05-15 NOTE — TELEPHONE ENCOUNTER
----- Message from Ana sent at 5/15/2025  1:42 PM CDT -----  Regarding: advice  Type:  Needs Medical AdviceWho Called: Troy Call Back Number: [phone] Information: pt wants a callback from a nurse.  please call to discuss.

## 2025-06-01 DIAGNOSIS — M19.049 HAND ARTHRITIS: ICD-10-CM

## 2025-06-01 DIAGNOSIS — E78.5 HYPERLIPIDEMIA ASSOCIATED WITH TYPE 2 DIABETES MELLITUS: ICD-10-CM

## 2025-06-01 DIAGNOSIS — E11.69 HYPERLIPIDEMIA ASSOCIATED WITH TYPE 2 DIABETES MELLITUS: ICD-10-CM

## 2025-06-01 DIAGNOSIS — E11.9 TYPE 2 DIABETES MELLITUS WITHOUT COMPLICATION, WITHOUT LONG-TERM CURRENT USE OF INSULIN: ICD-10-CM

## 2025-06-02 RX ORDER — METFORMIN HYDROCHLORIDE 500 MG/1
500 TABLET ORAL
Qty: 90 TABLET | Refills: 0 | Status: SHIPPED | OUTPATIENT
Start: 2025-06-02

## 2025-06-02 RX ORDER — CELECOXIB 200 MG/1
200 CAPSULE ORAL
Qty: 90 CAPSULE | Refills: 3 | Status: SHIPPED | OUTPATIENT
Start: 2025-06-02

## 2025-06-02 RX ORDER — PRAVASTATIN SODIUM 40 MG/1
40 TABLET ORAL NIGHTLY
Qty: 90 TABLET | Refills: 1 | Status: SHIPPED | OUTPATIENT
Start: 2025-06-02

## 2025-06-04 ENCOUNTER — LAB VISIT (OUTPATIENT)
Dept: LAB | Facility: HOSPITAL | Age: 75
End: 2025-06-04
Attending: FAMILY MEDICINE
Payer: MEDICARE

## 2025-06-04 ENCOUNTER — RESULTS FOLLOW-UP (OUTPATIENT)
Dept: FAMILY MEDICINE | Facility: CLINIC | Age: 75
End: 2025-06-04

## 2025-06-04 DIAGNOSIS — E78.5 HYPERLIPIDEMIA ASSOCIATED WITH TYPE 2 DIABETES MELLITUS: ICD-10-CM

## 2025-06-04 DIAGNOSIS — E53.8 B12 DEFICIENCY: ICD-10-CM

## 2025-06-04 DIAGNOSIS — E11.69 HYPERLIPIDEMIA ASSOCIATED WITH TYPE 2 DIABETES MELLITUS: ICD-10-CM

## 2025-06-04 DIAGNOSIS — E11.9 TYPE 2 DIABETES MELLITUS WITHOUT COMPLICATION, WITHOUT LONG-TERM CURRENT USE OF INSULIN: ICD-10-CM

## 2025-06-04 LAB
ABSOLUTE EOSINOPHIL (OHS): 0.39 K/UL
ABSOLUTE MONOCYTE (OHS): 0.48 K/UL (ref 0.3–1)
ABSOLUTE NEUTROPHIL COUNT (OHS): 3.31 K/UL (ref 1.8–7.7)
ALBUMIN SERPL BCP-MCNC: 4.4 G/DL (ref 3.5–5.2)
ALP SERPL-CCNC: 58 UNIT/L (ref 40–150)
ALT SERPL W/O P-5'-P-CCNC: 15 UNIT/L (ref 10–44)
ANION GAP (OHS): 7 MMOL/L (ref 8–16)
AST SERPL-CCNC: 25 UNIT/L (ref 11–45)
BASOPHILS # BLD AUTO: 0.04 K/UL
BASOPHILS NFR BLD AUTO: 0.7 %
BILIRUB SERPL-MCNC: 0.6 MG/DL (ref 0.1–1)
BUN SERPL-MCNC: 24 MG/DL (ref 8–23)
CALCIUM SERPL-MCNC: 9.2 MG/DL (ref 8.7–10.5)
CHLORIDE SERPL-SCNC: 106 MMOL/L (ref 95–110)
CHOLEST SERPL-MCNC: 136 MG/DL (ref 120–199)
CHOLEST/HDLC SERPL: 3.2 {RATIO} (ref 2–5)
CO2 SERPL-SCNC: 28 MMOL/L (ref 23–29)
CREAT SERPL-MCNC: 1.3 MG/DL (ref 0.5–1.4)
EAG (OHS): 120 MG/DL (ref 68–131)
ERYTHROCYTE [DISTWIDTH] IN BLOOD BY AUTOMATED COUNT: 12.5 % (ref 11.5–14.5)
GFR SERPLBLD CREATININE-BSD FMLA CKD-EPI: 58 ML/MIN/1.73/M2
GLUCOSE SERPL-MCNC: 113 MG/DL (ref 70–110)
HBA1C MFR BLD: 5.8 % (ref 4–5.6)
HCT VFR BLD AUTO: 43.1 % (ref 40–54)
HDLC SERPL-MCNC: 43 MG/DL (ref 40–75)
HDLC SERPL: 31.6 % (ref 20–50)
HGB BLD-MCNC: 14.1 GM/DL (ref 14–18)
IMM GRANULOCYTES # BLD AUTO: 0.01 K/UL (ref 0–0.04)
IMM GRANULOCYTES NFR BLD AUTO: 0.2 % (ref 0–0.5)
LDLC SERPL CALC-MCNC: 83 MG/DL (ref 63–159)
LYMPHOCYTES # BLD AUTO: 1.49 K/UL (ref 1–4.8)
MCH RBC QN AUTO: 31.1 PG (ref 27–31)
MCHC RBC AUTO-ENTMCNC: 32.7 G/DL (ref 32–36)
MCV RBC AUTO: 95 FL (ref 82–98)
NONHDLC SERPL-MCNC: 93 MG/DL
NUCLEATED RBC (/100WBC) (OHS): 0 /100 WBC
PLATELET # BLD AUTO: 196 K/UL (ref 150–450)
PMV BLD AUTO: 9.1 FL (ref 9.2–12.9)
POTASSIUM SERPL-SCNC: 4.8 MMOL/L (ref 3.5–5.1)
PROT SERPL-MCNC: 7.7 GM/DL (ref 6–8.4)
RBC # BLD AUTO: 4.53 M/UL (ref 4.6–6.2)
RELATIVE EOSINOPHIL (OHS): 6.8 %
RELATIVE LYMPHOCYTE (OHS): 26 % (ref 18–48)
RELATIVE MONOCYTE (OHS): 8.4 % (ref 4–15)
RELATIVE NEUTROPHIL (OHS): 57.9 % (ref 38–73)
SODIUM SERPL-SCNC: 141 MMOL/L (ref 136–145)
TRIGL SERPL-MCNC: 50 MG/DL (ref 30–150)
VIT B12 SERPL-MCNC: >2000 PG/ML (ref 210–950)
WBC # BLD AUTO: 5.72 K/UL (ref 3.9–12.7)

## 2025-06-04 PROCEDURE — 85025 COMPLETE CBC W/AUTO DIFF WBC: CPT

## 2025-06-04 PROCEDURE — 80061 LIPID PANEL: CPT

## 2025-06-04 PROCEDURE — 82607 VITAMIN B-12: CPT

## 2025-06-04 PROCEDURE — 36415 COLL VENOUS BLD VENIPUNCTURE: CPT | Mod: PO

## 2025-06-04 PROCEDURE — 82040 ASSAY OF SERUM ALBUMIN: CPT

## 2025-06-04 PROCEDURE — 83036 HEMOGLOBIN GLYCOSYLATED A1C: CPT

## 2025-06-11 ENCOUNTER — OFFICE VISIT (OUTPATIENT)
Dept: FAMILY MEDICINE | Facility: CLINIC | Age: 75
End: 2025-06-11
Payer: MEDICARE

## 2025-06-11 VITALS
HEART RATE: 63 BPM | DIASTOLIC BLOOD PRESSURE: 60 MMHG | SYSTOLIC BLOOD PRESSURE: 110 MMHG | TEMPERATURE: 98 F | HEIGHT: 63 IN | BODY MASS INDEX: 21.79 KG/M2 | WEIGHT: 123 LBS | RESPIRATION RATE: 18 BRPM | OXYGEN SATURATION: 98 %

## 2025-06-11 DIAGNOSIS — E11.69 HYPERLIPIDEMIA ASSOCIATED WITH TYPE 2 DIABETES MELLITUS: ICD-10-CM

## 2025-06-11 DIAGNOSIS — N13.8 BPH WITH OBSTRUCTION/LOWER URINARY TRACT SYMPTOMS: ICD-10-CM

## 2025-06-11 DIAGNOSIS — E11.9 TYPE 2 DIABETES MELLITUS WITHOUT COMPLICATION, WITHOUT LONG-TERM CURRENT USE OF INSULIN: Primary | ICD-10-CM

## 2025-06-11 DIAGNOSIS — E53.8 B12 DEFICIENCY: ICD-10-CM

## 2025-06-11 DIAGNOSIS — E78.5 HYPERLIPIDEMIA ASSOCIATED WITH TYPE 2 DIABETES MELLITUS: ICD-10-CM

## 2025-06-11 DIAGNOSIS — Z12.5 PROSTATE CANCER SCREENING: ICD-10-CM

## 2025-06-11 DIAGNOSIS — N40.1 BPH WITH OBSTRUCTION/LOWER URINARY TRACT SYMPTOMS: ICD-10-CM

## 2025-06-11 PROCEDURE — G2211 COMPLEX E/M VISIT ADD ON: HCPCS | Mod: S$GLB,,, | Performed by: FAMILY MEDICINE

## 2025-06-11 PROCEDURE — 3044F HG A1C LEVEL LT 7.0%: CPT | Mod: CPTII,S$GLB,, | Performed by: FAMILY MEDICINE

## 2025-06-11 PROCEDURE — 99214 OFFICE O/P EST MOD 30 MIN: CPT | Mod: S$GLB,,, | Performed by: FAMILY MEDICINE

## 2025-06-11 PROCEDURE — 1159F MED LIST DOCD IN RCRD: CPT | Mod: CPTII,S$GLB,, | Performed by: FAMILY MEDICINE

## 2025-06-11 PROCEDURE — 1126F AMNT PAIN NOTED NONE PRSNT: CPT | Mod: CPTII,S$GLB,, | Performed by: FAMILY MEDICINE

## 2025-06-11 PROCEDURE — 3008F BODY MASS INDEX DOCD: CPT | Mod: CPTII,S$GLB,, | Performed by: FAMILY MEDICINE

## 2025-06-11 PROCEDURE — 99999 PR PBB SHADOW E&M-EST. PATIENT-LVL III: CPT | Mod: PBBFAC,,, | Performed by: FAMILY MEDICINE

## 2025-06-11 PROCEDURE — 3288F FALL RISK ASSESSMENT DOCD: CPT | Mod: CPTII,S$GLB,, | Performed by: FAMILY MEDICINE

## 2025-06-11 PROCEDURE — 3078F DIAST BP <80 MM HG: CPT | Mod: CPTII,S$GLB,, | Performed by: FAMILY MEDICINE

## 2025-06-11 PROCEDURE — 3074F SYST BP LT 130 MM HG: CPT | Mod: CPTII,S$GLB,, | Performed by: FAMILY MEDICINE

## 2025-06-11 PROCEDURE — 1160F RVW MEDS BY RX/DR IN RCRD: CPT | Mod: CPTII,S$GLB,, | Performed by: FAMILY MEDICINE

## 2025-06-11 PROCEDURE — 1101F PT FALLS ASSESS-DOCD LE1/YR: CPT | Mod: CPTII,S$GLB,, | Performed by: FAMILY MEDICINE

## 2025-06-11 NOTE — PROGRESS NOTES
Subjective:       Patient ID: Sukumar Tay is a 74 y.o. male.    Chief Complaint: Follow-up (6mth f/u)    Problem List[1]  Patient is here for a chronic conditions follow up.    Reviewed labs 6/2025  History of Present Illness    CHIEF COMPLAINT:  Mr. Tay presents today for follow up.    LABS AND HYDRATION:  Fasting blood sugar was 113. B12 was over 2000 while taking B12 5000 mcg daily supplement. Cholesterol panel showed improvement with increased HDL. Comprehensive metabolic panel showed normal kidney and liver function with normal electrolytes. BUN was elevated, suggesting dehydration. He acknowledges inadequate water intake.    DIET:  He consumes multiple bananas daily and watermelon regularly, along with other fruits. He recently ate blueberry and cranberry cake.    ENT:  He reports constant bilateral ear itching.      ROS:  ROS findings as noted in HPI.        Review of Systems   Constitutional:  Negative for fatigue and unexpected weight change.   Respiratory:  Negative for chest tightness and shortness of breath.    Cardiovascular:  Negative for chest pain, palpitations and leg swelling.   Gastrointestinal:  Negative for abdominal pain.   Musculoskeletal:  Negative for arthralgias.   Neurological:  Negative for dizziness, syncope, light-headedness and headaches.      Relevant History:  Eye Dr. Santoyo      Rheum Dr. [place] OA multiple sites     Derm Dr. Weil rosacea. C/o cyst right chest wall and was big, swollen, red and painful. Getting better. Refuses surgery referral     Urology Dr. Love BPH with luts sx.   Has frequency, incomplete emptying, dribbling and weak stream worsening over time. S/p urolift 2022-doing well since      Endo Type 2 dm with retinopathy. Eye care Dr. Stout.  A1c 5.8. lipids at goal on pravachol 40. On asa 81mg.  urine ma wnl-not on ACE I.      Podiatry Dr. DOREEN Son      Heme Folate/b12 nl    mild normocytic anemia     GI  [facility] colonoscopy 2017 on 10 year  surveillance  Objective:      Physical Exam  Vitals and nursing note reviewed.   Constitutional:       Appearance: He is well-developed.   Cardiovascular:      Rate and Rhythm: Normal rate and regular rhythm.      Heart sounds: Normal heart sounds.   Pulmonary:      Effort: Pulmonary effort is normal.      Breath sounds: Normal breath sounds.   Skin:     General: Skin is warm and dry.   Neurological:      Mental Status: He is alert and oriented to person, place, and time.         Assessment:       ICD-10-CM ICD-9-CM    1. Type 2 diabetes mellitus without complication, without long-term current use of insulin  E11.9 250.00 CBC Auto Differential      Comprehensive Metabolic Panel      Hemoglobin A1C      Microalbumin/Creatinine Ratio, Urine      2. Hyperlipidemia associated with type 2 diabetes mellitus  E11.69 250.80 Lipid Panel    E78.5 272.4       3. B12 deficiency  E53.8 266.2 Vitamin B12      4. BPH with obstruction/lower urinary tract symptoms  N40.1 600.01     N13.8 599.69       5. Prostate cancer screening  Z12.5 V76.44 PSA, Screening         Plan:   1. Type 2 diabetes mellitus without complication, without long-term current use of insulin (Primary)  Controlled on current medications.  Continue current medications.    - CBC Auto Differential; Future  - Comprehensive Metabolic Panel; Future  - Hemoglobin A1C; Future  - Microalbumin/Creatinine Ratio, Urine; Future    2. Hyperlipidemia associated with type 2 diabetes mellitus  Controlled on current medications.  Continue current medications.    - Lipid Panel; Future    3. B12 deficiency  Hold b12 supplements for 3 days prior to next lab draw.   - Vitamin B12; Future    4. BPH with obstruction/lower urinary tract symptoms  Monitor and treat if sx worsen    5. Prostate cancer screening  Discussed benefits, risks and limitations of PSA testing and current USPSTF guidelines.  Patient expressed verbal understanding and wished to pursue screening.    - PSA, Screening;  Future  Assessment & Plan    - Clarified misconceptions about fruit consumption for diabetic control, emphasizing benefits of whole fruits despite sugar content.  - Educated on proper plate portioning for balanced meals: half vegetables, quarter protein, quarter carbohydrates.  - Advised on carbohydrate budgeting, recommending 1 carb choice per meal with 6-hour intervals.  - Explained the difference between tight glucose control for insulin-dependent diabetics vs. management for pre-diabetics.  - Mr. Tay to limit carbohydrate intake to 1 source per meal, waiting 6 hours between carb servings.  - Recommend reducing consumption of simple carbohydrates and sweets.  - Mr. Tay to continue fruit consumption without restriction.  - Explained water-soluble nature of B12 and its impact on blood levels when supplementing.  - Continued B12 supplement at 5000 mcg daily, hold for 2-3 days prior to next lab draw.  - Ordered B12 level to be rechecked in 6 months.  - Mr. Tay can apply 1-2 drops of olive oil or tea tree oil in ears as needed for itching.  - Mr. Tay to increase water intake to 64 oz per day.  - Follow up in 6 months for lab work and reassessment.         Time spent with patient: 20 minutes  Patient with be reevaluated in 6 months or sooner prn  Greater than 50% of this visit was spent counseling as described in above documentation:Yes  This note was generated with the assistance of ambient listening technology. Verbal consent was obtained by the patient and accompanying visitor(s) for the recording of patient appointment to facilitate this note. I attest to having reviewed and edited the generated note for accuracy, though some syntax or spelling errors may persist. Please contact the author of this note for any clarification.            [1]   Patient Active Problem List  Diagnosis    Type 2 diabetes mellitus without complication, without long-term current use of insulin    Gout    Hyperlipidemia associated with  type 2 diabetes mellitus    Macrocytic anemia    BPH with obstruction/lower urinary tract symptoms

## 2025-07-07 ENCOUNTER — PROCEDURE VISIT (OUTPATIENT)
Dept: DERMATOLOGY | Facility: CLINIC | Age: 75
End: 2025-07-07
Payer: MEDICARE

## 2025-07-07 VITALS — WEIGHT: 123 LBS | BODY MASS INDEX: 21.79 KG/M2

## 2025-07-07 DIAGNOSIS — C44.91 SUPERFICIAL BASAL CELL CARCINOMA: Primary | ICD-10-CM

## 2025-07-07 PROCEDURE — 99499 UNLISTED E&M SERVICE: CPT | Mod: S$GLB,,, | Performed by: DERMATOLOGY

## 2025-07-07 PROCEDURE — 17262 DSTRJ MAL LES T/A/L 1.1-2.0: CPT | Mod: S$GLB,,, | Performed by: DERMATOLOGY

## 2025-07-07 NOTE — PATIENT INSTRUCTIONS
ED&C Wound Care    Your doctor has performed an electrodesiccation and curettage today.  A bandage and vaseline ointment has been placed over the site.  This should remain in place for 24 hours.  It is recommended that you keep the area dry for the first 24 hours.  After 24 hours, you may remove the band aid and wash the area with warm soap and water and apply Vaseline jelly or aquaphore.  Many patients prefer to use Neosporin or Bacitracin ointment.  This is acceptable; however know that you can develop an allergy to this medication even if you have used it safely for years.  It is important to keep the area moist.  Letting it dry out and get air slows healing time, and will worsen the scar.        If you notice increasing redness, tenderness, pain, or yellow drainage at the biopsy or surgical site, please notify your doctor.  These are signs of an infection.    If your biopsy/surgical site is bleeding, apply firm pressure for 15 minutes straight.  Repeat for another 15 minutes, if it is still bleeding.   If the surgical site continues to bleed, then please contact your doctor.     [facility]   [facility]   [address] [place] [address]   Dept: [phone]

## 2025-07-07 NOTE — PROGRESS NOTES
Subjective:      Patient ID:  Sukumar Tay is a 75 y.o. male who presents for   Chief Complaint   Patient presents with    Basal Cell Carcinoma     LOV 5/13/25 NUB, [place], SK    1. Skin, right nasal tip, shave biopsy: PLANNED MOHS  -RESIDUAL/RECURRENT BASAL CELL CARCINOMA, EXTENDING TO THE PERIPHERAL AND DEEP BIOPSY EDGES     2. Skin, left paramedian mid back, shave biopsy:  -BASAL CELL CARCINOMA, SUPERFICIAL TYPE, EXTENDING TO A PERIPHERAL BIOPSY EDGE    Patient here for ED&C of left paramedian mid back.    Derm Hx:   BCC- right nasal tip 5/2025  BCC- left paramedian mid back 5/2025  BCC - Nose-6-2019  Has no fhx of MM    Current Outpatient Medications:   ·  celecoxib (CELEBREX) 200 MG capsule, TAKE 1 CAPSULE BY MOUTH ONCE  DAILY, Disp: 90 capsule, Rfl: 3  ·  cyanocobalamin, vitamin B-12, 1,000 mcg Lozg, Place 1,000 mcg under the tongue once daily., Disp: , Rfl:   ·  metFORMIN (GLUCOPHAGE) 500 MG tablet, TAKE 1 TABLET BY MOUTH DAILY  WITH BREAKFAST, Disp: 90 tablet, Rfl: 0  ·  pravastatin (PRAVACHOL) 40 MG tablet, TAKE 1 TABLET BY MOUTH IN THE  EVENING, Disp: 90 tablet, Rfl: 1  ·  aspirin (ECOTRIN) 81 MG EC tablet, Take 1 tablet (81 mg total) by mouth once daily., Disp: , Rfl:                Review of Systems   Constitutional:  Negative for fever, chills and fatigue.   Respiratory:  Negative for cough and shortness of breath.    Gastrointestinal:  Negative for nausea, vomiting and diarrhea.   Skin:  Positive for wears hat. Negative for daily sunscreen use and activity-related sunscreen use.   Hematologic/Lymphatic: Bruises/bleeds easily (asa 81).       Objective:   Physical Exam   Skin:               Diagram Legend     Erythematous scaling macule/papule c/w actinic keratosis       Vascular papule c/w angioma      Pigmented verrucoid papule/plaque c/w seborrheic keratosis      Yellow umbilicated papule c/w sebaceous hyperplasia      Irregularly shaped tan macule c/w lentigo     1-2 mm smooth white papules  consistent with Milia      Movable subcutaneous cyst with punctum c/w epidermal inclusion cyst      Subcutaneous movable cyst c/w pilar cyst      Firm pink to brown papule c/w dermatofibroma      Pedunculated fleshy papule(s) c/w skin tag(s)      Evenly pigmented macule c/w junctional nevus     Mildly variegated pigmented, slightly irregular-bordered macule c/w mildly atypical nevus      Flesh colored to evenly pigmented papule c/w intradermal nevus       Pink pearly papule/plaque c/w basal cell carcinoma      Erythematous hyperkeratotic cursted plaque c/w SCC      Surgical scar with no sign of skin cancer recurrence      Open and closed comedones      Inflammatory papules and pustules      Verrucoid papule consistent consistent with wart     Erythematous eczematous patches and plaques     Dystrophic onycholytic nail with subungual debris c/w onychomycosis     Umbilicated papule    Erythematous-base heme-crusted tan verrucoid plaque consistent with inflamed seborrheic keratosis     Erythematous Silvery Scaling Plaque c/w Psoriasis     See annotation      Assessment / Plan:        Superficial basal cell carcinoma    Electrodessication and Curettage Procedure note:    Verbal consent obtained.Time out period with surgeon, assistant and patient in surgical suite was taken. Lesional tissue marked and prepped with alcohol. Lesion anesthetized with 1% lidocaine with epinephrine. Curettage and Desiccation x 3 cycles to base. Aluminum chloride for hemostasis. Lesion size after primary curettage: 1.4 cm    Area bandaged and wound care explained.          Follow up in about 6 months (around 1/7/2026), or if symptoms worsen or fail to improve.

## 2025-07-14 ENCOUNTER — PROCEDURE VISIT (OUTPATIENT)
Dept: DERMATOLOGY | Facility: CLINIC | Age: 75
End: 2025-07-14
Payer: MEDICARE

## 2025-07-14 VITALS
HEART RATE: 72 BPM | HEIGHT: 63 IN | DIASTOLIC BLOOD PRESSURE: 65 MMHG | WEIGHT: 120 LBS | SYSTOLIC BLOOD PRESSURE: 136 MMHG | BODY MASS INDEX: 21.26 KG/M2

## 2025-07-14 DIAGNOSIS — C44.311 BASAL CELL CARCINOMA (BCC) OF LEFT NASAL TIP: ICD-10-CM

## 2025-07-14 PROCEDURE — 17311 MOHS 1 STAGE H/N/HF/G: CPT | Mod: 51,79,S$GLB, | Performed by: DERMATOLOGY

## 2025-07-14 PROCEDURE — 17312 MOHS ADDL STAGE: CPT | Mod: S$GLB,,, | Performed by: DERMATOLOGY

## 2025-07-14 PROCEDURE — 14060 TIS TRNFR E/N/E/L 10 SQ CM/<: CPT | Mod: 79,S$GLB,, | Performed by: DERMATOLOGY

## 2025-07-14 RX ORDER — DOXYCYCLINE 100 MG/1
100 CAPSULE ORAL 2 TIMES DAILY
Qty: 14 CAPSULE | Refills: 0 | Status: SHIPPED | OUTPATIENT
Start: 2025-07-14 | End: 2025-07-21

## 2025-07-14 NOTE — PROGRESS NOTES
MOHS MICROGRAPHIC SURGERY OPERATIVE NOTE  Name:  Sukumar Tay  Date: 2025  Patient : 1950  Attending Surgeon: Amol Sharp MD  Assistants: Maddie Silva - Surgical Technician  Anesthetic Agent: 1% lidocaine with 1:100,000 epinephrine  Clinical Diagnosis: basal cell carcinoma -recurrent  Operation: Mohs Micrographic Surgery  Location: right nasal tip  Indications: Location in mask areas of face including central face, nose, eyelids, eyebrows, lips, chin, preauricular, temple, and ear.  Surgical Preparation: povidone-iodine  Pre-op anbitioics: no  MOHS Case #: [facility]      Description of Operation:  The nature and purpose of the procedure, associated risks and alternative treatments were explained to the patient in detail. All patient questions were answered completely.  An informed operative consent and photography permit were obtained. The tumor location was then identified and marked with agreement by the patient of the correct location.   Time out was performed with all present parties agreeing to two patient identifiers and the procedure location.  The patient was positioned, prepped, and draped in the usual sterile manner.  Local anesthesia was obtained with 1 cc(s) of 1% lidocaine with 1:100,000 epinephrine. The lesion pre-operatively measures 0.7 by 0.6 cm.     1ST STAGE:  A 2+ mm rim of normal appearing skin was marked circumferentially around the lesion after scraping with a curette to define the margin.   The area thus outlined was excised at a 45 degree angle. Hemostasis was obtained with electrodesiccation.   The specimen was oriented, mapped, and subdivided into at least two sections for evaluation.  Sections were then chromacoded and submitted for horizontal frozen sections.   The patient tolerated the procedure well and there were no complications.   Upon microscopic examination of the processed horizontal frozen sections of this stage:  Tumor was present at the margin of the  specimen; these areas of residual tumor were marked on the reference map with red pencil, pinpointing the location in which further tissue excision was necessary.       Tumor type noted on stage 1: Nodular basal cell carcinoma: Nodular tumor in dermis composed of basaloid cells exhibiting peripheral palisading and retraction artifact.     SUBSEQUENT STAGES:    The patient returned to the procedure room for additional Mohs stages and was prepped and draped in the sterile manner previously described.   The patient verbally confirmed their name and date of birth, which were cross-verified with the Mohs surgical map, procedure location, and the patient's wristband.   A surgical time-out was repeated, and all present parties confirmed the correct patient, surgical site, and Mohs map prior to proceeding.  Residual tumor was noted on prior histologic sections, and an additional 1-2 mm margin of tissue was excised from the corresponding areas as indicated on the map.   A new reference map was created to maintain precise anatomic orientation.  Hemostasis was achieved, and the tissue was properly labeled and processed for frozen section analysis.   The sections were examined by the Mohs surgeon, and areas of persistent tumor were marked on the reference map.  This process was repeated as needed. Final frozen sections from STAGE 2 demonstrated no residual tumor, and complete tumor extirpation was confirmed.    Tumor type identified in subsequent stages: No tumor seen.       SUMMARY:  The tumor was extirpated in 2 Mohs Stages resulting in a final defect measuring 1.0 by 0.8 cm².   The final defect extended deep to subcutaneous fat  The patient tolerated the procedure well and no complications were noted.     DEFECT PHOTO:      Amol Sharp MD      FLAP CLOSURE OF MOHS DEFECT OPERATIVE REPORT  Patient Name: Sukumar Tay  Patient YOB: 1950  Date of procedure: 7/14/2025  Attending Surgeon: Amol  MD Lila FAAD  Anesthetic Agent: 1% lidocaine with 1:100,000 epinephrine   Assistant: Maddie Silva - Surgical Technician  Clinical Diagnosis: A 1.0 x 0.8 cm² defect after Mohs Micrographic Surgery  Location: right nasal tip  Operation:  East-West advancement flap   Surgical Preparation: povidone-iodine    Description of Operation:  The nature and purpose of the procedure, associated risks and alternative treatments were explained to the patient in detail. All patient questions were answered completely. In order to minimize tension on the closure and avoid compromising the anatomic contour of the anatomic region and maximize functional capacity, the above noted adjacent tissue transfer was performed.    An informed operative consent and photography permit were obtained. The patient was positioned, prepped, and draped in the usual sterile manner. Local anesthesia was obtained with 4 cc(s) of 1% lidocaine with 1:100,000 epinephrine The defect edges were debeveled with a #15 blade. Using gentian violet, the flap was designed adjacent to the defect including all anticipated dog ears. The area thus outlined was incised deep to SMAS with the scalpel. This resulted in a final undermined and elevated flap defect measuring 3.7 x 1.8 cm².   The flap and skin margins were then undermined 50 to 75% of the defects diameter in all directions utilizing supercut iris scissors. Hemostasis was achieved with electrodessication.   The secondary defect was closed first utilizing 5-0 Monocryl interrupted buried subcutaneous vertical mattress sutures.     The adjacent tissue flap was then mobilized into place and anchored with additional 5-0 Monocryl interrupted buried subcutaneous vertical mattress sutures. The flap and recipient sites were sculpted in the adipose and dermal planes for a good fit. The skin edges were then reapposed with 6-0 Prolene. Redundant tissue was noted at the inferior and superior aspect of the adjacent  tissue transfer. Burows triangles were removed utilizing a #15 blade and the epidermal edges reapposed with 6-0 Prolene. This repair resulted in excellent contour with normal symmetry. The patient tolerated the procedure well and there were no complications.   Polysporin, non-adherent gauze and a pressure dressing were applied to wound after gentle cleansing with saline.    Post op meds: Doxycycline 100 BID x 7 days     Photos:      MD SALLY Coleman

## 2025-07-21 ENCOUNTER — CLINICAL SUPPORT (OUTPATIENT)
Dept: DERMATOLOGY | Facility: CLINIC | Age: 75
End: 2025-07-21
Payer: MEDICARE

## 2025-07-21 DIAGNOSIS — Z48.02 VISIT FOR SUTURE REMOVAL: Primary | ICD-10-CM

## 2025-07-21 NOTE — PROGRESS NOTES
Suture Removal Note   Patient Name: Sukumar Tay  Patient : 1950  Date of Service: 2025    CC: Pt. Presents for removal of sutures on the nose today  Subjective: patient reports wound healing as expected     Objective: Wound well healed, sutures clean/dry/intact. No evidence of any complications noted.   Photo:     Visit For Suture Removal:  - Suture removal today  - Steri strips/mastisol were not applied  - Patient counseled on silicone gel/silicone sheeting and their use in the management of post-operative scars  - Handout on silicone gel/silicone gel sheeting was provided  - Patient to follow up with their referring provider in 3 months for further skin evaluation    Maddie Silva

## 2025-08-25 DIAGNOSIS — E11.9 TYPE 2 DIABETES MELLITUS WITHOUT COMPLICATION, WITHOUT LONG-TERM CURRENT USE OF INSULIN: ICD-10-CM

## 2025-08-26 RX ORDER — METFORMIN HYDROCHLORIDE 500 MG/1
500 TABLET ORAL
Qty: 90 TABLET | Refills: 1 | Status: SHIPPED | OUTPATIENT
Start: 2025-08-26

## (undated) DEVICE — GOWN POLY REINF BRTH SLV XL

## (undated) DEVICE — GLOVE SURG ULTRA TOUCH 7

## (undated) DEVICE — SLEEVE SCD EXPRESS KNEE MEDIUM

## (undated) DEVICE — SOL 9P NACL IRR PIC IL

## (undated) DEVICE — SYR LUER LOCK STERILE 10ML

## (undated) DEVICE — SPONGE BULKEE II ABSRB 6X6.75

## (undated) DEVICE — GUIDE BIOPSY BIPLANAR 18G

## (undated) DEVICE — LEGGING CLEAR POLY 2/PACK

## (undated) DEVICE — SPONGE GAUZE 16PLY 4X4

## (undated) DEVICE — BAG LINGEMAN DRAIN UROLOGY

## (undated) DEVICE — SCRUB 10% POVIDONE IODINE 4OZ

## (undated) DEVICE — SOL IRR NACL .9% 3000ML

## (undated) DEVICE — WATER STERILE INJ 500ML BAG

## (undated) DEVICE — BOWL STERILE LARGE 32OZ

## (undated) DEVICE — BAG URINARY DRN 2000ML

## (undated) DEVICE — DRAPE UINDERBUT GRAD PCH

## (undated) DEVICE — SYR 50ML CATH TIP

## (undated) DEVICE — DRAPE T CYSTOSCOPY STERILE

## (undated) DEVICE — ELECTRODE REM PLYHSV RETURN 9

## (undated) DEVICE — CATH COUDE 18F 5CC W/STAT LOC

## (undated) DEVICE — LUBRICANT SURGILUBE 2 OZ

## (undated) DEVICE — SET IRR URLGY 2LINE UNIV SPIKE

## (undated) DEVICE — SOL IRR WATER STRL 3000 ML

## (undated) DEVICE — SCRUB DYNA-HEX LIQ 4% CHG 4OZ

## (undated) DEVICE — DEVICE ANC SW STAT FOLEY 6-24

## (undated) DEVICE — GOWN POLY REINF BRTH SLV LG

## (undated) DEVICE — Device

## (undated) DEVICE — SET CYSTO IRRIGATION UNIV SPIK

## (undated) DEVICE — COVER TRANSDUCER LATEX N/STERI

## (undated) DEVICE — JELLY SURGILUBE LUBE TUBE 2OZ

## (undated) DEVICE — COVER TABLE 44X90 STERILE